# Patient Record
Sex: MALE | Race: WHITE | NOT HISPANIC OR LATINO
[De-identification: names, ages, dates, MRNs, and addresses within clinical notes are randomized per-mention and may not be internally consistent; named-entity substitution may affect disease eponyms.]

---

## 2013-12-19 RX ORDER — LISINOPRIL 2.5 MG/1
10.5 TABLET ORAL
Qty: 0 | Refills: 0 | DISCHARGE
Start: 2013-12-19

## 2017-01-04 ENCOUNTER — RESULT REVIEW (OUTPATIENT)
Age: 62
End: 2017-01-04

## 2017-01-04 ENCOUNTER — OUTPATIENT (OUTPATIENT)
Dept: OUTPATIENT SERVICES | Facility: HOSPITAL | Age: 62
LOS: 1 days | Discharge: HOME | End: 2017-01-04

## 2017-01-04 DIAGNOSIS — D72.819 DECREASED WHITE BLOOD CELL COUNT, UNSPECIFIED: ICD-10-CM

## 2017-01-04 DIAGNOSIS — Z98.89 OTHER SPECIFIED POSTPROCEDURAL STATES: Chronic | ICD-10-CM

## 2017-01-05 ENCOUNTER — APPOINTMENT (OUTPATIENT)
Dept: HEMATOLOGY ONCOLOGY | Facility: CLINIC | Age: 62
End: 2017-01-05

## 2017-01-05 VITALS
OXYGEN SATURATION: 97 % | WEIGHT: 151.9 LBS | SYSTOLIC BLOOD PRESSURE: 120 MMHG | RESPIRATION RATE: 16 BRPM | HEART RATE: 67 BPM | BODY MASS INDEX: 24.71 KG/M2 | DIASTOLIC BLOOD PRESSURE: 77 MMHG | HEIGHT: 65.94 IN | TEMPERATURE: 98 F

## 2017-01-05 LAB
HCT VFR BLD CALC: 44.7 % — SIGNIFICANT CHANGE UP (ref 39–50)
HGB BLD-MCNC: 15.2 G/DL — SIGNIFICANT CHANGE UP (ref 13–17)
MCHC RBC-ENTMCNC: 30.7 PG — SIGNIFICANT CHANGE UP (ref 27–34)
MCHC RBC-ENTMCNC: 34.1 GM/DL — SIGNIFICANT CHANGE UP (ref 32–36)
MCV RBC AUTO: 90.2 FL — SIGNIFICANT CHANGE UP (ref 80–100)
PLATELET # BLD AUTO: 115 K/UL — LOW (ref 150–400)
RBC # BLD: 4.95 M/UL — SIGNIFICANT CHANGE UP (ref 4.2–5.8)
RBC # FLD: 12.4 % — SIGNIFICANT CHANGE UP (ref 10.3–14.5)
RETICS #: 63.9 K/UL — SIGNIFICANT CHANGE UP (ref 25–125)
RETICS/RBC NFR: 1.2 %/6 HR — SIGNIFICANT CHANGE UP (ref 0.5–2.5)
WBC # BLD: 6.5 K/UL — SIGNIFICANT CHANGE UP (ref 3.8–10.5)
WBC # FLD AUTO: 6.5 K/UL — SIGNIFICANT CHANGE UP (ref 3.8–10.5)

## 2017-01-06 DIAGNOSIS — D69.6 THROMBOCYTOPENIA, UNSPECIFIED: ICD-10-CM

## 2017-01-06 LAB
BASOPHILS # BLD AUTO: 0 K/UL — SIGNIFICANT CHANGE UP (ref 0–0.2)
BASOPHILS NFR BLD AUTO: 0.6 % — SIGNIFICANT CHANGE UP (ref 0–2)
EOSINOPHIL # BLD AUTO: 0.1 K/UL — SIGNIFICANT CHANGE UP (ref 0–0.5)
EOSINOPHIL NFR BLD AUTO: 1.6 % — SIGNIFICANT CHANGE UP (ref 0–6)
HAPTOGLOB SERPL-MCNC: 72 MG/DL — SIGNIFICANT CHANGE UP (ref 34–200)
LYMPHOCYTES # BLD AUTO: 1.2 K/UL — SIGNIFICANT CHANGE UP (ref 1–3.3)
LYMPHOCYTES # BLD AUTO: 18.3 % — SIGNIFICANT CHANGE UP (ref 13–44)
MONOCYTES # BLD AUTO: 0.6 K/UL — SIGNIFICANT CHANGE UP (ref 0–0.9)
MONOCYTES NFR BLD AUTO: 8.8 % — SIGNIFICANT CHANGE UP (ref 2–14)
NEUTROPHILS # BLD AUTO: 4.6 K/UL — SIGNIFICANT CHANGE UP (ref 1.8–7.4)
NEUTROPHILS NFR BLD AUTO: 70.6 % — SIGNIFICANT CHANGE UP (ref 43–77)

## 2017-03-29 ENCOUNTER — APPOINTMENT (OUTPATIENT)
Dept: ELECTROPHYSIOLOGY | Facility: CLINIC | Age: 62
End: 2017-03-29

## 2017-03-29 ENCOUNTER — APPOINTMENT (OUTPATIENT)
Dept: CARDIOLOGY | Facility: HOSPITAL | Age: 62
End: 2017-03-29

## 2017-03-29 ENCOUNTER — NON-APPOINTMENT (OUTPATIENT)
Age: 62
End: 2017-03-29

## 2017-03-29 ENCOUNTER — OUTPATIENT (OUTPATIENT)
Dept: OUTPATIENT SERVICES | Facility: HOSPITAL | Age: 62
LOS: 1 days | End: 2017-03-29
Payer: MEDICAID

## 2017-03-29 VITALS
OXYGEN SATURATION: 97 % | HEIGHT: 65 IN | BODY MASS INDEX: 25.33 KG/M2 | SYSTOLIC BLOOD PRESSURE: 129 MMHG | HEART RATE: 74 BPM | DIASTOLIC BLOOD PRESSURE: 83 MMHG | WEIGHT: 152 LBS

## 2017-03-29 DIAGNOSIS — I25.10 ATHEROSCLEROTIC HEART DISEASE OF NATIVE CORONARY ARTERY WITHOUT ANGINA PECTORIS: ICD-10-CM

## 2017-03-29 DIAGNOSIS — Z98.89 OTHER SPECIFIED POSTPROCEDURAL STATES: Chronic | ICD-10-CM

## 2017-03-29 PROCEDURE — G0463: CPT

## 2017-03-30 DIAGNOSIS — I25.5 ISCHEMIC CARDIOMYOPATHY: ICD-10-CM

## 2017-04-04 ENCOUNTER — OUTPATIENT (OUTPATIENT)
Dept: OUTPATIENT SERVICES | Facility: HOSPITAL | Age: 62
LOS: 1 days | Discharge: HOME | End: 2017-04-04

## 2017-04-04 DIAGNOSIS — Z98.89 OTHER SPECIFIED POSTPROCEDURAL STATES: Chronic | ICD-10-CM

## 2017-04-04 DIAGNOSIS — D72.819 DECREASED WHITE BLOOD CELL COUNT, UNSPECIFIED: ICD-10-CM

## 2017-04-05 ENCOUNTER — RESULT REVIEW (OUTPATIENT)
Age: 62
End: 2017-04-05

## 2017-04-06 ENCOUNTER — APPOINTMENT (OUTPATIENT)
Dept: HEMATOLOGY ONCOLOGY | Facility: CLINIC | Age: 62
End: 2017-04-06

## 2017-04-06 VITALS
SYSTOLIC BLOOD PRESSURE: 128 MMHG | OXYGEN SATURATION: 98 % | HEART RATE: 77 BPM | WEIGHT: 151.02 LBS | RESPIRATION RATE: 16 BRPM | BODY MASS INDEX: 25.13 KG/M2 | DIASTOLIC BLOOD PRESSURE: 73 MMHG | TEMPERATURE: 98.4 F

## 2017-04-06 LAB
ALBUMIN SERPL ELPH-MCNC: 4.1 G/DL — SIGNIFICANT CHANGE UP (ref 3.3–5)
ALP SERPL-CCNC: 65 U/L — SIGNIFICANT CHANGE UP (ref 40–120)
ALT FLD-CCNC: 29 U/L — SIGNIFICANT CHANGE UP (ref 10–45)
ANION GAP SERPL CALC-SCNC: 13 MMOL/L — SIGNIFICANT CHANGE UP (ref 5–17)
AST SERPL-CCNC: 17 U/L — SIGNIFICANT CHANGE UP (ref 10–40)
BILIRUB SERPL-MCNC: 0.9 MG/DL — SIGNIFICANT CHANGE UP (ref 0.2–1.2)
BUN SERPL-MCNC: 25 MG/DL — HIGH (ref 7–23)
CALCIUM SERPL-MCNC: 8.8 MG/DL — SIGNIFICANT CHANGE UP (ref 8.4–10.5)
CHLORIDE SERPL-SCNC: 105 MMOL/L — SIGNIFICANT CHANGE UP (ref 96–108)
CO2 SERPL-SCNC: 23 MMOL/L — SIGNIFICANT CHANGE UP (ref 22–31)
CREAT SERPL-MCNC: 0.98 MG/DL — SIGNIFICANT CHANGE UP (ref 0.5–1.3)
GLUCOSE SERPL-MCNC: 87 MG/DL — SIGNIFICANT CHANGE UP (ref 70–99)
HCT VFR BLD CALC: 45.7 % — SIGNIFICANT CHANGE UP (ref 39–50)
HGB BLD-MCNC: 15.5 G/DL — SIGNIFICANT CHANGE UP (ref 13–17)
MCHC RBC-ENTMCNC: 30.7 PG — SIGNIFICANT CHANGE UP (ref 27–34)
MCHC RBC-ENTMCNC: 34 G/DL — SIGNIFICANT CHANGE UP (ref 32–36)
MCV RBC AUTO: 90.2 FL — SIGNIFICANT CHANGE UP (ref 80–100)
PLATELET # BLD AUTO: 115 K/UL — LOW (ref 150–400)
POTASSIUM SERPL-MCNC: 4.1 MMOL/L — SIGNIFICANT CHANGE UP (ref 3.5–5.3)
POTASSIUM SERPL-SCNC: 4.1 MMOL/L — SIGNIFICANT CHANGE UP (ref 3.5–5.3)
PROT SERPL-MCNC: 6.6 G/DL — SIGNIFICANT CHANGE UP (ref 6–8.3)
RBC # BLD: 5.06 M/UL — SIGNIFICANT CHANGE UP (ref 4.2–5.8)
RBC # FLD: 12.5 % — SIGNIFICANT CHANGE UP (ref 10.3–14.5)
SODIUM SERPL-SCNC: 141 MMOL/L — SIGNIFICANT CHANGE UP (ref 135–145)
WBC # BLD: 5.9 K/UL — SIGNIFICANT CHANGE UP (ref 3.8–10.5)
WBC # FLD AUTO: 5.9 K/UL — SIGNIFICANT CHANGE UP (ref 3.8–10.5)

## 2017-04-07 DIAGNOSIS — D69.6 THROMBOCYTOPENIA, UNSPECIFIED: ICD-10-CM

## 2017-04-07 LAB
BASOPHILS # BLD AUTO: 0 K/UL — SIGNIFICANT CHANGE UP (ref 0–0.2)
BASOPHILS NFR BLD AUTO: 0.6 % — SIGNIFICANT CHANGE UP (ref 0–2)
EOSINOPHIL # BLD AUTO: 0 K/UL — SIGNIFICANT CHANGE UP (ref 0–0.5)
EOSINOPHIL NFR BLD AUTO: 0.8 % — SIGNIFICANT CHANGE UP (ref 0–6)
LYMPHOCYTES # BLD AUTO: 1 K/UL — SIGNIFICANT CHANGE UP (ref 1–3.3)
LYMPHOCYTES # BLD AUTO: 17.7 % — SIGNIFICANT CHANGE UP (ref 13–44)
MONOCYTES # BLD AUTO: 0.5 K/UL — SIGNIFICANT CHANGE UP (ref 0–0.9)
MONOCYTES NFR BLD AUTO: 9 % — SIGNIFICANT CHANGE UP (ref 2–14)
NEUTROPHILS # BLD AUTO: 4.2 K/UL — SIGNIFICANT CHANGE UP (ref 1.8–7.4)
NEUTROPHILS NFR BLD AUTO: 71.9 % — SIGNIFICANT CHANGE UP (ref 43–77)
RETICS #: 58.1 K/UL — SIGNIFICANT CHANGE UP (ref 25–125)
RETICS/RBC NFR: 1.1 % — SIGNIFICANT CHANGE UP (ref 0.5–2.5)

## 2017-07-26 ENCOUNTER — APPOINTMENT (OUTPATIENT)
Dept: ELECTROPHYSIOLOGY | Facility: CLINIC | Age: 62
End: 2017-07-26

## 2017-07-26 ENCOUNTER — OUTPATIENT (OUTPATIENT)
Dept: OUTPATIENT SERVICES | Facility: HOSPITAL | Age: 62
LOS: 1 days | End: 2017-07-26
Payer: MEDICAID

## 2017-07-26 ENCOUNTER — APPOINTMENT (OUTPATIENT)
Dept: CARDIOLOGY | Facility: HOSPITAL | Age: 62
End: 2017-07-26

## 2017-07-26 ENCOUNTER — NON-APPOINTMENT (OUTPATIENT)
Age: 62
End: 2017-07-26

## 2017-07-26 VITALS — OXYGEN SATURATION: 99 % | HEART RATE: 78 BPM | DIASTOLIC BLOOD PRESSURE: 77 MMHG | SYSTOLIC BLOOD PRESSURE: 127 MMHG

## 2017-07-26 DIAGNOSIS — I25.10 ATHEROSCLEROTIC HEART DISEASE OF NATIVE CORONARY ARTERY WITHOUT ANGINA PECTORIS: ICD-10-CM

## 2017-07-26 DIAGNOSIS — Z98.89 OTHER SPECIFIED POSTPROCEDURAL STATES: Chronic | ICD-10-CM

## 2017-07-26 PROCEDURE — G0463: CPT

## 2017-07-28 DIAGNOSIS — I50.22 CHRONIC SYSTOLIC (CONGESTIVE) HEART FAILURE: ICD-10-CM

## 2017-09-06 ENCOUNTER — APPOINTMENT (OUTPATIENT)
Dept: CARDIOLOGY | Facility: HOSPITAL | Age: 62
End: 2017-09-06

## 2017-09-06 ENCOUNTER — APPOINTMENT (OUTPATIENT)
Dept: CV DIAGNOSITCS | Facility: HOSPITAL | Age: 62
End: 2017-09-06

## 2017-09-06 ENCOUNTER — OUTPATIENT (OUTPATIENT)
Dept: OUTPATIENT SERVICES | Facility: HOSPITAL | Age: 62
LOS: 1 days | End: 2017-09-06
Payer: MEDICAID

## 2017-09-06 DIAGNOSIS — I50.22 CHRONIC SYSTOLIC (CONGESTIVE) HEART FAILURE: ICD-10-CM

## 2017-09-06 DIAGNOSIS — Z98.89 OTHER SPECIFIED POSTPROCEDURAL STATES: Chronic | ICD-10-CM

## 2017-09-06 PROCEDURE — 93306 TTE W/DOPPLER COMPLETE: CPT | Mod: 26

## 2017-09-06 PROCEDURE — C8929: CPT

## 2017-09-25 ENCOUNTER — MEDICATION RENEWAL (OUTPATIENT)
Age: 62
End: 2017-09-25

## 2017-09-27 ENCOUNTER — EMERGENCY (EMERGENCY)
Facility: HOSPITAL | Age: 62
LOS: 1 days | Discharge: HOME | End: 2017-09-27
Attending: EMERGENCY MEDICINE | Admitting: EMERGENCY MEDICINE
Payer: MEDICAID

## 2017-09-27 VITALS
WEIGHT: 141.98 LBS | HEIGHT: 65 IN | SYSTOLIC BLOOD PRESSURE: 150 MMHG | TEMPERATURE: 98 F | DIASTOLIC BLOOD PRESSURE: 81 MMHG | OXYGEN SATURATION: 96 % | RESPIRATION RATE: 16 BRPM | HEART RATE: 77 BPM

## 2017-09-27 VITALS
RESPIRATION RATE: 16 BRPM | HEART RATE: 74 BPM | TEMPERATURE: 98 F | DIASTOLIC BLOOD PRESSURE: 76 MMHG | OXYGEN SATURATION: 97 % | SYSTOLIC BLOOD PRESSURE: 138 MMHG

## 2017-09-27 DIAGNOSIS — Z79.02 LONG TERM (CURRENT) USE OF ANTITHROMBOTICS/ANTIPLATELETS: ICD-10-CM

## 2017-09-27 DIAGNOSIS — R07.0 PAIN IN THROAT: ICD-10-CM

## 2017-09-27 DIAGNOSIS — Z87.891 PERSONAL HISTORY OF NICOTINE DEPENDENCE: ICD-10-CM

## 2017-09-27 DIAGNOSIS — Z95.5 PRESENCE OF CORONARY ANGIOPLASTY IMPLANT AND GRAFT: ICD-10-CM

## 2017-09-27 DIAGNOSIS — I10 ESSENTIAL (PRIMARY) HYPERTENSION: ICD-10-CM

## 2017-09-27 DIAGNOSIS — Z79.82 LONG TERM (CURRENT) USE OF ASPIRIN: ICD-10-CM

## 2017-09-27 DIAGNOSIS — J02.9 ACUTE PHARYNGITIS, UNSPECIFIED: ICD-10-CM

## 2017-09-27 DIAGNOSIS — I25.2 OLD MYOCARDIAL INFARCTION: ICD-10-CM

## 2017-09-27 DIAGNOSIS — Z98.89 OTHER SPECIFIED POSTPROCEDURAL STATES: Chronic | ICD-10-CM

## 2017-09-27 LAB — S PYO AG SPEC QL IA: NEGATIVE — SIGNIFICANT CHANGE UP

## 2017-09-27 PROCEDURE — 99282 EMERGENCY DEPT VISIT SF MDM: CPT

## 2017-09-27 PROCEDURE — 99283 EMERGENCY DEPT VISIT LOW MDM: CPT

## 2017-09-27 PROCEDURE — 87081 CULTURE SCREEN ONLY: CPT

## 2017-09-27 PROCEDURE — 87880 STREP A ASSAY W/OPTIC: CPT

## 2017-09-27 NOTE — ED PROVIDER NOTE - MEDICAL DECISION MAKING DETAILS
60y/o Male Former smoker h/o MI s/p stent, HTN with sore throat x 3days, Strep throat was performed and was negative, symptoms likely 2/2 URI. PT was advised to rest and drink a lot of fluids and f/u with his PMD within this week. Return back to ED if with new or worsening symptom

## 2017-09-27 NOTE — ED PROVIDER NOTE - OBJECTIVE STATEMENT
62y/o Male Former smoker h/o MI s/p stent, HTN with sore throat x 3days. Pt reports mild cough with yellow phlegm, took lozenges with some improved symptoms. Denies fever/chills/N/V/D/chest pain/ SOB/ recent sick contact.

## 2017-09-27 NOTE — ED PROVIDER NOTE - ATTENDING CONTRIBUTION TO CARE
62 yo male c/o URI symptoms x 3 days, no fever/chills/body aches.  No night sweats/weight loss.  No chest pain, no shortness of breath. Has been using [place] throat drops with relief. +erythematous pharynx, no vesicles, no exudates.  Rapid strep negative,  cultures sent.  Will f/u with PMD Dr. Kellie Bear.

## 2017-09-27 NOTE — ED ADULT NURSE NOTE - OBJECTIVE STATEMENT
Pt is C/O throat pain x 3 days with non-productive cough. Denies fever/chills. Reports Hx tonsillectomy as a child

## 2017-09-28 ENCOUNTER — EMERGENCY (EMERGENCY)
Facility: HOSPITAL | Age: 62
LOS: 1 days | Discharge: HOME | End: 2017-09-28
Attending: EMERGENCY MEDICINE | Admitting: EMERGENCY MEDICINE
Payer: MEDICAID

## 2017-09-28 VITALS
HEIGHT: 65 IN | SYSTOLIC BLOOD PRESSURE: 119 MMHG | OXYGEN SATURATION: 100 % | RESPIRATION RATE: 14 BRPM | WEIGHT: 141.98 LBS | DIASTOLIC BLOOD PRESSURE: 56 MMHG | HEART RATE: 88 BPM | TEMPERATURE: 97 F

## 2017-09-28 DIAGNOSIS — Z98.89 OTHER SPECIFIED POSTPROCEDURAL STATES: Chronic | ICD-10-CM

## 2017-09-28 DIAGNOSIS — R07.0 PAIN IN THROAT: ICD-10-CM

## 2017-09-28 DIAGNOSIS — I25.2 OLD MYOCARDIAL INFARCTION: ICD-10-CM

## 2017-09-28 DIAGNOSIS — Z79.02 LONG TERM (CURRENT) USE OF ANTITHROMBOTICS/ANTIPLATELETS: ICD-10-CM

## 2017-09-28 DIAGNOSIS — Z95.5 PRESENCE OF CORONARY ANGIOPLASTY IMPLANT AND GRAFT: ICD-10-CM

## 2017-09-28 DIAGNOSIS — Z79.82 LONG TERM (CURRENT) USE OF ASPIRIN: ICD-10-CM

## 2017-09-28 DIAGNOSIS — J02.8 ACUTE PHARYNGITIS DUE TO OTHER SPECIFIED ORGANISMS: ICD-10-CM

## 2017-09-28 PROCEDURE — 99283 EMERGENCY DEPT VISIT LOW MDM: CPT

## 2017-09-28 PROCEDURE — 99282 EMERGENCY DEPT VISIT SF MDM: CPT

## 2017-09-28 NOTE — ED PROVIDER NOTE - OBJECTIVE STATEMENT
pt seen here for sore throat yesterday, had negative strep test done.  pt returns asking for abx and treatment for pain.  pt has not been taking any pain medication.

## 2017-09-29 LAB
CULTURE RESULTS: SIGNIFICANT CHANGE UP
SPECIMEN SOURCE: SIGNIFICANT CHANGE UP

## 2017-10-19 ENCOUNTER — MEDICATION RENEWAL (OUTPATIENT)
Age: 62
End: 2017-10-19

## 2017-12-03 ENCOUNTER — EMERGENCY (EMERGENCY)
Facility: HOSPITAL | Age: 62
LOS: 1 days | End: 2017-12-03
Attending: EMERGENCY MEDICINE
Payer: MEDICAID

## 2017-12-03 VITALS
OXYGEN SATURATION: 98 % | HEART RATE: 85 BPM | DIASTOLIC BLOOD PRESSURE: 75 MMHG | SYSTOLIC BLOOD PRESSURE: 127 MMHG | TEMPERATURE: 98 F | RESPIRATION RATE: 15 BRPM

## 2017-12-03 VITALS
OXYGEN SATURATION: 98 % | RESPIRATION RATE: 14 BRPM | HEART RATE: 80 BPM | SYSTOLIC BLOOD PRESSURE: 120 MMHG | TEMPERATURE: 99 F | WEIGHT: 145.95 LBS | DIASTOLIC BLOOD PRESSURE: 80 MMHG

## 2017-12-03 DIAGNOSIS — Z98.89 OTHER SPECIFIED POSTPROCEDURAL STATES: Chronic | ICD-10-CM

## 2017-12-03 LAB
APPEARANCE UR: CLEAR — SIGNIFICANT CHANGE UP
BILIRUB UR-MCNC: NEGATIVE — SIGNIFICANT CHANGE UP
COLOR SPEC: YELLOW — SIGNIFICANT CHANGE UP
DIFF PNL FLD: ABNORMAL
GLUCOSE UR QL: NEGATIVE — SIGNIFICANT CHANGE UP
KETONES UR-MCNC: NEGATIVE — SIGNIFICANT CHANGE UP
LEUKOCYTE ESTERASE UR-ACNC: NEGATIVE — SIGNIFICANT CHANGE UP
NITRITE UR-MCNC: NEGATIVE — SIGNIFICANT CHANGE UP
PH UR: 5 — SIGNIFICANT CHANGE UP (ref 5–8)
PROT UR-MCNC: NEGATIVE — SIGNIFICANT CHANGE UP
SP GR SPEC: 1.02 — SIGNIFICANT CHANGE UP (ref 1.01–1.02)
UROBILINOGEN FLD QL: NEGATIVE — SIGNIFICANT CHANGE UP

## 2017-12-03 PROCEDURE — 99284 EMERGENCY DEPT VISIT MOD MDM: CPT | Mod: 25

## 2017-12-03 PROCEDURE — 99284 EMERGENCY DEPT VISIT MOD MDM: CPT

## 2017-12-03 PROCEDURE — 73502 X-RAY EXAM HIP UNI 2-3 VIEWS: CPT

## 2017-12-03 PROCEDURE — 76870 US EXAM SCROTUM: CPT

## 2017-12-03 PROCEDURE — 73502 X-RAY EXAM HIP UNI 2-3 VIEWS: CPT | Mod: 26,LT

## 2017-12-03 PROCEDURE — 76870 US EXAM SCROTUM: CPT | Mod: 26

## 2017-12-03 PROCEDURE — 81001 URINALYSIS AUTO W/SCOPE: CPT

## 2017-12-03 PROCEDURE — 87086 URINE CULTURE/COLONY COUNT: CPT

## 2017-12-03 PROCEDURE — 72100 X-RAY EXAM L-S SPINE 2/3 VWS: CPT

## 2017-12-03 PROCEDURE — 72100 X-RAY EXAM L-S SPINE 2/3 VWS: CPT | Mod: 26

## 2017-12-03 RX ORDER — NITROFURANTOIN MACROCRYSTAL 50 MG
1 CAPSULE ORAL
Qty: 20 | Refills: 0
Start: 2017-12-03 | End: 2017-12-13

## 2017-12-03 NOTE — ED PROVIDER NOTE - PLAN OF CARE
Return to the ED for any new or worsening symptoms  Take your medication as prescribed  Naproxen per label instructions as needed for pain   Scrotal support as instructed   Macrobid 1 tab 2 times a day   Follow up with urology within the week for a recheck   Advance activity as tolerated

## 2017-12-03 NOTE — ED PROVIDER NOTE - OBJECTIVE STATEMENT
Pt is a 63 yo male who presents to the ED with a cc of left groin pain.  PMHx of HTN, HLD, reflux, CAD s/p STEMI in 2013 with BMS to LAD.  ECHO from 2017: severe segmental LV dysfunction.  The mid to apical inferoseptal and anteroseptal walls are akinetic, thinned, mildly aneurysmal.  The apical inferior, anterior and lateral walls are hypokinetic.  Grade I diastolic dysfunction.  EF 25-30%.  Pt s/p PPM/defib.  Reports that for the last week he has been experiencing left testicular pain only when bending forward.  Denies pain at rest.  Denies pain during ambulation.  Denies fever, chills, N/V/D/C, CP, SOB, abd pain.  Pt denies dysuria, frequency, urgency.  He has had no discharge from his penis.  Denies similar pain.  He cannot recall any injury, has not lifted anything heavy recently.

## 2017-12-03 NOTE — ED PROVIDER NOTE - INGUINAL REGION
no hernia noted, swelling to right chronic non tender chronic per pt. mildly enlarged left inguinal lymph node no testicular tenderness on exam, no discharge from penile head/no tenderness

## 2017-12-03 NOTE — ED ADULT NURSE NOTE - OBJECTIVE STATEMENT
Pt presents to the ED with possible hernia located to the Left more medical aspect of pelvis, reports first noticing it 1 week ago, denies any pain currently, denies any issues with bowel movements and voids freely, denies fevers, n/v/d, breathing even and unlabored, bowel sounds present, abd soft non tender, labs drawn and sent, advised patient on plan of care, verbalized understanding, awaiting dispo

## 2017-12-03 NOTE — ED PROVIDER NOTE - PROGRESS NOTE DETAILS
Results of labs and images reviewed.  Hematuria noted, along with results of x-ray and testicular US.  Will treat for possible hemorrhagic cystitis.  Pt instructed to wear supportive garments and to finish Macrobid course.  Will need to follow up with urology for urine recheck this week to ensure hematuria resolved. All questions answered, copy of results provided stable for discharge home at this time with outpatient follow up

## 2017-12-03 NOTE — ED PROVIDER NOTE - CARE PLAN
Principal Discharge DX:	Hematuria  Instructions for follow-up, activity and diet:	Return to the ED for any new or worsening symptoms  Take your medication as prescribed  Naproxen per label instructions as needed for pain   Scrotal support as instructed   Macrobid 1 tab 2 times a day   Follow up with urology within the week for a recheck   Advance activity as tolerated  Secondary Diagnosis:	Varicocele  Secondary Diagnosis:	Testicular pain, left

## 2017-12-04 LAB
CULTURE RESULTS: SIGNIFICANT CHANGE UP
SPECIMEN SOURCE: SIGNIFICANT CHANGE UP

## 2017-12-28 ENCOUNTER — MEDICATION RENEWAL (OUTPATIENT)
Age: 62
End: 2017-12-28

## 2018-01-03 ENCOUNTER — EMERGENCY (EMERGENCY)
Facility: HOSPITAL | Age: 63
LOS: 1 days | Discharge: HOME | End: 2018-01-03
Attending: EMERGENCY MEDICINE | Admitting: EMERGENCY MEDICINE
Payer: MEDICAID

## 2018-01-03 VITALS
DIASTOLIC BLOOD PRESSURE: 73 MMHG | TEMPERATURE: 99 F | WEIGHT: 145.95 LBS | RESPIRATION RATE: 16 BRPM | HEART RATE: 85 BPM | SYSTOLIC BLOOD PRESSURE: 125 MMHG | OXYGEN SATURATION: 98 % | HEIGHT: 65 IN

## 2018-01-03 DIAGNOSIS — Z98.89 OTHER SPECIFIED POSTPROCEDURAL STATES: Chronic | ICD-10-CM

## 2018-01-03 PROCEDURE — 71046 X-RAY EXAM CHEST 2 VIEWS: CPT | Mod: 26

## 2018-01-03 PROCEDURE — 99283 EMERGENCY DEPT VISIT LOW MDM: CPT | Mod: 25

## 2018-01-03 PROCEDURE — 99283 EMERGENCY DEPT VISIT LOW MDM: CPT

## 2018-01-03 PROCEDURE — 71046 X-RAY EXAM CHEST 2 VIEWS: CPT

## 2018-01-03 RX ORDER — ACETAMINOPHEN 500 MG
650 TABLET ORAL ONCE
Qty: 0 | Refills: 0 | Status: COMPLETED | OUTPATIENT
Start: 2018-01-03 | End: 2018-01-03

## 2018-01-03 RX ADMIN — Medication 200 MILLIGRAM(S): at 10:12

## 2018-01-03 RX ADMIN — Medication 650 MILLIGRAM(S): at 10:12

## 2018-01-03 NOTE — ED ADULT NURSE NOTE - OBJECTIVE STATEMENT
Pt received in bed alert and oriented and resting in bed. Pt has c/o congestion and cough x 3 days as per Md's orders meds given and tolerated well. Pt stable and d/c home with RX

## 2018-01-03 NOTE — ED ADULT NURSE NOTE - CHPI ED SYMPTOMS NEG
no fever/no chest pain/no headache/no hemoptysis/no diaphoresis/no chills/no wheezing/no edema/no shortness of breath/no body aches

## 2018-01-03 NOTE — ED PROVIDER NOTE - OBJECTIVE STATEMENT
pt with sporadic episodes of coughing over the past 2 days, states that his apartment is too cold and has to sleep with his jacket on.  denies fevers or chills, h/o MI 4 years ago w 1 stent.  c/o sharp stabbing chest pain only when he coughs. no SOB, no sputum production.

## 2018-01-10 ENCOUNTER — RX RENEWAL (OUTPATIENT)
Age: 63
End: 2018-01-10

## 2018-01-29 ENCOUNTER — MEDICATION RENEWAL (OUTPATIENT)
Age: 63
End: 2018-01-29

## 2018-01-31 ENCOUNTER — APPOINTMENT (OUTPATIENT)
Dept: CARDIOLOGY | Facility: HOSPITAL | Age: 63
End: 2018-01-31

## 2018-02-21 ENCOUNTER — APPOINTMENT (OUTPATIENT)
Dept: CARDIOLOGY | Facility: HOSPITAL | Age: 63
End: 2018-02-21

## 2018-02-21 ENCOUNTER — APPOINTMENT (OUTPATIENT)
Dept: ELECTROPHYSIOLOGY | Facility: CLINIC | Age: 63
End: 2018-02-21
Payer: MEDICAID

## 2018-02-21 ENCOUNTER — OUTPATIENT (OUTPATIENT)
Dept: OUTPATIENT SERVICES | Facility: HOSPITAL | Age: 63
LOS: 1 days | End: 2018-02-21
Payer: MEDICAID

## 2018-02-21 VITALS
SYSTOLIC BLOOD PRESSURE: 128 MMHG | HEART RATE: 85 BPM | DIASTOLIC BLOOD PRESSURE: 73 MMHG | WEIGHT: 142 LBS | OXYGEN SATURATION: 99 % | BODY MASS INDEX: 23.66 KG/M2 | HEIGHT: 65 IN

## 2018-02-21 DIAGNOSIS — Z98.89 OTHER SPECIFIED POSTPROCEDURAL STATES: Chronic | ICD-10-CM

## 2018-02-21 DIAGNOSIS — I25.10 ATHEROSCLEROTIC HEART DISEASE OF NATIVE CORONARY ARTERY WITHOUT ANGINA PECTORIS: ICD-10-CM

## 2018-02-21 PROCEDURE — 93282 PRGRMG EVAL IMPLANTABLE DFB: CPT

## 2018-02-21 PROCEDURE — G0463: CPT

## 2018-02-22 DIAGNOSIS — I50.20 UNSPECIFIED SYSTOLIC (CONGESTIVE) HEART FAILURE: ICD-10-CM

## 2018-02-28 ENCOUNTER — MEDICATION RENEWAL (OUTPATIENT)
Age: 63
End: 2018-02-28

## 2018-03-12 ENCOUNTER — MEDICATION RENEWAL (OUTPATIENT)
Age: 63
End: 2018-03-12

## 2018-03-20 ENCOUNTER — MEDICATION RENEWAL (OUTPATIENT)
Age: 63
End: 2018-03-20

## 2018-03-21 ENCOUNTER — APPOINTMENT (OUTPATIENT)
Dept: CARDIOLOGY | Facility: HOSPITAL | Age: 63
End: 2018-03-21

## 2018-03-29 ENCOUNTER — MEDICATION RENEWAL (OUTPATIENT)
Age: 63
End: 2018-03-29

## 2018-04-04 ENCOUNTER — CLINICAL ADVICE (OUTPATIENT)
Age: 63
End: 2018-04-04

## 2018-04-04 ENCOUNTER — APPOINTMENT (OUTPATIENT)
Dept: CARDIOLOGY | Facility: HOSPITAL | Age: 63
End: 2018-04-04

## 2018-04-23 ENCOUNTER — RX RENEWAL (OUTPATIENT)
Age: 63
End: 2018-04-23

## 2018-04-26 ENCOUNTER — RX RENEWAL (OUTPATIENT)
Age: 63
End: 2018-04-26

## 2018-05-02 ENCOUNTER — OUTPATIENT (OUTPATIENT)
Dept: OUTPATIENT SERVICES | Facility: HOSPITAL | Age: 63
LOS: 1 days | End: 2018-05-02
Payer: MEDICAID

## 2018-05-02 ENCOUNTER — APPOINTMENT (OUTPATIENT)
Dept: CARDIOLOGY | Facility: HOSPITAL | Age: 63
End: 2018-05-02

## 2018-05-02 VITALS
DIASTOLIC BLOOD PRESSURE: 71 MMHG | OXYGEN SATURATION: 98 % | SYSTOLIC BLOOD PRESSURE: 119 MMHG | WEIGHT: 142 LBS | HEART RATE: 73 BPM | HEIGHT: 65 IN | BODY MASS INDEX: 23.66 KG/M2

## 2018-05-02 DIAGNOSIS — I25.10 ATHEROSCLEROTIC HEART DISEASE OF NATIVE CORONARY ARTERY WITHOUT ANGINA PECTORIS: ICD-10-CM

## 2018-05-02 DIAGNOSIS — Z98.89 OTHER SPECIFIED POSTPROCEDURAL STATES: Chronic | ICD-10-CM

## 2018-05-02 PROCEDURE — G0463: CPT

## 2018-05-04 DIAGNOSIS — I50.20 UNSPECIFIED SYSTOLIC (CONGESTIVE) HEART FAILURE: ICD-10-CM

## 2018-05-15 ENCOUNTER — MEDICATION RENEWAL (OUTPATIENT)
Age: 63
End: 2018-05-15

## 2018-05-16 ENCOUNTER — APPOINTMENT (OUTPATIENT)
Dept: CARDIOLOGY | Facility: HOSPITAL | Age: 63
End: 2018-05-16

## 2018-05-16 ENCOUNTER — OUTPATIENT (OUTPATIENT)
Dept: OUTPATIENT SERVICES | Facility: HOSPITAL | Age: 63
LOS: 1 days | End: 2018-05-16
Payer: MEDICAID

## 2018-05-16 VITALS
SYSTOLIC BLOOD PRESSURE: 116 MMHG | DIASTOLIC BLOOD PRESSURE: 72 MMHG | OXYGEN SATURATION: 98 % | HEIGHT: 65 IN | HEART RATE: 72 BPM | WEIGHT: 142 LBS | BODY MASS INDEX: 23.66 KG/M2

## 2018-05-16 DIAGNOSIS — I25.10 ATHEROSCLEROTIC HEART DISEASE OF NATIVE CORONARY ARTERY WITHOUT ANGINA PECTORIS: ICD-10-CM

## 2018-05-16 DIAGNOSIS — Z98.89 OTHER SPECIFIED POSTPROCEDURAL STATES: Chronic | ICD-10-CM

## 2018-05-16 PROCEDURE — G0463: CPT

## 2018-05-17 DIAGNOSIS — I50.20 UNSPECIFIED SYSTOLIC (CONGESTIVE) HEART FAILURE: ICD-10-CM

## 2018-08-22 ENCOUNTER — OUTPATIENT (OUTPATIENT)
Dept: OUTPATIENT SERVICES | Facility: HOSPITAL | Age: 63
LOS: 1 days | End: 2018-08-22
Payer: MEDICAID

## 2018-08-22 ENCOUNTER — APPOINTMENT (OUTPATIENT)
Dept: CARDIOLOGY | Facility: HOSPITAL | Age: 63
End: 2018-08-22

## 2018-08-22 ENCOUNTER — APPOINTMENT (OUTPATIENT)
Dept: ELECTROPHYSIOLOGY | Facility: CLINIC | Age: 63
End: 2018-08-22
Payer: MEDICAID

## 2018-08-22 VITALS — SYSTOLIC BLOOD PRESSURE: 113 MMHG | DIASTOLIC BLOOD PRESSURE: 67 MMHG | OXYGEN SATURATION: 97 % | HEART RATE: 64 BPM

## 2018-08-22 DIAGNOSIS — Z98.89 OTHER SPECIFIED POSTPROCEDURAL STATES: Chronic | ICD-10-CM

## 2018-08-22 DIAGNOSIS — I25.10 ATHEROSCLEROTIC HEART DISEASE OF NATIVE CORONARY ARTERY WITHOUT ANGINA PECTORIS: ICD-10-CM

## 2018-08-22 PROCEDURE — G0463: CPT

## 2018-08-22 PROCEDURE — 93282 PRGRMG EVAL IMPLANTABLE DFB: CPT

## 2018-08-23 DIAGNOSIS — I50.20 UNSPECIFIED SYSTOLIC (CONGESTIVE) HEART FAILURE: ICD-10-CM

## 2018-10-17 ENCOUNTER — RX RENEWAL (OUTPATIENT)
Age: 63
End: 2018-10-17

## 2018-12-05 ENCOUNTER — APPOINTMENT (OUTPATIENT)
Dept: CARDIOLOGY | Facility: HOSPITAL | Age: 63
End: 2018-12-05

## 2018-12-05 ENCOUNTER — APPOINTMENT (OUTPATIENT)
Dept: ELECTROPHYSIOLOGY | Facility: CLINIC | Age: 63
End: 2018-12-05
Payer: MEDICAID

## 2018-12-05 ENCOUNTER — OUTPATIENT (OUTPATIENT)
Dept: OUTPATIENT SERVICES | Facility: HOSPITAL | Age: 63
LOS: 1 days | End: 2018-12-05
Payer: MEDICAID

## 2018-12-05 VITALS
WEIGHT: 135 LBS | HEART RATE: 70 BPM | DIASTOLIC BLOOD PRESSURE: 72 MMHG | HEIGHT: 65 IN | OXYGEN SATURATION: 98 % | SYSTOLIC BLOOD PRESSURE: 137 MMHG | BODY MASS INDEX: 22.49 KG/M2

## 2018-12-05 VITALS — DIASTOLIC BLOOD PRESSURE: 78 MMHG | SYSTOLIC BLOOD PRESSURE: 127 MMHG | HEART RATE: 70 BPM

## 2018-12-05 DIAGNOSIS — I25.10 ATHEROSCLEROTIC HEART DISEASE OF NATIVE CORONARY ARTERY WITHOUT ANGINA PECTORIS: ICD-10-CM

## 2018-12-05 DIAGNOSIS — Z98.89 OTHER SPECIFIED POSTPROCEDURAL STATES: Chronic | ICD-10-CM

## 2018-12-05 PROCEDURE — 93282 PRGRMG EVAL IMPLANTABLE DFB: CPT

## 2018-12-05 PROCEDURE — G0463: CPT

## 2018-12-06 DIAGNOSIS — I50.20 UNSPECIFIED SYSTOLIC (CONGESTIVE) HEART FAILURE: ICD-10-CM

## 2019-01-11 NOTE — HISTORY OF PRESENT ILLNESS
[FreeTextEntry1] : Felix is a 62 y/o man with a history of STEMI 12/2013 s/p BMS to proximal LAD, and ICM HFrEF 30-35% s/p ICD for primary prevention is here for follow up. Mr. Farr had an echo performed in 9/2017 which continued to show severe segmental LV dysfunction as well as an aneurysmal apex. 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 He continues to tolerate the increased dose of Toprol and lisinopril without issue, but remains nervous and hesitant about increasing his medications. Recently he lost his job working at a [employer] and is currently hopeful of getting a similar job.

## 2019-01-11 NOTE — REVIEW OF SYSTEMS
[Negative] : Neurological [Shortness Of Breath] : no shortness of breath [Dyspnea on exertion] : not dyspnea during exertion [Chest  Pressure] : no chest pressure [Chest Pain] : no chest pain [Lower Ext Edema] : no extremity edema [Palpitations] : no palpitations [Cough] : no cough [Nausea] : no nausea [Vomiting] : no vomiting [Easy Bleeding] : no tendency for easy bleeding [Easy Bruising] : no tendency for easy bruising

## 2019-01-11 NOTE — DISCUSSION/SUMMARY
[FreeTextEntry1] : Mr. Farr is a 61 yo man with a history of STEMI 2013 s/p BMS to LAD, decreased EF 30-35% s/p ICD 3/2014 for primary prevention.\par \par 1. CAD/STEMI \par - Cont with Aspirin and Plavix, he's interested on remaining on Plavix even this far out from his MI, will c/w DAPT; no issues w/ bleeding at current\par - Taking ACEi at night time and tolerating well, will increase to 10mg and c/w Toprol XL 50mg qDay, continue to up titrate and reassure him given his reluctance stemming from prior symptomatic episodes\par \par 2. ICM HFrEF 25-30% by TTE s/p single lead Medtronic Evera ICD placement for primary prevention\par - Euvolemic on exam, no need for diuretics at current\par - ACEi and BB as above, prior notes state that he had been unable to tolerate higher dosages as developed dyspnea with higher lisinopril dose and fatigue with higher Toprol dose; continue to reassure and slowly up titrate medications\par  \par 3. HLD\par - From outside labs (18) TC: 89, HDL: 45, LDL (calculated): 29, T\par \par

## 2019-01-11 NOTE — PHYSICAL EXAM
[General Appearance - Well Developed] : well developed [Normal Appearance] : normal appearance [General Appearance - In No Acute Distress] : no acute distress [No Oral Pallor] : no oral pallor [No Oral Cyanosis] : no oral cyanosis [Exaggerated Use Of Accessory Muscles For Inspiration] : no accessory muscle use [Auscultation Breath Sounds / Voice Sounds] : lungs were clear to auscultation bilaterally [Heart Rate And Rhythm] : heart rate and rhythm were normal [Heart Sounds] : normal S1 and S2 [Murmurs] : no murmurs present [Arterial Pulses Normal] : the arterial pulses were normal [Edema] : no peripheral edema present [Abdomen Soft] : soft [Abdomen Tenderness] : non-tender [Abnormal Walk] : normal gait [Nail Clubbing] : no clubbing of the fingernails [Cyanosis, Localized] : no localized cyanosis [Skin Color & Pigmentation] : normal skin color and pigmentation [] : no rash [Oriented To Time, Place, And Person] : oriented to person, place, and time [Affect] : the affect was normal [FreeTextEntry1] : JVP <  8cm

## 2019-02-13 ENCOUNTER — NON-APPOINTMENT (OUTPATIENT)
Age: 64
End: 2019-02-13

## 2019-02-13 ENCOUNTER — OUTPATIENT (OUTPATIENT)
Dept: OUTPATIENT SERVICES | Facility: HOSPITAL | Age: 64
LOS: 1 days | End: 2019-02-13
Payer: MEDICAID

## 2019-02-13 ENCOUNTER — APPOINTMENT (OUTPATIENT)
Dept: CARDIOLOGY | Facility: HOSPITAL | Age: 64
End: 2019-02-13

## 2019-02-13 VITALS
SYSTOLIC BLOOD PRESSURE: 132 MMHG | OXYGEN SATURATION: 98 % | HEART RATE: 66 BPM | DIASTOLIC BLOOD PRESSURE: 80 MMHG | WEIGHT: 153 LBS | HEIGHT: 65 IN | BODY MASS INDEX: 25.49 KG/M2

## 2019-02-13 DIAGNOSIS — H93.11 TINNITUS, RIGHT EAR: ICD-10-CM

## 2019-02-13 DIAGNOSIS — I25.10 ATHEROSCLEROTIC HEART DISEASE OF NATIVE CORONARY ARTERY WITHOUT ANGINA PECTORIS: ICD-10-CM

## 2019-02-13 DIAGNOSIS — Z98.89 OTHER SPECIFIED POSTPROCEDURAL STATES: Chronic | ICD-10-CM

## 2019-02-13 PROCEDURE — G0463: CPT

## 2019-02-13 NOTE — REASON FOR VISIT
[Follow-Up - Clinic] : a clinic follow-up of [Coronary Artery Disease] : coronary artery disease [Heart Failure] : congestive heart failure [Prior Myocardial Infarction] : a prior myocardial infarction

## 2019-02-14 NOTE — DISCUSSION/SUMMARY
[FreeTextEntry1] : Mr. Farr is a 63 yo man with a history of STEMI 2013 s/p BMS to LAD, decreased EF 30-35% s/p ICD 3/2014 for primary prevention.\par \par 1. CAD/STEMI \par - Cont with Aspirin and Plavix, he's interested on remaining on Plavix even this far out from his MI, will c/w DAPT; no issues w/ bleeding at current\par - Taking ACEi at night time and tolerating well, c/w Lisinopril 10mg and increase to Toprol XL 75mg qDay (prescribed 50mg and 25mg tablets)\par \par 2. ICM HFrEF 25-30% by TTE s/p single lead Medtronic Evera ICD placement for primary prevention\par - Euvolemic on exam, no need for diuretics at current\par - ACEi and BB as above, prior notes state that he had been unable to tolerate higher dosages as developed dyspnea with higher lisinopril dose and fatigue with higher Toprol dose; continue to reassure and slowly up titrate medications\par - Discussed keeping the walkie-talkie not too close to his ICD site and to continue regular check ups\par  \par 3. HLD\par - From outside labs (18) TC: 89, HDL: 45, LDL (calculated): 29, T\par \par

## 2019-02-14 NOTE — PHYSICAL EXAM
[General Appearance - Well Developed] : well developed [Normal Appearance] : normal appearance [General Appearance - In No Acute Distress] : no acute distress [No Oral Pallor] : no oral pallor [No Oral Cyanosis] : no oral cyanosis [] : no respiratory distress [Exaggerated Use Of Accessory Muscles For Inspiration] : no accessory muscle use [Auscultation Breath Sounds / Voice Sounds] : lungs were clear to auscultation bilaterally [Heart Rate And Rhythm] : heart rate and rhythm were normal [Heart Sounds] : normal S1 and S2 [Murmurs] : no murmurs present [Arterial Pulses Normal] : the arterial pulses were normal [Edema] : no peripheral edema present [Abdomen Soft] : soft [Abdomen Tenderness] : non-tender [Abnormal Walk] : normal gait [Nail Clubbing] : no clubbing of the fingernails [Cyanosis, Localized] : no localized cyanosis [Skin Color & Pigmentation] : normal skin color and pigmentation [Oriented To Time, Place, And Person] : oriented to person, place, and time [Affect] : the affect was normal [FreeTextEntry1] : Mild erythematous rash on central chest

## 2019-02-14 NOTE — HISTORY OF PRESENT ILLNESS
[FreeTextEntry1] : Felix is a 64 y/o man with a history of STEMI 12/2013 s/p BMS to proximal LAD, and ICM HFrEF 30-35% s/p ICD for primary prevention is here for follow up. Mr. Farr had an echo performed in 9/2017 which continued to show severe segmental LV dysfunction as well as an aneurysmal apex. \par \par He continues to do well. He continues to tolerate the increased dose of Toprol and lisinopril without issue, but remains nervous and hesitant about increasing his medications. 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 Reports using a walkie-talkie at work and is concerned that it could interfere w/ his ICD. No recent shocks or palpitations.

## 2019-02-20 DIAGNOSIS — I50.20 UNSPECIFIED SYSTOLIC (CONGESTIVE) HEART FAILURE: ICD-10-CM

## 2019-04-03 ENCOUNTER — APPOINTMENT (OUTPATIENT)
Dept: ELECTROPHYSIOLOGY | Facility: CLINIC | Age: 64
End: 2019-04-03

## 2019-04-05 ENCOUNTER — MEDICATION RENEWAL (OUTPATIENT)
Age: 64
End: 2019-04-05

## 2019-04-11 ENCOUNTER — MEDICATION RENEWAL (OUTPATIENT)
Age: 64
End: 2019-04-11

## 2019-04-15 NOTE — ED ADULT NURSE NOTE - OBJECTIVE STATEMENT
[FreeTextEntry1] : \par \par Plan.\par 1.  Lab tests\par 2.  Ophthalmology consult\par 3.  Reduce prednisone 7.5 mg daily\par 4.  Consider increasing CellCept\par 5.  PNVX-13 valent given\par 6.  Return 4-6 weeks pt states he was seen here yesterday for sore throat which continues today. woke up at 1 am with bad pains in throat , did not take anything, did the salt water gargle.

## 2019-05-01 ENCOUNTER — OUTPATIENT (OUTPATIENT)
Dept: OUTPATIENT SERVICES | Facility: HOSPITAL | Age: 64
LOS: 1 days | End: 2019-05-01
Payer: MEDICAID

## 2019-05-01 DIAGNOSIS — Z98.89 OTHER SPECIFIED POSTPROCEDURAL STATES: Chronic | ICD-10-CM

## 2019-05-01 PROCEDURE — G9001: CPT

## 2019-05-15 ENCOUNTER — OUTPATIENT (OUTPATIENT)
Dept: OUTPATIENT SERVICES | Facility: HOSPITAL | Age: 64
LOS: 1 days | End: 2019-05-15
Payer: MEDICAID

## 2019-05-15 ENCOUNTER — APPOINTMENT (OUTPATIENT)
Dept: ELECTROPHYSIOLOGY | Facility: CLINIC | Age: 64
End: 2019-05-15
Payer: MEDICAID

## 2019-05-15 ENCOUNTER — APPOINTMENT (OUTPATIENT)
Dept: CARDIOLOGY | Facility: HOSPITAL | Age: 64
End: 2019-05-15

## 2019-05-15 VITALS
SYSTOLIC BLOOD PRESSURE: 154 MMHG | OXYGEN SATURATION: 99 % | WEIGHT: 153 LBS | BODY MASS INDEX: 25.49 KG/M2 | HEART RATE: 82 BPM | HEIGHT: 65 IN | DIASTOLIC BLOOD PRESSURE: 87 MMHG

## 2019-05-15 DIAGNOSIS — Z98.89 OTHER SPECIFIED POSTPROCEDURAL STATES: Chronic | ICD-10-CM

## 2019-05-15 DIAGNOSIS — I25.10 ATHEROSCLEROTIC HEART DISEASE OF NATIVE CORONARY ARTERY WITHOUT ANGINA PECTORIS: ICD-10-CM

## 2019-05-15 PROCEDURE — 93282 PRGRMG EVAL IMPLANTABLE DFB: CPT

## 2019-05-15 PROCEDURE — 93005 ELECTROCARDIOGRAM TRACING: CPT

## 2019-05-15 PROCEDURE — G0463: CPT

## 2019-05-15 NOTE — DISCUSSION/SUMMARY
[FreeTextEntry1] : Mr. Farr is a 62 yo man with a history of STEMI 05/2013 s/p BMS to LAD, decreased EF 30-35% s/p ICD 3/2014 for primary prevention. Had previously not tolerated increasing GDMT due to lightheadedness and fatigue, but now tolerating increasing doses well.\par \par 1. CAD/STEMI \par - Cont with Aspirin indefinitely, he's interested on remaining on Plavix even this far out from his MI, will c/w DAPT; no issues w/ bleeding at current\par - Has to go for colonoscopy, will hold Plavix prior to procedure and c/w ASA and discuss if he needs to resume Plavix given that he's this far out from his MI\par - Taking ACEi at night time and tolerating well, c/w Lisinopril 10mg and still taking Toprol 50 mg due to his concern over possible side effects, increase to Toprol XL 75mg qDay (prescribed 50mg and 25mg tablets)\par \par 2. ICM HFrEF 25-30% LVIDd 5.3cm (TTE 9/17) s/p single lead Medtronic Evera ICD placement for primary prevention\par - Euvolemic on exam, no need for diuretics at current\par - ACEi and BB as above, prior notes state that he had been unable to tolerate higher dosages as developed dyspnea with higher lisinopril dose and fatigue with higher Toprol dose; continue to reassure and slowly up titrate medications\par - Previously he had been worried about the walkie-talkie that he uses at work interfering with his ICD, discussed keeping the walkie-talkie not too close to his ICD site and to continue regular check ups\par  \par 3. HLD\par - Most recent LDL of 39 (outside labs 4/19) at goal\par \par 4. Pre-operative risk stratification\par - Optimized at current, euvolemic on exam, hold Plavix as above, c/w ASA. EKG with evidence of old anteroseptal and inferior MI and unchanged from multiple priors. No further cardiovascular evaluation indicated at current, may proceed to EGD/colonoscopy.\par \par

## 2019-05-15 NOTE — REASON FOR VISIT
[Coronary Artery Disease] : coronary artery disease [Heart Failure] : congestive heart failure [Follow-Up - Clinic] : a clinic follow-up of [Prior Myocardial Infarction] : a prior myocardial infarction

## 2019-05-15 NOTE — PHYSICAL EXAM
[Normal Appearance] : normal appearance [General Appearance - Well Developed] : well developed [No Oral Pallor] : no oral pallor [General Appearance - In No Acute Distress] : no acute distress [No Oral Cyanosis] : no oral cyanosis [] : no respiratory distress [Exaggerated Use Of Accessory Muscles For Inspiration] : no accessory muscle use [Auscultation Breath Sounds / Voice Sounds] : lungs were clear to auscultation bilaterally [Heart Rate And Rhythm] : heart rate and rhythm were normal [Heart Sounds] : normal S1 and S2 [Edema] : no peripheral edema present [Arterial Pulses Normal] : the arterial pulses were normal [Murmurs] : no murmurs present [Abdomen Tenderness] : non-tender [Abdomen Soft] : soft [Nail Clubbing] : no clubbing of the fingernails [Cyanosis, Localized] : no localized cyanosis [Abnormal Walk] : normal gait [Skin Color & Pigmentation] : normal skin color and pigmentation [Affect] : the affect was normal [Oriented To Time, Place, And Person] : oriented to person, place, and time [FreeTextEntry1] : Mild erythematous rash on central chest

## 2019-05-15 NOTE — HISTORY OF PRESENT ILLNESS
[FreeTextEntry1] : Felix is a 62 y/o man with a history of STEMI 12/2013 s/p BMS to proximal LAD, and ICM HFrEF 30-35% s/p ICD for primary prevention is here for follow up. Mr. Farr had an echo performed in 9/2017 which continued to show severe segmental LV dysfunction as well as an aneurysmal apex. \par \par He continues to do well, but remains nervous about increasing his medications. 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 No CP, SOLORZANO, LE edema, PND/orthopnea. No recent shocks or palpitations. Reports that he needs a colonoscopy performed to better evaluate hemorrhoids.

## 2019-05-15 NOTE — REVIEW OF SYSTEMS
[Negative] : Neurological [Shortness Of Breath] : no shortness of breath [Dyspnea on exertion] : not dyspnea during exertion [Lower Ext Edema] : no extremity edema [Chest  Pressure] : no chest pressure [Chest Pain] : no chest pain [Palpitations] : no palpitations [Cough] : no cough [Nausea] : no nausea [Vomiting] : no vomiting [Easy Bleeding] : no tendency for easy bleeding [Easy Bruising] : no tendency for easy bruising

## 2019-05-16 DIAGNOSIS — Z71.89 OTHER SPECIFIED COUNSELING: ICD-10-CM

## 2019-05-16 DIAGNOSIS — I50.20 UNSPECIFIED SYSTOLIC (CONGESTIVE) HEART FAILURE: ICD-10-CM

## 2019-05-16 DIAGNOSIS — E78.5 HYPERLIPIDEMIA, UNSPECIFIED: ICD-10-CM

## 2019-09-13 ENCOUNTER — MEDICATION RENEWAL (OUTPATIENT)
Age: 64
End: 2019-09-13

## 2019-10-09 ENCOUNTER — APPOINTMENT (OUTPATIENT)
Dept: CARDIOLOGY | Facility: HOSPITAL | Age: 64
End: 2019-10-09

## 2019-10-09 ENCOUNTER — OUTPATIENT (OUTPATIENT)
Dept: OUTPATIENT SERVICES | Facility: HOSPITAL | Age: 64
LOS: 1 days | End: 2019-10-09
Payer: MEDICAID

## 2019-10-09 ENCOUNTER — APPOINTMENT (OUTPATIENT)
Dept: ELECTROPHYSIOLOGY | Facility: CLINIC | Age: 64
End: 2019-10-09
Payer: MEDICAID

## 2019-10-09 VITALS
DIASTOLIC BLOOD PRESSURE: 84 MMHG | HEART RATE: 67 BPM | WEIGHT: 157 LBS | HEIGHT: 65 IN | BODY MASS INDEX: 26.16 KG/M2 | OXYGEN SATURATION: 98 % | SYSTOLIC BLOOD PRESSURE: 133 MMHG

## 2019-10-09 DIAGNOSIS — Z98.89 OTHER SPECIFIED POSTPROCEDURAL STATES: Chronic | ICD-10-CM

## 2019-10-09 DIAGNOSIS — I25.10 ATHEROSCLEROTIC HEART DISEASE OF NATIVE CORONARY ARTERY WITHOUT ANGINA PECTORIS: ICD-10-CM

## 2019-10-09 PROCEDURE — 93005 ELECTROCARDIOGRAM TRACING: CPT

## 2019-10-09 PROCEDURE — 93282 PRGRMG EVAL IMPLANTABLE DFB: CPT

## 2019-10-09 PROCEDURE — G0463: CPT

## 2019-10-09 NOTE — PHYSICAL EXAM
[General Appearance - Well Developed] : well developed [Normal Appearance] : normal appearance [Well Groomed] : well groomed [General Appearance - Well Nourished] : well nourished [No Deformities] : no deformities [General Appearance - In No Acute Distress] : no acute distress [Normal Conjunctiva] : the conjunctiva exhibited no abnormalities [Eyelids - No Xanthelasma] : the eyelids demonstrated no xanthelasmas [Normal Oral Mucosa] : normal oral mucosa [No Oral Pallor] : no oral pallor [No Oral Cyanosis] : no oral cyanosis [Respiration, Rhythm And Depth] : normal respiratory rhythm and effort [Exaggerated Use Of Accessory Muscles For Inspiration] : no accessory muscle use [Auscultation Breath Sounds / Voice Sounds] : lungs were clear to auscultation bilaterally [Heart Rate And Rhythm] : heart rate and rhythm were normal [Heart Sounds] : normal S1 and S2 [Murmurs] : no murmurs present [Petechial Hemorrhages (___cm)] : no petechial hemorrhages [Cyanosis, Localized] : no localized cyanosis [Nail Clubbing] : no clubbing of the fingernails [Skin Color & Pigmentation] : normal skin color and pigmentation [] : no rash [No Venous Stasis] : no venous stasis [Skin Lesions] : no skin lesions [No Xanthoma] : no  xanthoma was observed [No Skin Ulcers] : no skin ulcer

## 2019-10-10 NOTE — HISTORY OF PRESENT ILLNESS
[FreeTextEntry1] : 62 y/o M w/ hx of STEMI 12/2013 s/p BMS to proximal LAD, and ICM HFrEF 30-35% s/p ICD for primary prevention is here for follow up. Mr. Farr had an echo performed in 9/2017 which continued to show severe segmental LV dysfunction as well as an aneurysmal apex. \par \par Since visit in 5/2019, pt has been tolerating increased Toprol 75mg from 50mg.  Does not check BP at home, however in office BP elevated 133/85.  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  No changes in weight, denies CP, SOLORZANO, LE edema, PND/orthopnea. HAs had no shocks or palpitations.

## 2019-10-10 NOTE — ASSESSMENT
[FreeTextEntry1] : Mr. Farr is a 63 y/o hx of STEMI 05/2013 s/p BMS to LAD, decreased EF 30-35% s/p ICD 3/2014 for primary prevention here for f/u.\par \par #ICM\par -pLAD PCI in 2013\par -25-30% LVIDd 5.3cm (TTE 9/17) s/p single lead Medtronic Evera ICD \par -short run NSVT, suspected SVT on interrogation, no treated events and asx\par -on ASA/Plavix (pt prefers to continue with Plavix for now)\par -will increase Lisinopril from 10mg to 15mg given elevated BP in office\par -c/w Toprol 75mg\par -repeat BMP in 2 weeks\par -pt advised on s/sx low BP\par -RTC in 3 months\par \par #HLD\par -c/w statin, most recent LDL of 39 (outside labs 4/19) at goal \par \par Discussed w/ Dr. Noriega\par Lisandro Christiansen, PGY4

## 2019-10-11 DIAGNOSIS — I42.9 CARDIOMYOPATHY, UNSPECIFIED: ICD-10-CM

## 2019-11-15 ENCOUNTER — EMERGENCY (EMERGENCY)
Facility: HOSPITAL | Age: 64
LOS: 1 days | Discharge: HOME | End: 2019-11-15
Attending: INTERNAL MEDICINE | Admitting: INTERNAL MEDICINE
Payer: MEDICAID

## 2019-11-15 VITALS
DIASTOLIC BLOOD PRESSURE: 78 MMHG | RESPIRATION RATE: 15 BRPM | OXYGEN SATURATION: 98 % | HEART RATE: 78 BPM | TEMPERATURE: 97 F | SYSTOLIC BLOOD PRESSURE: 124 MMHG

## 2019-11-15 VITALS
WEIGHT: 158.95 LBS | TEMPERATURE: 97 F | HEART RATE: 80 BPM | DIASTOLIC BLOOD PRESSURE: 86 MMHG | RESPIRATION RATE: 14 BRPM | SYSTOLIC BLOOD PRESSURE: 145 MMHG | OXYGEN SATURATION: 100 %

## 2019-11-15 DIAGNOSIS — Z98.89 OTHER SPECIFIED POSTPROCEDURAL STATES: Chronic | ICD-10-CM

## 2019-11-15 LAB — TROPONIN I SERPL-MCNC: <.015 NG/ML — SIGNIFICANT CHANGE UP (ref 0.01–0.04)

## 2019-11-15 PROCEDURE — 99284 EMERGENCY DEPT VISIT MOD MDM: CPT

## 2019-11-15 PROCEDURE — 99283 EMERGENCY DEPT VISIT LOW MDM: CPT

## 2019-11-15 PROCEDURE — 84484 ASSAY OF TROPONIN QUANT: CPT

## 2019-11-15 PROCEDURE — 36415 COLL VENOUS BLD VENIPUNCTURE: CPT

## 2019-11-15 NOTE — ED PROVIDER NOTE - PROGRESS NOTE DETAILS
tried to access old ekg but none available on sunrise, patient agreed to have troponin sent to determine age of ekg findings, troponin was negative plan DC patient and f/u with his cardiologist
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
## 2019-11-15 NOTE — ED PROVIDER NOTE - PATIENT PORTAL LINK FT
You can access the FollowMyHealth Patient Portal offered by [facility] by registering at the following website: [email or web address]. By joining [employer]’s FollowMyHealth portal, you will also be able to view your health information using other applications (apps) compatible with our system.
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
## 2019-11-15 NOTE — ED PROVIDER NOTE - SIGNIFICANT NEGATIVE FINDINGS
no headache, no chest pain, no SOB, no syncope , no n/v/d, no urinary symptoms, no bleeding. no neuro changes.

## 2019-11-15 NOTE — ED ADULT NURSE NOTE - CHPI ED NUR SYMPTOMS NEG
no congestion/no back pain/no diaphoresis/no syncope/no fever/no chest pain/no dizziness/no chills/no nausea/no shortness of breath/no vomiting

## 2019-12-12 ENCOUNTER — EMERGENCY (EMERGENCY)
Facility: HOSPITAL | Age: 64
LOS: 1 days | Discharge: HOME | End: 2019-12-12
Attending: EMERGENCY MEDICINE | Admitting: EMERGENCY MEDICINE
Payer: MEDICAID

## 2019-12-12 VITALS
TEMPERATURE: 97 F | HEART RATE: 84 BPM | WEIGHT: 158.07 LBS | OXYGEN SATURATION: 99 % | SYSTOLIC BLOOD PRESSURE: 130 MMHG | RESPIRATION RATE: 18 BRPM | DIASTOLIC BLOOD PRESSURE: 76 MMHG

## 2019-12-12 DIAGNOSIS — Z98.89 OTHER SPECIFIED POSTPROCEDURAL STATES: Chronic | ICD-10-CM

## 2019-12-12 PROCEDURE — 99283 EMERGENCY DEPT VISIT LOW MDM: CPT | Mod: 25

## 2019-12-12 PROCEDURE — 73130 X-RAY EXAM OF HAND: CPT | Mod: 26,LT

## 2019-12-12 PROCEDURE — 73130 X-RAY EXAM OF HAND: CPT

## 2019-12-12 PROCEDURE — 99283 EMERGENCY DEPT VISIT LOW MDM: CPT

## 2019-12-12 NOTE — ED PROVIDER NOTE - NSFOLLOWUPINSTRUCTIONS_ED_ALL_ED_FT
follow up with dermatologist Dr Hdz tomorrow in  his [place] office at 1015  any concerns, signs of infection return to ED

## 2019-12-12 NOTE — ED PROVIDER NOTE - PROGRESS NOTE DETAILS
spoke with Dr Hdz, Derm  office, case discussed, Dr Hdz in surgery today, can see patient at [place] office tmrw [phone], patient informed of plan of care, advised if any concerns can return to ED.  xray of hand no acute findings.

## 2019-12-12 NOTE — ED PROVIDER NOTE - ATTENDING CONTRIBUTION TO CARE
63 yo M p/w developed L "blister " on hand yesterday. No known trauma. no fever/chills. no discharge. no agg/allev factors. No recent illness. no recent trauma. no neck / back pain. no prox spread. no agg/allev factors. No other inj or co.  exam: L hand with 1x1 cm elevated blister type lesion with no surr erythema - on prox L thumb> FROM with no joint involvement. nl dist str/sens equal bl, 2+ pulses. nl cap refil.. Nl rest of hand. no other acute findings.  XR with no acute  PA will have pt fu with Derm tomorrow

## 2019-12-12 NOTE — ED PROVIDER NOTE - CHPI ED SYMPTOMS NEG
no pain/no red streaks/no chills/no fever/no bleeding at site/no purulent drainage/no drainage/no redness/no bleeding

## 2019-12-12 NOTE — ED PROVIDER NOTE - PATIENT PORTAL LINK FT
You can access the FollowMyHealth Patient Portal offered by [facility] by registering at the following website: [email or web address]. By joining [employer]’s FollowMyHealth portal, you will also be able to view your health information using other applications (apps) compatible with our system.

## 2019-12-12 NOTE — ED PROVIDER NOTE - CLINICAL SUMMARY MEDICAL DECISION MAKING FREE TEXT BOX
closed blood blister, posterior left 5th metacarpal head,   hx of dermatologic procedure 2 weeks ago, otherwise asymptomatic , patient to follow up with his dermatolgist

## 2019-12-12 NOTE — ED ADULT NURSE NOTE - OBJECTIVE STATEMENT
received pt in bed #6a Pt A&O states had something removed from left hand 2 weeks ago & woke up this AM with blood blister Pt seen by Anand Lerner Will monitor

## 2019-12-23 ENCOUNTER — EMERGENCY (EMERGENCY)
Facility: HOSPITAL | Age: 64
LOS: 1 days | Discharge: HOME | End: 2019-12-23
Attending: EMERGENCY MEDICINE | Admitting: EMERGENCY MEDICINE
Payer: MEDICAID

## 2019-12-23 VITALS
RESPIRATION RATE: 18 BRPM | DIASTOLIC BLOOD PRESSURE: 77 MMHG | SYSTOLIC BLOOD PRESSURE: 122 MMHG | OXYGEN SATURATION: 97 % | TEMPERATURE: 98 F | HEART RATE: 82 BPM

## 2019-12-23 VITALS
OXYGEN SATURATION: 99 % | HEART RATE: 76 BPM | DIASTOLIC BLOOD PRESSURE: 88 MMHG | HEIGHT: 65 IN | SYSTOLIC BLOOD PRESSURE: 137 MMHG | TEMPERATURE: 98 F | WEIGHT: 158.07 LBS | RESPIRATION RATE: 16 BRPM

## 2019-12-23 DIAGNOSIS — Z98.89 OTHER SPECIFIED POSTPROCEDURAL STATES: Chronic | ICD-10-CM

## 2019-12-23 PROBLEM — I10 ESSENTIAL (PRIMARY) HYPERTENSION: Chronic | Status: ACTIVE | Noted: 2019-12-12

## 2019-12-23 PROBLEM — E78.5 HYPERLIPIDEMIA, UNSPECIFIED: Chronic | Status: ACTIVE | Noted: 2019-12-12

## 2019-12-23 PROCEDURE — 71046 X-RAY EXAM CHEST 2 VIEWS: CPT | Mod: 26

## 2019-12-23 PROCEDURE — 94640 AIRWAY INHALATION TREATMENT: CPT

## 2019-12-23 PROCEDURE — 99283 EMERGENCY DEPT VISIT LOW MDM: CPT | Mod: 25

## 2019-12-23 PROCEDURE — 71046 X-RAY EXAM CHEST 2 VIEWS: CPT

## 2019-12-23 PROCEDURE — 99283 EMERGENCY DEPT VISIT LOW MDM: CPT

## 2019-12-23 RX ORDER — IPRATROPIUM/ALBUTEROL SULFATE 18-103MCG
3 AEROSOL WITH ADAPTER (GRAM) INHALATION ONCE
Refills: 0 | Status: COMPLETED | OUTPATIENT
Start: 2019-12-23 | End: 2019-12-23

## 2019-12-23 RX ORDER — ALBUTEROL 90 UG/1
2 AEROSOL, METERED ORAL EVERY 6 HOURS
Refills: 0 | Status: DISCONTINUED | OUTPATIENT
Start: 2019-12-23 | End: 2019-12-30

## 2019-12-23 RX ADMIN — ALBUTEROL 2 PUFF(S): 90 AEROSOL, METERED ORAL at 04:40

## 2019-12-23 RX ADMIN — Medication 60 MILLIGRAM(S): at 03:35

## 2019-12-23 RX ADMIN — Medication 3 MILLILITER(S): at 03:35

## 2019-12-23 NOTE — ED ADULT NURSE NOTE - CHPI ED NUR SYMPTOMS NEG
no fever/no pain/no weakness/no decreased eating/drinking/no dizziness/no nausea/no chills/no tingling/no vomiting

## 2019-12-23 NOTE — ED PROVIDER NOTE - PATIENT PORTAL LINK FT
You can access the FollowMyHealth Patient Portal offered by [facility] by registering at the following website: [email or web address]. By joining [employer]’s FollowMyHealth portal, you will also be able to view your health information using other applications (apps) compatible with our system.

## 2019-12-23 NOTE — ED ADULT NURSE NOTE - OBJECTIVE STATEMENT
63 y/o M patient presents to ED from home c/o cough x3 days worsening yesterday. As per patient cough is intermittent and made worse upon exertion. Patient denies sick contacts at home. Patient A&Ox4. lungs CTA. skin warm and intact. abdomen soft. Patient denies HA, dizziness, SOB, abdominal pain, N/V/D. Safety and comfort measures provided and maintained. MD bedside.

## 2019-12-23 NOTE — ED ADULT NURSE NOTE - CADM POA CENTRAL LINE
59yo  female PMHx asthma, COPD, current smoker unspecified amount of PPD), no hospitalizations or intubations presents with SOB, nonpurulent cough for a few days, found to have a LLL infiltrate, admitted and managed for COPD exacerbation 2/2 sepsis in the setting of CAP. No

## 2020-01-29 ENCOUNTER — APPOINTMENT (OUTPATIENT)
Dept: ELECTROPHYSIOLOGY | Facility: CLINIC | Age: 65
End: 2020-01-29
Payer: MEDICAID

## 2020-01-29 ENCOUNTER — APPOINTMENT (OUTPATIENT)
Dept: CARDIOLOGY | Facility: HOSPITAL | Age: 65
End: 2020-01-29

## 2020-01-29 ENCOUNTER — OUTPATIENT (OUTPATIENT)
Dept: OUTPATIENT SERVICES | Facility: HOSPITAL | Age: 65
LOS: 1 days | End: 2020-01-29
Payer: MEDICAID

## 2020-01-29 VITALS
HEART RATE: 63 BPM | OXYGEN SATURATION: 99 % | BODY MASS INDEX: 26.16 KG/M2 | HEIGHT: 65 IN | DIASTOLIC BLOOD PRESSURE: 85 MMHG | WEIGHT: 157 LBS | SYSTOLIC BLOOD PRESSURE: 145 MMHG

## 2020-01-29 DIAGNOSIS — Z98.89 OTHER SPECIFIED POSTPROCEDURAL STATES: Chronic | ICD-10-CM

## 2020-01-29 DIAGNOSIS — I25.10 ATHEROSCLEROTIC HEART DISEASE OF NATIVE CORONARY ARTERY WITHOUT ANGINA PECTORIS: ICD-10-CM

## 2020-01-29 PROCEDURE — 93005 ELECTROCARDIOGRAM TRACING: CPT

## 2020-01-29 PROCEDURE — 93282 PRGRMG EVAL IMPLANTABLE DFB: CPT

## 2020-01-29 PROCEDURE — G0463: CPT

## 2020-01-29 NOTE — ASSESSMENT
[FreeTextEntry1] : Mr. Farr is a 63 y/o hx of STEMI 05/2013 s/p BMS to LAD, decreased EF 30-35% s/p ICD 3/2014 for primary prevention here for f/u.\par \par #ICM\par -pLAD PCI in 2013\par -25-30% LVIDd 5.3cm (TTE 9/17) s/p single lead Medtronic Evera ICD \par -awaiting device report today\par -on ASA/Plavix (pt prefers to continue with Plavix for now)\par -increase Lisinopril 20mg from 15mg (recent BMP wnl after prior increase in ACEi)\par -pt advised on s/sx low BP\par -repeat TTE given BP remains mildly elevated\par -RTC in 3 months\par \par #HLD\par -c/w statin\par \par #HTN\par -Toprol, Lisinopril as above\par -will consider starting spironolactone pending repeat TTE and BP checks\par \par Will discuss w/ attending\par Lisandro Christiansen, PGY4

## 2020-01-29 NOTE — PHYSICAL EXAM
[General Appearance - Well Developed] : well developed [General Appearance - Well Nourished] : well nourished [Normal Appearance] : normal appearance [Well Groomed] : well groomed [General Appearance - In No Acute Distress] : no acute distress [Normal Oral Mucosa] : normal oral mucosa [No Deformities] : no deformities [Normal Jugular Venous A Waves Present] : normal jugular venous A waves present [No Oral Pallor] : no oral pallor [No Oral Cyanosis] : no oral cyanosis [Normal Jugular Venous V Waves Present] : normal jugular venous V waves present [No Jugular Venous Morrissey A Waves] : no jugular venous morrissey A waves [Heart Rate And Rhythm] : heart rate and rhythm were normal [Heart Sounds] : normal S1 and S2 [Murmurs] : no murmurs present [Abdomen Tenderness] : non-tender [Abdomen Soft] : soft [Nail Clubbing] : no clubbing of the fingernails [Abdomen Mass (___ Cm)] : no abdominal mass palpated [Petechial Hemorrhages (___cm)] : no petechial hemorrhages [Cyanosis, Localized] : no localized cyanosis [] : no ischemic changes

## 2020-01-29 NOTE — HISTORY OF PRESENT ILLNESS
[FreeTextEntry1] : 62 y/o M w/ hx of STEMI 12/2013 s/p BMS to proximal LAD, and ICM HFrEF 30-35% s/p ICD for primary prevention is here for follow up. Mr. Farr had an echo performed in 9/2017 which continued to show severe segmental LV dysfunction as well as an aneurysmal apex. \par \par Since visit in in 10/2019, pt has been tolerating increased toprol 75mg and Lisinopril 15mg from 10mg.  PT states he bought a BP cuff from [employer], had checked it a few times however states the cuff would inflate to SBP 200s and cause pain so he stopped checking it.  Pt recently had a PCP visit where he states BP was 122/80.  In November, pt had an ED visit for palpitations after a heated argument at work, had an uneventful ED visit and subsequent discharge with after normal EKG and enzymes.  No changes in weight, denies CP, SOLORZANO, LE edema, PND/orthopnea. HAs had no shocks or palpitations. \par

## 2020-01-31 DIAGNOSIS — I42.9 CARDIOMYOPATHY, UNSPECIFIED: ICD-10-CM

## 2020-01-31 DIAGNOSIS — I10 ESSENTIAL (PRIMARY) HYPERTENSION: ICD-10-CM

## 2020-02-16 ENCOUNTER — EMERGENCY (EMERGENCY)
Facility: HOSPITAL | Age: 65
LOS: 1 days | Discharge: ANOTHER HOSPITAL | End: 2020-02-16
Attending: EMERGENCY MEDICINE | Admitting: EMERGENCY MEDICINE
Payer: MEDICAID

## 2020-02-16 ENCOUNTER — INPATIENT (INPATIENT)
Facility: HOSPITAL | Age: 65
LOS: 7 days | Discharge: HOME | DRG: 245 | End: 2020-02-24
Attending: INTERNAL MEDICINE | Admitting: INTERNAL MEDICINE
Payer: MEDICAID

## 2020-02-16 VITALS
DIASTOLIC BLOOD PRESSURE: 35 MMHG | OXYGEN SATURATION: 100 % | RESPIRATION RATE: 20 BRPM | SYSTOLIC BLOOD PRESSURE: 57 MMHG | HEART RATE: 170 BPM | WEIGHT: 158.07 LBS

## 2020-02-16 VITALS
SYSTOLIC BLOOD PRESSURE: 104 MMHG | WEIGHT: 155.65 LBS | OXYGEN SATURATION: 99 % | RESPIRATION RATE: 16 BRPM | TEMPERATURE: 98 F | HEART RATE: 50 BPM | HEIGHT: 65 IN | DIASTOLIC BLOOD PRESSURE: 63 MMHG

## 2020-02-16 VITALS
DIASTOLIC BLOOD PRESSURE: 64 MMHG | OXYGEN SATURATION: 100 % | RESPIRATION RATE: 18 BRPM | SYSTOLIC BLOOD PRESSURE: 101 MMHG | HEART RATE: 65 BPM

## 2020-02-16 DIAGNOSIS — E78.5 HYPERLIPIDEMIA, UNSPECIFIED: ICD-10-CM

## 2020-02-16 DIAGNOSIS — Z98.89 OTHER SPECIFIED POSTPROCEDURAL STATES: Chronic | ICD-10-CM

## 2020-02-16 DIAGNOSIS — Z87.891 PERSONAL HISTORY OF NICOTINE DEPENDENCE: ICD-10-CM

## 2020-02-16 DIAGNOSIS — Z79.82 LONG TERM (CURRENT) USE OF ASPIRIN: ICD-10-CM

## 2020-02-16 DIAGNOSIS — R11.10 VOMITING, UNSPECIFIED: ICD-10-CM

## 2020-02-16 DIAGNOSIS — Z79.899 OTHER LONG TERM (CURRENT) DRUG THERAPY: ICD-10-CM

## 2020-02-16 DIAGNOSIS — Z95.5 PRESENCE OF CORONARY ANGIOPLASTY IMPLANT AND GRAFT: ICD-10-CM

## 2020-02-16 DIAGNOSIS — I21.3 ST ELEVATION (STEMI) MYOCARDIAL INFARCTION OF UNSPECIFIED SITE: ICD-10-CM

## 2020-02-16 DIAGNOSIS — I10 ESSENTIAL (PRIMARY) HYPERTENSION: ICD-10-CM

## 2020-02-16 LAB
ALBUMIN SERPL ELPH-MCNC: 3.4 G/DL — SIGNIFICANT CHANGE UP (ref 3.3–5)
ALBUMIN SERPL ELPH-MCNC: 3.5 G/DL — SIGNIFICANT CHANGE UP (ref 3.3–5)
ALP SERPL-CCNC: 57 U/L — SIGNIFICANT CHANGE UP (ref 40–120)
ALP SERPL-CCNC: 67 U/L — SIGNIFICANT CHANGE UP (ref 40–120)
ALT FLD-CCNC: 31 U/L — SIGNIFICANT CHANGE UP (ref 10–45)
ALT FLD-CCNC: 40 U/L — SIGNIFICANT CHANGE UP (ref 12–78)
ANION GAP SERPL CALC-SCNC: 16 MMOL/L — SIGNIFICANT CHANGE UP (ref 5–17)
ANION GAP SERPL CALC-SCNC: 9 MMOL/L — SIGNIFICANT CHANGE UP (ref 5–17)
APTT BLD: 123.1 SEC — CRITICAL HIGH (ref 27.5–36.3)
APTT BLD: 24.6 SEC — LOW (ref 28.5–37)
AST SERPL-CCNC: 35 U/L — SIGNIFICANT CHANGE UP (ref 15–37)
AST SERPL-CCNC: 48 U/L — HIGH (ref 10–40)
BASOPHILS # BLD AUTO: 0.04 K/UL — SIGNIFICANT CHANGE UP (ref 0–0.2)
BASOPHILS # BLD AUTO: 0.06 K/UL — SIGNIFICANT CHANGE UP (ref 0–0.2)
BASOPHILS NFR BLD AUTO: 0.3 % — SIGNIFICANT CHANGE UP (ref 0–2)
BASOPHILS NFR BLD AUTO: 0.7 % — SIGNIFICANT CHANGE UP (ref 0–2)
BILIRUB SERPL-MCNC: 0.7 MG/DL — SIGNIFICANT CHANGE UP (ref 0.2–1.2)
BILIRUB SERPL-MCNC: 1.3 MG/DL — HIGH (ref 0.2–1.2)
BLD GP AB SCN SERPL QL: NEGATIVE — SIGNIFICANT CHANGE UP
BUN SERPL-MCNC: 30 MG/DL — HIGH (ref 7–23)
BUN SERPL-MCNC: 33 MG/DL — HIGH (ref 7–23)
CALCIUM SERPL-MCNC: 8.7 MG/DL — SIGNIFICANT CHANGE UP (ref 8.4–10.5)
CALCIUM SERPL-MCNC: 9.1 MG/DL — SIGNIFICANT CHANGE UP (ref 8.5–10.1)
CHLORIDE SERPL-SCNC: 108 MMOL/L — SIGNIFICANT CHANGE UP (ref 96–108)
CHLORIDE SERPL-SCNC: 109 MMOL/L — HIGH (ref 96–108)
CK MB BLD-MCNC: 9.3 % — HIGH (ref 0–3.5)
CK MB CFR SERPL CALC: 36.8 NG/ML — HIGH (ref 0–6.7)
CK MB CFR SERPL CALC: 5.6 NG/ML — HIGH (ref 0–3.6)
CK SERPL-CCNC: 397 U/L — HIGH (ref 30–200)
CO2 SERPL-SCNC: 18 MMOL/L — LOW (ref 22–31)
CO2 SERPL-SCNC: 24 MMOL/L — SIGNIFICANT CHANGE UP (ref 22–31)
CREAT SERPL-MCNC: 1.1 MG/DL — SIGNIFICANT CHANGE UP (ref 0.5–1.3)
CREAT SERPL-MCNC: 1.5 MG/DL — HIGH (ref 0.5–1.3)
EOSINOPHIL # BLD AUTO: 0.02 K/UL — SIGNIFICANT CHANGE UP (ref 0–0.5)
EOSINOPHIL # BLD AUTO: 0.15 K/UL — SIGNIFICANT CHANGE UP (ref 0–0.5)
EOSINOPHIL NFR BLD AUTO: 0.2 % — SIGNIFICANT CHANGE UP (ref 0–6)
EOSINOPHIL NFR BLD AUTO: 1.8 % — SIGNIFICANT CHANGE UP (ref 0–6)
GAS PNL BLDV: SIGNIFICANT CHANGE UP
GLUCOSE SERPL-MCNC: 123 MG/DL — HIGH (ref 70–99)
GLUCOSE SERPL-MCNC: 123 MG/DL — HIGH (ref 70–99)
HCT VFR BLD CALC: 41.2 % — SIGNIFICANT CHANGE UP (ref 39–50)
HCT VFR BLD CALC: 45.7 % — SIGNIFICANT CHANGE UP (ref 39–50)
HGB BLD-MCNC: 14 G/DL — SIGNIFICANT CHANGE UP (ref 13–17)
HGB BLD-MCNC: 15.1 G/DL — SIGNIFICANT CHANGE UP (ref 13–17)
IMM GRANULOCYTES NFR BLD AUTO: 0.2 % — SIGNIFICANT CHANGE UP (ref 0–1.5)
IMM GRANULOCYTES NFR BLD AUTO: 0.5 % — SIGNIFICANT CHANGE UP (ref 0–1.5)
INR BLD: 1.1 RATIO — SIGNIFICANT CHANGE UP (ref 0.88–1.16)
INR BLD: 1.17 RATIO — HIGH (ref 0.88–1.16)
LACTATE SERPL-SCNC: 0.9 MMOL/L — SIGNIFICANT CHANGE UP (ref 0.7–2)
LIDOCAIN IGE QN: 104 U/L — SIGNIFICANT CHANGE UP (ref 73–393)
LYMPHOCYTES # BLD AUTO: 0.7 K/UL — LOW (ref 1–3.3)
LYMPHOCYTES # BLD AUTO: 1.77 K/UL — SIGNIFICANT CHANGE UP (ref 1–3.3)
LYMPHOCYTES # BLD AUTO: 20.8 % — SIGNIFICANT CHANGE UP (ref 13–44)
LYMPHOCYTES # BLD AUTO: 5.4 % — LOW (ref 13–44)
MAGNESIUM SERPL-MCNC: 2 MG/DL — SIGNIFICANT CHANGE UP (ref 1.6–2.6)
MCHC RBC-ENTMCNC: 29.2 PG — SIGNIFICANT CHANGE UP (ref 27–34)
MCHC RBC-ENTMCNC: 29.8 PG — SIGNIFICANT CHANGE UP (ref 27–34)
MCHC RBC-ENTMCNC: 33 GM/DL — SIGNIFICANT CHANGE UP (ref 32–36)
MCHC RBC-ENTMCNC: 34 GM/DL — SIGNIFICANT CHANGE UP (ref 32–36)
MCV RBC AUTO: 87.7 FL — SIGNIFICANT CHANGE UP (ref 80–100)
MCV RBC AUTO: 88.2 FL — SIGNIFICANT CHANGE UP (ref 80–100)
MONOCYTES # BLD AUTO: 0.62 K/UL — SIGNIFICANT CHANGE UP (ref 0–0.9)
MONOCYTES # BLD AUTO: 0.69 K/UL — SIGNIFICANT CHANGE UP (ref 0–0.9)
MONOCYTES NFR BLD AUTO: 5.3 % — SIGNIFICANT CHANGE UP (ref 2–14)
MONOCYTES NFR BLD AUTO: 7.3 % — SIGNIFICANT CHANGE UP (ref 2–14)
NEUTROPHILS # BLD AUTO: 11.48 K/UL — HIGH (ref 1.8–7.4)
NEUTROPHILS # BLD AUTO: 5.88 K/UL — SIGNIFICANT CHANGE UP (ref 1.8–7.4)
NEUTROPHILS NFR BLD AUTO: 69.2 % — SIGNIFICANT CHANGE UP (ref 43–77)
NEUTROPHILS NFR BLD AUTO: 88.3 % — HIGH (ref 43–77)
NRBC # BLD: 0 /100 WBCS — SIGNIFICANT CHANGE UP (ref 0–0)
NRBC # BLD: 0 /100 WBCS — SIGNIFICANT CHANGE UP (ref 0–0)
NT-PROBNP SERPL-SCNC: 1278 PG/ML — HIGH (ref 0–300)
NT-PROBNP SERPL-SCNC: 1769 PG/ML — HIGH (ref 0–125)
PHOSPHATE SERPL-MCNC: 3.4 MG/DL — SIGNIFICANT CHANGE UP (ref 2.5–4.5)
PLATELET # BLD AUTO: 151 K/UL — SIGNIFICANT CHANGE UP (ref 150–400)
PLATELET # BLD AUTO: 168 K/UL — SIGNIFICANT CHANGE UP (ref 150–400)
POTASSIUM SERPL-MCNC: 3.8 MMOL/L — SIGNIFICANT CHANGE UP (ref 3.5–5.3)
POTASSIUM SERPL-MCNC: 3.9 MMOL/L — SIGNIFICANT CHANGE UP (ref 3.5–5.3)
POTASSIUM SERPL-SCNC: 3.8 MMOL/L — SIGNIFICANT CHANGE UP (ref 3.5–5.3)
POTASSIUM SERPL-SCNC: 3.9 MMOL/L — SIGNIFICANT CHANGE UP (ref 3.5–5.3)
PROT SERPL-MCNC: 6 G/DL — SIGNIFICANT CHANGE UP (ref 6–8.3)
PROT SERPL-MCNC: 6.8 G/DL — SIGNIFICANT CHANGE UP (ref 6–8.3)
PROTHROM AB SERPL-ACNC: 12.3 SEC — SIGNIFICANT CHANGE UP (ref 10–12.9)
PROTHROM AB SERPL-ACNC: 13.5 SEC — HIGH (ref 10–12.9)
RBC # BLD: 4.7 M/UL — SIGNIFICANT CHANGE UP (ref 4.2–5.8)
RBC # BLD: 5.18 M/UL — SIGNIFICANT CHANGE UP (ref 4.2–5.8)
RBC # FLD: 13.8 % — SIGNIFICANT CHANGE UP (ref 10.3–14.5)
RBC # FLD: 14 % — SIGNIFICANT CHANGE UP (ref 10.3–14.5)
RH IG SCN BLD-IMP: POSITIVE — SIGNIFICANT CHANGE UP
SODIUM SERPL-SCNC: 142 MMOL/L — SIGNIFICANT CHANGE UP (ref 135–145)
SODIUM SERPL-SCNC: 142 MMOL/L — SIGNIFICANT CHANGE UP (ref 135–145)
TROPONIN I SERPL-MCNC: 0.07 NG/ML — HIGH (ref 0.01–0.04)
TROPONIN T, HIGH SENSITIVITY RESULT: 413 NG/L — HIGH (ref 0–51)
TSH SERPL-MCNC: 5.05 UIU/ML — HIGH (ref 0.36–3.74)
WBC # BLD: 12.99 K/UL — HIGH (ref 3.8–10.5)
WBC # BLD: 8.5 K/UL — SIGNIFICANT CHANGE UP (ref 3.8–10.5)
WBC # FLD AUTO: 12.99 K/UL — HIGH (ref 3.8–10.5)
WBC # FLD AUTO: 8.5 K/UL — SIGNIFICANT CHANGE UP (ref 3.8–10.5)

## 2020-02-16 PROCEDURE — 76937 US GUIDE VASCULAR ACCESS: CPT | Mod: 26

## 2020-02-16 PROCEDURE — 99291 CRITICAL CARE FIRST HOUR: CPT

## 2020-02-16 PROCEDURE — 36620 INSERTION CATHETER ARTERY: CPT

## 2020-02-16 PROCEDURE — 36415 COLL VENOUS BLD VENIPUNCTURE: CPT

## 2020-02-16 PROCEDURE — 36556 INSERT NON-TUNNEL CV CATH: CPT

## 2020-02-16 PROCEDURE — 83690 ASSAY OF LIPASE: CPT

## 2020-02-16 PROCEDURE — 85027 COMPLETE CBC AUTOMATED: CPT

## 2020-02-16 PROCEDURE — 71045 X-RAY EXAM CHEST 1 VIEW: CPT

## 2020-02-16 PROCEDURE — 93010 ELECTROCARDIOGRAM REPORT: CPT

## 2020-02-16 PROCEDURE — 71045 X-RAY EXAM CHEST 1 VIEW: CPT | Mod: 26,77

## 2020-02-16 PROCEDURE — 82553 CREATINE MB FRACTION: CPT

## 2020-02-16 PROCEDURE — 96361 HYDRATE IV INFUSION ADD-ON: CPT

## 2020-02-16 PROCEDURE — 93005 ELECTROCARDIOGRAM TRACING: CPT

## 2020-02-16 PROCEDURE — 85730 THROMBOPLASTIN TIME PARTIAL: CPT

## 2020-02-16 PROCEDURE — 82962 GLUCOSE BLOOD TEST: CPT

## 2020-02-16 PROCEDURE — 85610 PROTHROMBIN TIME: CPT

## 2020-02-16 PROCEDURE — 96375 TX/PRO/DX INJ NEW DRUG ADDON: CPT

## 2020-02-16 PROCEDURE — 99291 CRITICAL CARE FIRST HOUR: CPT | Mod: 25

## 2020-02-16 PROCEDURE — 84484 ASSAY OF TROPONIN QUANT: CPT

## 2020-02-16 PROCEDURE — 80053 COMPREHEN METABOLIC PANEL: CPT

## 2020-02-16 PROCEDURE — 96365 THER/PROPH/DIAG IV INF INIT: CPT

## 2020-02-16 PROCEDURE — 83880 ASSAY OF NATRIURETIC PEPTIDE: CPT

## 2020-02-16 PROCEDURE — 71045 X-RAY EXAM CHEST 1 VIEW: CPT | Mod: 26

## 2020-02-16 PROCEDURE — 84443 ASSAY THYROID STIM HORMONE: CPT

## 2020-02-16 RX ORDER — ASPIRIN/CALCIUM CARB/MAGNESIUM 324 MG
324 TABLET ORAL DAILY
Refills: 0 | Status: DISCONTINUED | OUTPATIENT
Start: 2020-02-16 | End: 2020-02-22

## 2020-02-16 RX ORDER — SODIUM CHLORIDE 9 MG/ML
1000 INJECTION INTRAMUSCULAR; INTRAVENOUS; SUBCUTANEOUS ONCE
Refills: 0 | Status: COMPLETED | OUTPATIENT
Start: 2020-02-16 | End: 2020-02-16

## 2020-02-16 RX ORDER — HEPARIN SODIUM 5000 [USP'U]/ML
4000 INJECTION INTRAVENOUS; SUBCUTANEOUS ONCE
Refills: 0 | Status: COMPLETED | OUTPATIENT
Start: 2020-02-16 | End: 2020-02-16

## 2020-02-16 RX ORDER — ONDANSETRON 8 MG/1
4 TABLET, FILM COATED ORAL ONCE
Refills: 0 | Status: COMPLETED | OUTPATIENT
Start: 2020-02-16 | End: 2020-02-16

## 2020-02-16 RX ORDER — NOREPINEPHRINE BITARTRATE/D5W 8 MG/250ML
0.06 PLASTIC BAG, INJECTION (ML) INTRAVENOUS
Qty: 8 | Refills: 0 | Status: DISCONTINUED | OUTPATIENT
Start: 2020-02-16 | End: 2020-02-17

## 2020-02-16 RX ORDER — FAMOTIDINE 10 MG/ML
20 INJECTION INTRAVENOUS ONCE
Refills: 0 | Status: COMPLETED | OUTPATIENT
Start: 2020-02-16 | End: 2020-02-16

## 2020-02-16 RX ORDER — HEPARIN SODIUM 5000 [USP'U]/ML
4000 INJECTION INTRAVENOUS; SUBCUTANEOUS EVERY 6 HOURS
Refills: 0 | Status: DISCONTINUED | OUTPATIENT
Start: 2020-02-16 | End: 2020-02-16

## 2020-02-16 RX ORDER — POTASSIUM CHLORIDE 20 MEQ
20 PACKET (EA) ORAL ONCE
Refills: 0 | Status: COMPLETED | OUTPATIENT
Start: 2020-02-16 | End: 2020-02-16

## 2020-02-16 RX ORDER — AMIODARONE HYDROCHLORIDE 400 MG/1
0.5 TABLET ORAL
Qty: 900 | Refills: 0 | Status: DISCONTINUED | OUTPATIENT
Start: 2020-02-17 | End: 2020-02-22

## 2020-02-16 RX ORDER — HEPARIN SODIUM 5000 [USP'U]/ML
700 INJECTION INTRAVENOUS; SUBCUTANEOUS
Qty: 25000 | Refills: 0 | Status: DISCONTINUED | OUTPATIENT
Start: 2020-02-16 | End: 2020-02-19

## 2020-02-16 RX ORDER — CLOPIDOGREL BISULFATE 75 MG/1
300 TABLET, FILM COATED ORAL ONCE
Refills: 0 | Status: COMPLETED | OUTPATIENT
Start: 2020-02-16 | End: 2020-02-16

## 2020-02-16 RX ORDER — HEPARIN SODIUM 5000 [USP'U]/ML
1000 INJECTION INTRAVENOUS; SUBCUTANEOUS
Qty: 25000 | Refills: 0 | Status: DISCONTINUED | OUTPATIENT
Start: 2020-02-16 | End: 2020-02-16

## 2020-02-16 RX ORDER — ACETAMINOPHEN 500 MG
1000 TABLET ORAL ONCE
Refills: 0 | Status: COMPLETED | OUTPATIENT
Start: 2020-02-16 | End: 2020-02-16

## 2020-02-16 RX ORDER — AMIODARONE HYDROCHLORIDE 400 MG/1
150 TABLET ORAL ONCE
Refills: 0 | Status: DISCONTINUED | OUTPATIENT
Start: 2020-02-16 | End: 2020-02-16

## 2020-02-16 RX ORDER — AMIODARONE HYDROCHLORIDE 400 MG/1
1 TABLET ORAL
Qty: 900 | Refills: 0 | Status: DISCONTINUED | OUTPATIENT
Start: 2020-02-16 | End: 2020-02-17

## 2020-02-16 RX ORDER — HEPARIN SODIUM 5000 [USP'U]/ML
4000 INJECTION INTRAVENOUS; SUBCUTANEOUS EVERY 6 HOURS
Refills: 0 | Status: DISCONTINUED | OUTPATIENT
Start: 2020-02-16 | End: 2020-02-19

## 2020-02-16 RX ORDER — AMIODARONE HYDROCHLORIDE 400 MG/1
150 TABLET ORAL ONCE
Refills: 0 | Status: COMPLETED | OUTPATIENT
Start: 2020-02-16 | End: 2020-02-16

## 2020-02-16 RX ORDER — ASPIRIN/CALCIUM CARB/MAGNESIUM 324 MG
81 TABLET ORAL DAILY
Refills: 0 | Status: DISCONTINUED | OUTPATIENT
Start: 2020-02-16 | End: 2020-02-24

## 2020-02-16 RX ORDER — HEPARIN SODIUM 5000 [USP'U]/ML
INJECTION INTRAVENOUS; SUBCUTANEOUS
Qty: 25000 | Refills: 0 | Status: DISCONTINUED | OUTPATIENT
Start: 2020-02-16 | End: 2020-02-22

## 2020-02-16 RX ORDER — ATORVASTATIN CALCIUM 80 MG/1
80 TABLET, FILM COATED ORAL AT BEDTIME
Refills: 0 | Status: DISCONTINUED | OUTPATIENT
Start: 2020-02-16 | End: 2020-02-24

## 2020-02-16 RX ORDER — SODIUM CHLORIDE 9 MG/ML
1000 INJECTION INTRAMUSCULAR; INTRAVENOUS; SUBCUTANEOUS
Refills: 0 | Status: DISCONTINUED | OUTPATIENT
Start: 2020-02-16 | End: 2020-02-19

## 2020-02-16 RX ORDER — CLOPIDOGREL BISULFATE 75 MG/1
75 TABLET, FILM COATED ORAL DAILY
Refills: 0 | Status: DISCONTINUED | OUTPATIENT
Start: 2020-02-17 | End: 2020-02-19

## 2020-02-16 RX ORDER — AMIODARONE HYDROCHLORIDE 400 MG/1
1 TABLET ORAL
Qty: 900 | Refills: 0 | Status: DISCONTINUED | OUTPATIENT
Start: 2020-02-16 | End: 2020-02-22

## 2020-02-16 RX ORDER — HEPARIN SODIUM 5000 [USP'U]/ML
4000 INJECTION INTRAVENOUS; SUBCUTANEOUS EVERY 6 HOURS
Refills: 0 | Status: DISCONTINUED | OUTPATIENT
Start: 2020-02-16 | End: 2020-02-22

## 2020-02-16 RX ORDER — CHLORHEXIDINE GLUCONATE 213 G/1000ML
1 SOLUTION TOPICAL
Refills: 0 | Status: DISCONTINUED | OUTPATIENT
Start: 2020-02-16 | End: 2020-02-24

## 2020-02-16 RX ORDER — NOREPINEPHRINE BITARTRATE/D5W 8 MG/250ML
0.05 PLASTIC BAG, INJECTION (ML) INTRAVENOUS
Qty: 8 | Refills: 0 | Status: DISCONTINUED | OUTPATIENT
Start: 2020-02-16 | End: 2020-02-22

## 2020-02-16 RX ORDER — SODIUM CHLORIDE 9 MG/ML
1000 INJECTION INTRAMUSCULAR; INTRAVENOUS; SUBCUTANEOUS ONCE
Refills: 0 | Status: DISCONTINUED | OUTPATIENT
Start: 2020-02-16 | End: 2020-02-22

## 2020-02-16 RX ADMIN — CLOPIDOGREL BISULFATE 300 MILLIGRAM(S): 75 TABLET, FILM COATED ORAL at 18:18

## 2020-02-16 RX ADMIN — Medication 400 MILLIGRAM(S): at 21:24

## 2020-02-16 RX ADMIN — SODIUM CHLORIDE 1000 MILLILITER(S): 9 INJECTION INTRAMUSCULAR; INTRAVENOUS; SUBCUTANEOUS at 17:58

## 2020-02-16 RX ADMIN — HEPARIN SODIUM 4000 UNIT(S): 5000 INJECTION INTRAVENOUS; SUBCUTANEOUS at 18:30

## 2020-02-16 RX ADMIN — AMIODARONE HYDROCHLORIDE 618 MILLIGRAM(S): 400 TABLET ORAL at 18:14

## 2020-02-16 RX ADMIN — AMIODARONE HYDROCHLORIDE 33.33 MG/MIN: 400 TABLET ORAL at 22:47

## 2020-02-16 RX ADMIN — Medication 7.94 MICROGRAM(S)/KG/MIN: at 21:24

## 2020-02-16 RX ADMIN — Medication 324 MILLIGRAM(S): at 18:17

## 2020-02-16 RX ADMIN — AMIODARONE HYDROCHLORIDE 150 MILLIGRAM(S): 400 TABLET ORAL at 19:12

## 2020-02-16 RX ADMIN — Medication 1000 MILLIGRAM(S): at 21:39

## 2020-02-16 RX ADMIN — Medication 20 MILLIEQUIVALENT(S): at 22:47

## 2020-02-16 RX ADMIN — HEPARIN SODIUM 700 UNIT(S)/HR: 5000 INJECTION INTRAVENOUS; SUBCUTANEOUS at 23:02

## 2020-02-16 RX ADMIN — Medication 6.72 MICROGRAM(S)/KG/MIN: at 18:11

## 2020-02-16 RX ADMIN — SODIUM CHLORIDE 1000 MILLILITER(S): 9 INJECTION INTRAMUSCULAR; INTRAVENOUS; SUBCUTANEOUS at 18:24

## 2020-02-16 RX ADMIN — HEPARIN SODIUM 900 UNIT(S)/HR: 5000 INJECTION INTRAVENOUS; SUBCUTANEOUS at 18:32

## 2020-02-16 RX ADMIN — AMIODARONE HYDROCHLORIDE 33.33 MG/MIN: 400 TABLET ORAL at 18:19

## 2020-02-16 RX ADMIN — FAMOTIDINE 20 MILLIGRAM(S): 10 INJECTION INTRAVENOUS at 18:39

## 2020-02-16 RX ADMIN — ONDANSETRON 4 MILLIGRAM(S): 8 TABLET, FILM COATED ORAL at 18:19

## 2020-02-16 RX ADMIN — ATORVASTATIN CALCIUM 80 MILLIGRAM(S): 80 TABLET, FILM COATED ORAL at 22:47

## 2020-02-16 NOTE — CONSULT NOTE ADULT - SUBJECTIVE AND OBJECTIVE BOX
CHIEF COMPLAINT: Patient is a 64y old  Male who presents with a chief complaint of syncope    HPI: 64 M with hx of AWMI s/p BMS to LAD in 2013, severe ICM s/p ICD (medtronic) presents with syncope and VT. States that was working today and had 4-5 minutes of mid sternal chest pain that was nonradiating and nonexertional. self limiting. Then when leaving to go home started to feel unwell. drove himself to ED. Here found to be hypotensive with VT at rate of 189. given amio 150mg x 1 externally defib with 200J x 1 and returned to SR. Now without cp. Denies any   PND, orthopnea, near syncope, syncope,  stroke like symptoms.   Still hypotensive and started on Levo gtt    EKG: intial EKG VT  post dccv ekg sr with AWMI mild ST elevation that is worse from baseline.     REVIEW OF SYSTEMS:   All other review of systems are negative unless indicated above    PAST MEDICAL & SURGICAL HISTORY:  Hyperlipidemia  HTN (hypertension)  Stented coronary artery  MI (myocardial infarction)  History of tonsillectomy      SOCIAL HISTORY:  No tobacco, ethanol, or drug abuse.    FAMILY HISTORY:  No family history of acute MI or sudden cardiac death.    MEDICATIONS  (STANDING):  ondansetron Injectable 4 milliGRAM(s) IV Push once  sodium chloride 0.9% Bolus 1000 milliLiter(s) IV Bolus once    MEDICATIONS  (PRN):      Allergies    No Known Allergies    Intolerances        Home meds:  Home Medications:  aspirin 81 mg oral tablet, chewable: 1 tab(s) orally once a day (23 Dec 2019 03:00)  atorvastatin 80 mg oral tablet: 1 tab(s) orally once a day (at bedtime) (23 Dec 2019 03:00)  clopidogrel 75 mg oral tablet: 1 tab(s) orally once a day (23 Dec 2019 03:00)  famotidine 20 mg oral tablet: 1 tab(s) orally once a day (23 Dec 2019 03:00)  lisinopril 10 mg oral tablet: 10.5 milligram(s) orally once a day (at bedtime) (23 Dec 2019 03:00)  metoprolol tartrate 75 mg oral tablet: 1 tab(s) orally once a day (23 Dec 2019 03:00)        VITAL SIGNS:   Vital Signs Last 24 Hrs  T(C): --  T(F): --  HR: 170 (16 Feb 2020 17:32) (170 - 170)  BP: 57/35 (16 Feb 2020 17:32) (57/35 - 57/35)  BP(mean): --  RR: 20 (16 Feb 2020 17:32) (20 - 20)  SpO2: 100% (16 Feb 2020 17:32) (100% - 100%)    I&O's Summary      On Exam:    Constitutional: diaphoretic  HEENT: Moist Mucous Membranes, Anicteric  Pulmonary: Decreased breath sounds b/l. No rales, crackles or wheeze appreciated. poor exam  Cardiovascular: Regular, S1 and S2, No murmurs, rubs, gallops or clicks  Gastrointestinal: Bowel Sounds present, soft, nontender.   Lymph: No peripheral edema. No lymphadenopathy.  Skin: No visible rashes or ulcers.  Psych:  Mood & affect appropriate    LABS: All Labs Reviewed:                Blood Culture:         RADIOLOGY:

## 2020-02-16 NOTE — ED PROVIDER NOTE - OBJECTIVE STATEMENT
64 male drove to ER c/o near syncope. 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

## 2020-02-16 NOTE — H&P ADULT - NSHPPHYSICALEXAM_GEN_ALL_CORE
GENERAL: No acute distress, well-developed  HEAD:  Atraumatic, Normocephalic  EYES: EOMI, PERRLA, conjunctiva and sclera clear  NECK: Supple, no lymphadenopathy, no JVD  CHEST/LUNG: CTAB; No wheezes, rales, or rhonchi  HEART: Regular rate and rhythm. Normal S1/S2. No murmurs, rubs, or gallops  ABDOMEN: Soft, non-tender, non-distended; normal bowel sounds, no organomegaly  EXTREMITIES:  2+ peripheral pulses b/l, No clubbing, cyanosis, or edema  NEUROLOGY: A&O x 3, no focal deficits  SKIN: No rashes or lesions GENERAL: No acute distress, well-developed  HEAD:  Atraumatic, Normocephalic  EYES: EOMI, PERRLA, conjunctiva and sclera clear  NECK: Supple, no lymphadenopathy, no JVD  CHEST/LUNG: b/l lungs clear to auscultation,  No wheezes, rales, or rhonchi  HEART: Regular rate and rhythm. Normal S1/S2. No murmurs, rubs, or gallops  ABDOMEN: Soft, non-tender, non-distended; normal bowel sounds, no organomegaly  EXTREMITIES:  2+ peripheral pulses b/l, No clubbing, cyanosis, or edema  NEUROLOGY: A&O x 3, no focal deficits  SKIN: No rashes or lesions

## 2020-02-16 NOTE — H&P ADULT - NSHPSOCIALHISTORY_GEN_ALL_CORE
single, lives with an elderly women, in an apt, works as maintenance personnel at [employer], quit [employer] 4 years ago, denies any alcohol and or illicit drug use.

## 2020-02-16 NOTE — H&P ADULT - ASSESSMENT
64 M with hx of AWMI s/p BMS to LAD in 2013, severe ICM s/p ICD (medtronic), transferred from [place] ED, presents with syncope, found to be hypotensive, and VT, s/p DCCV x 1, STEMI, s/p ASA, plavix load, now on levo, amio and hep gtt.     #neuro   - no active issue at this time  - AOx3    # resp  - tolerating RA, SPO2 %    # card  VT  - c/w amio gtt x 18 hours @0.5 mg/min (2/17 1800)   - infrequent PVC's on tele, cont to monitor   - monitor BMP  - replete lytes   - EP consult for AICD interrogation    STEMI  - s/p ASA, Plavix load  - c/w asa 81 mg daily, plavix 75 mg daily  - c/w hep gtt  - trend CE  - TTE in AM   - likely need ischemic workup with hs of BMS and episode of VT     Hypotension  - hold metoprolol and lisinopril home med  - c/w levophed, wean as tolerated    #  - no active issue at this time   - monitor BMP    #GI  GERD  - c/w pepcid

## 2020-02-16 NOTE — PROCEDURE NOTE - NSPROCDETAILS_GEN_ALL_CORE
location identified, draped/prepped, sterile technique used, needle inserted/introduced/Seldinger technique/ultrasound guidance/sutured in place/positive blood return obtained via catheter/all materials/supplies accounted for at end of procedure

## 2020-02-16 NOTE — PROCEDURE NOTE - NSSITEPREP_SKIN_A_CORE
povidone-iodine ( under 2 weeks of age or 1500 grams)/chlorhexidine/alcohol/povidone iodine (if allergic to chlorhexidine)/Adherence to aseptic technique: hand hygiene prior to donning barriers (gown, gloves), don cap and mask, sterile drape over patient

## 2020-02-16 NOTE — CONSULT NOTE ADULT - ASSESSMENT
64 M with hx of AWMI s/p BMS to LAD in 2013, severe ICM s/p ICD (medtronic) presents with syncope and VT.    - P/w most likely VT given clinical scenario  - s/p DCCV externally His VF zone is set at 200bpm on ICD and there is no VT zone. Will need to address this later  - interrogate device  - Cont amio gtt.   - Now on levo gtt to help increase MAP. Cont pressor support to maintain MAP at least >60.      - EKG is c/w with aw injury pattern though this is similar to his old ekg except leticia little worse  - Will likely need ischemic eval given VT and BMS  - Will reload with plavix  - cont asa    - check labs  - check cxr  - transfer emergently to [place] for further evaluation.     Patient at high risk for decompensation given the above comorbidities and active medical issues. Critically ill. I have personally spent >35 minutes  of critical care time in examining patient, reviewing chart, discussing care/management with patient/family, and  ICU team.

## 2020-02-16 NOTE — ED ADULT NURSE NOTE - OBJECTIVE STATEMENT
Pt brought to bed 16a from triage with the c/o dizziness, nausea, vomiting, diaphoretic feeling x 1 hours. Pt was alert and oriented but very diaphoretic and telling me he has not been feeling well for the last 3 days. Upon obtaining IV access and EKG pt became unresponsive while MD was at the bedside. Pt brought to bed 19a. Pt given amiodarone 150mg IVP with no resolution. Pt HR in 170's. Pt was then shock as per Dr. Mcrae and Lucia's orders. Pt was shocked @ 200 Joules and returned in NS. Pt was hypotensive and placed on Levo drip and tolerated well. Pt also was placed on a Amio drip and heparin bolus given and pt placed on Heparin drip and 900 units per hour. Pt now in the process of being transferred to [facility] CCU. Verbal report given to IZABELLA Randle and transfer  Scott. Nursing care ongoing and safety maintained. Pt alert and oriented and able to move all extremity independently.

## 2020-02-16 NOTE — H&P ADULT - NSHPREVIEWOFSYSTEMS_GEN_ALL_CORE
CONSTITUTIONAL: No weakness, fevers or chills  EYES/ENT: No visual changes;  No vertigo or throat pain   NECK: No pain or stiffness  RESPIRATORY: No cough, wheezing, hemoptysis; No shortness of breath  CARDIOVASCULAR: No chest pain or palpitations  GASTROINTESTINAL: No abdominal or epigastric pain. No nausea, vomiting, or hematemesis; No diarrhea or constipation. No melena or hematochezia.  GENITOURINARY: No dysuria, frequency or hematuria  NEUROLOGICAL: No numbness or weakness  SKIN: No itching, rashes CONSTITUTIONAL: No weakness, fevers or chills  EYES/ENT: No visual changes;  No vertigo or throat pain   NECK: No pain or stiffness  RESPIRATORY: No cough, wheezing, hemoptysis; No shortness of breath  CARDIOVASCULAR: c/o chest tightness, discomfort   GASTROINTESTINAL: No abdominal or epigastric pain. No nausea, vomiting, or hematemesis; No diarrhea or constipation. No melena or hematochezia.  GENITOURINARY: No dysuria, frequency or hematuria  NEUROLOGICAL: No numbness or weakness  SKIN: No itching, rashes

## 2020-02-16 NOTE — H&P ADULT - NSICDXPASTMEDICALHX_GEN_ALL_CORE_FT
PAST MEDICAL HISTORY:  HTN (hypertension)     Hyperlipidemia     MI (myocardial infarction)     Stented coronary artery

## 2020-02-16 NOTE — ED ADULT NURSE NOTE - NSIMPLEMENTINTERV_GEN_ALL_ED
Implemented All Fall Risk Interventions:  [place] to call system. Call bell, personal items and telephone within reach. Instruct patient to call for assistance. Room bathroom lighting operational. Non-slip footwear when patient is off stretcher. Physically safe environment: no spills, clutter or unnecessary equipment. Stretcher in lowest position, wheels locked, appropriate side rails in place. Provide visual cue, wrist band, yellow gown, etc. Monitor gait and stability. Monitor for mental status changes and reorient to person, place, and time. Review medications for side effects contributing to fall risk. Reinforce activity limits and safety measures with patient and family.

## 2020-02-16 NOTE — PROCEDURE NOTE - NSSITEPREP_SKIN_A_CORE
povidone iodine (if allergic to chlorhexidine)/Adherence to aseptic technique: hand hygiene prior to donning barriers (gown, gloves), don cap and mask, sterile drape over patient/chlorhexidine/povidone-iodine ( under 2 weeks of age or 1500 grams)

## 2020-02-16 NOTE — H&P ADULT - HISTORY OF PRESENT ILLNESS
64 male drove to ER c/o near syncope. 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 64 male with PMHx of MI (2013) w/ DESx1, presented to [place] ED, with c/o Chest tightness with dizziness that started around 3pm this afternoon while at work,     he stated that drove to ER c/o near syncope. 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 64 male with PMHx of MI (2013) w/ DESx1, AICD, HTN, HLD, presented to [place] ED, with c/o dizziness, felt like blacking out, that started around 3pm this afternoon while at work, felt better after sitting for 1 hour.  He finished his shift at 1600 he felt chest tightness that lasted for 5 mins, he then drove home at around 1700, where he endorsed persistent chest discomfort that prompt him to drive to [place] ED. In ED triage, his chest felt nauseous, dry heaving, diaphoretic 64 male with PMHx of MI (2013) w/ DESx1, AICD, HTN, HLD, presented to [place] ED, with c/o dizziness, felt like blacking out, that started around 3pm this afternoon while at work, felt better after sitting for 1 hour.  He finished his shift at 1600 he felt chest tightness that lasted for 5 mins, he then drove home at around 1700, where he endorsed persistent chest discomfort that prompt him to drive to [place] ED. In ED triage, he has worsening chest tightnest, felt nauseous, dry heaving, diaphoretic, found to be hypotensive SBP 50's-70's was started on levo gtt, with VT 's, He was given amio 150mg x 1, s/p externally defib with 200J x 1 and returned to SR. EKG post DCCV shows SR with ST elevation in leads V1-V4, he was loaded with  mg, plavix 300 mg, heparin gtt and amiodarone gtt was started. He was transferred to [facility] for further management.  In CCU: he was continued on levophed gtt for pressor support, amiodarone gtt 1 mg /min for VT, and hep gtt. Right radial A line and RIJ TLC was inserted, confirmed with CXR for placement. EKG with ST elevations in leads V1-V4, Trop T: 413. NSR 60's- 70's. Denies any chest discomfort, dizziness, n/v. 64 male with PMHx of MI (2013) w/ DESx1, AICD, HTN, HLD, presented to [place] ED, with c/o dizziness, felt like blacking out, that started around 3pm this afternoon while at work, felt better after sitting for 1 hour.  He finished his shift at 1600 he felt chest tightness that lasted for 5 mins, he then drove home at around 1700, where he endorsed persistent chest discomfort that prompt him to drive to [place] ED. In ED triage, he has worsening chest tightnest, felt nauseous, dry heaving, diaphoretic, found to be hypotensive SBP 50's-70's was started on levo gtt, with VT 's, He was given amio 150mg x 1, s/p externally defib with 200J x 1 and returned to SR. EKG post DCCV shows SR with ST elevation in leads V1-V4, he was loaded with  mg, plavix 300 mg, heparin gtt and amiodarone gtt was started. He was transferred to [facility] for further management.  In CCU: he was continued on levophed gtt for pressor support, SBP: 100's MAP: 60's, amiodarone gtt 1 mg /min for VT, and hep gtt. Right radial A line and RIJ TLC was inserted, confirmed with CXR for placement. EKG with ST elevations in leads V1-V4, Trop T: 413. NSR 60's- 70's. Denies any chest discomfort, dizziness, n/v.

## 2020-02-16 NOTE — ED PROVIDER NOTE - PROGRESS NOTE DETAILS
patient diaphortic, unable to register a blood pressure, passed out for 30 seconds and then awoke, ekg shows wide complex rythym at 189, likley v-tach, patient shocked with 200J and repeat ekg was NSR at 62 concerning for STEMI st elevations V2-V4, called cardiology Dr. Mcrae for transfer called transfer center

## 2020-02-17 LAB
ALBUMIN SERPL ELPH-MCNC: 3 G/DL — LOW (ref 3.3–5)
ALP SERPL-CCNC: 51 U/L — SIGNIFICANT CHANGE UP (ref 40–120)
ALT FLD-CCNC: 27 U/L — SIGNIFICANT CHANGE UP (ref 10–45)
ANION GAP SERPL CALC-SCNC: 9 MMOL/L — SIGNIFICANT CHANGE UP (ref 5–17)
APTT BLD: 54.9 SEC — HIGH (ref 27.5–36.3)
APTT BLD: 58.7 SEC — HIGH (ref 27.5–36.3)
AST SERPL-CCNC: 58 U/L — HIGH (ref 10–40)
BILIRUB SERPL-MCNC: 1.3 MG/DL — HIGH (ref 0.2–1.2)
BUN SERPL-MCNC: 28 MG/DL — HIGH (ref 7–23)
CALCIUM SERPL-MCNC: 8.3 MG/DL — LOW (ref 8.4–10.5)
CHLORIDE SERPL-SCNC: 110 MMOL/L — HIGH (ref 96–108)
CHOLEST SERPL-MCNC: 87 MG/DL — SIGNIFICANT CHANGE UP (ref 10–199)
CK MB BLD-MCNC: 10.2 % — HIGH (ref 0–3.5)
CK MB BLD-MCNC: 11.2 % — HIGH (ref 0–3.5)
CK MB CFR SERPL CALC: 34 NG/ML — HIGH (ref 0–6.7)
CK MB CFR SERPL CALC: 45.9 NG/ML — HIGH (ref 0–6.7)
CK SERPL-CCNC: 334 U/L — HIGH (ref 30–200)
CK SERPL-CCNC: 411 U/L — HIGH (ref 30–200)
CO2 SERPL-SCNC: 21 MMOL/L — LOW (ref 22–31)
CREAT SERPL-MCNC: 1.05 MG/DL — SIGNIFICANT CHANGE UP (ref 0.5–1.3)
GLUCOSE SERPL-MCNC: 140 MG/DL — HIGH (ref 70–99)
HBA1C BLD-MCNC: 5.4 % — SIGNIFICANT CHANGE UP (ref 4–5.6)
HCT VFR BLD CALC: 37.8 % — LOW (ref 39–50)
HCV AB S/CO SERPL IA: 0.11 S/CO — SIGNIFICANT CHANGE UP (ref 0–0.99)
HCV AB SERPL-IMP: SIGNIFICANT CHANGE UP
HDLC SERPL-MCNC: 46 MG/DL — SIGNIFICANT CHANGE UP
HGB BLD-MCNC: 13 G/DL — SIGNIFICANT CHANGE UP (ref 13–17)
LACTATE SERPL-SCNC: 1.5 MMOL/L — SIGNIFICANT CHANGE UP (ref 0.7–2)
LIPID PNL WITH DIRECT LDL SERPL: 35 MG/DL — SIGNIFICANT CHANGE UP
MAGNESIUM SERPL-MCNC: 2.1 MG/DL — SIGNIFICANT CHANGE UP (ref 1.6–2.6)
MCHC RBC-ENTMCNC: 30.2 PG — SIGNIFICANT CHANGE UP (ref 27–34)
MCHC RBC-ENTMCNC: 34.4 GM/DL — SIGNIFICANT CHANGE UP (ref 32–36)
MCV RBC AUTO: 87.7 FL — SIGNIFICANT CHANGE UP (ref 80–100)
NRBC # BLD: 0 /100 WBCS — SIGNIFICANT CHANGE UP (ref 0–0)
PHOSPHATE SERPL-MCNC: 2.8 MG/DL — SIGNIFICANT CHANGE UP (ref 2.5–4.5)
PLATELET # BLD AUTO: 121 K/UL — LOW (ref 150–400)
POTASSIUM SERPL-MCNC: 3.9 MMOL/L — SIGNIFICANT CHANGE UP (ref 3.5–5.3)
POTASSIUM SERPL-SCNC: 3.9 MMOL/L — SIGNIFICANT CHANGE UP (ref 3.5–5.3)
PROT SERPL-MCNC: 5.4 G/DL — LOW (ref 6–8.3)
RBC # BLD: 4.31 M/UL — SIGNIFICANT CHANGE UP (ref 4.2–5.8)
RBC # FLD: 13.9 % — SIGNIFICANT CHANGE UP (ref 10.3–14.5)
SODIUM SERPL-SCNC: 140 MMOL/L — SIGNIFICANT CHANGE UP (ref 135–145)
TOTAL CHOLESTEROL/HDL RATIO MEASUREMENT: 1.9 RATIO — LOW (ref 3.4–9.6)
TRIGL SERPL-MCNC: 33 MG/DL — SIGNIFICANT CHANGE UP (ref 10–149)
TROPONIN T, HIGH SENSITIVITY RESULT: 814 NG/L — HIGH (ref 0–51)
TROPONIN T, HIGH SENSITIVITY RESULT: 831 NG/L — HIGH (ref 0–51)
TSH SERPL-MCNC: 1.82 UIU/ML — SIGNIFICANT CHANGE UP (ref 0.27–4.2)
WBC # BLD: 7.27 K/UL — SIGNIFICANT CHANGE UP (ref 3.8–10.5)
WBC # FLD AUTO: 7.27 K/UL — SIGNIFICANT CHANGE UP (ref 3.8–10.5)

## 2020-02-17 PROCEDURE — 99291 CRITICAL CARE FIRST HOUR: CPT

## 2020-02-17 PROCEDURE — 93010 ELECTROCARDIOGRAM REPORT: CPT

## 2020-02-17 PROCEDURE — 93306 TTE W/DOPPLER COMPLETE: CPT | Mod: 26

## 2020-02-17 PROCEDURE — 99222 1ST HOSP IP/OBS MODERATE 55: CPT

## 2020-02-17 RX ORDER — ASPIRIN/CALCIUM CARB/MAGNESIUM 324 MG
81 TABLET ORAL DAILY
Refills: 0 | Status: DISCONTINUED | OUTPATIENT
Start: 2020-02-17 | End: 2020-02-17

## 2020-02-17 RX ORDER — FAMOTIDINE 10 MG/ML
20 INJECTION INTRAVENOUS DAILY
Refills: 0 | Status: DISCONTINUED | OUTPATIENT
Start: 2020-02-17 | End: 2020-02-24

## 2020-02-17 RX ORDER — POTASSIUM CHLORIDE 20 MEQ
20 PACKET (EA) ORAL ONCE
Refills: 0 | Status: COMPLETED | OUTPATIENT
Start: 2020-02-17 | End: 2020-02-17

## 2020-02-17 RX ORDER — ACETAMINOPHEN 500 MG
650 TABLET ORAL ONCE
Refills: 0 | Status: COMPLETED | OUTPATIENT
Start: 2020-02-17 | End: 2020-02-17

## 2020-02-17 RX ORDER — AMIODARONE HYDROCHLORIDE 400 MG/1
0.5 TABLET ORAL
Qty: 900 | Refills: 0 | Status: DISCONTINUED | OUTPATIENT
Start: 2020-02-17 | End: 2020-02-17

## 2020-02-17 RX ORDER — SODIUM CHLORIDE 9 MG/ML
250 INJECTION INTRAMUSCULAR; INTRAVENOUS; SUBCUTANEOUS ONCE
Refills: 0 | Status: COMPLETED | OUTPATIENT
Start: 2020-02-17 | End: 2020-02-17

## 2020-02-17 RX ADMIN — Medication 81 MILLIGRAM(S): at 11:29

## 2020-02-17 RX ADMIN — SODIUM CHLORIDE 250 MILLILITER(S): 9 INJECTION INTRAMUSCULAR; INTRAVENOUS; SUBCUTANEOUS at 02:15

## 2020-02-17 RX ADMIN — Medication 20 MILLIEQUIVALENT(S): at 06:42

## 2020-02-17 RX ADMIN — ATORVASTATIN CALCIUM 80 MILLIGRAM(S): 80 TABLET, FILM COATED ORAL at 21:09

## 2020-02-17 RX ADMIN — AMIODARONE HYDROCHLORIDE 16.67 MG/MIN: 400 TABLET ORAL at 00:15

## 2020-02-17 RX ADMIN — HEPARIN SODIUM 700 UNIT(S)/HR: 5000 INJECTION INTRAVENOUS; SUBCUTANEOUS at 14:10

## 2020-02-17 RX ADMIN — Medication 650 MILLIGRAM(S): at 06:40

## 2020-02-17 RX ADMIN — FAMOTIDINE 20 MILLIGRAM(S): 10 INJECTION INTRAVENOUS at 11:29

## 2020-02-17 RX ADMIN — CLOPIDOGREL BISULFATE 75 MILLIGRAM(S): 75 TABLET, FILM COATED ORAL at 11:29

## 2020-02-17 RX ADMIN — HEPARIN SODIUM 700 UNIT(S)/HR: 5000 INJECTION INTRAVENOUS; SUBCUTANEOUS at 06:39

## 2020-02-17 RX ADMIN — Medication 650 MILLIGRAM(S): at 07:10

## 2020-02-17 RX ADMIN — CHLORHEXIDINE GLUCONATE 1 APPLICATION(S): 213 SOLUTION TOPICAL at 05:16

## 2020-02-17 NOTE — PROGRESS NOTE ADULT - SUBJECTIVE AND OBJECTIVE BOX
Omar Mcuqeen, PGY-1  Internal Medicine   [id number]    PATIENT:  TIFFANIE MARKS  [id number]    CHIEF COMPLAINT:  Patient is a 64y old  Male who presents with a chief complaint of chest pain (2020 21:16)      INTERVAL HISTORY/OVERNIGHT EVENTS:      REVIEW OF SYSTEMS:    Constitutional:     [ ] negative [ ] fevers [ ] chills [ ] weight loss [ ] weight gain  HEENT:                  [ ] negative [ ] dry eyes [ ] eye irritation [ ] postnasal drip [ ] nasal congestion  CV:                         [ ] negative  [ ] chest pain [ ] orthopnea [ ] palpitations [ ] murmur  Resp:                     [ ] negative [ ] cough [ ] shortness of breath [ ] dyspnea [ ] wheezing [ ] sputum [ ] hemoptysis  GI:                          [ ] negative [ ] nausea [ ] vomiting [ ] diarrhea [ ] constipation [ ] abd pain [ ] dysphagia   :                        [ ] negative [ ] dysuria [ ] nocturia [ ] hematuria [ ] increased urinary frequency  Musculoskeletal: [ ] negative [ ] back pain [ ] myalgias [ ] arthralgias [ ] fracture  Skin:                       [ ] negative [ ] rash [ ] itch  Neurological:        [ ] negative [ ] headache [ ] dizziness [ ] syncope [ ] weakness [ ] numbness  Psychiatric:           [ ] negative [ ] anxiety [ ] depression  Endocrine:            [ ] negative [ ] diabetes [ ] thyroid problem  Heme/Lymph:      [ ] negative [ ] anemia [ ] bleeding problem  Allergic/Immune: [ ] negative [ ] itchy eyes [ ] nasal discharge [ ] hives [ ] angioedema    [ ] All other systems negative  [ ] Unable to assess ROS because ________.    MEDICATIONS:  MEDICATIONS  (STANDING):  aMIOdarone Infusion 0.5 mG/Min (16.667 mL/Hr) IV Continuous <Continuous>  aspirin enteric coated 81 milliGRAM(s) Oral daily  atorvastatin 80 milliGRAM(s) Oral at bedtime  chlorhexidine 2% Cloths 1 Application(s) Topical <User Schedule>  clopidogrel Tablet 75 milliGRAM(s) Oral daily  famotidine    Tablet 20 milliGRAM(s) Oral daily  heparin  Infusion. 700 Unit(s)/Hr (7 mL/Hr) IV Continuous <Continuous>  norepinephrine Infusion 0.06 MICROgram(s)/kG/Min (7.942 mL/Hr) IV Continuous <Continuous>  sodium chloride 0.9%. 1000 milliLiter(s) (10 mL/Hr) IV Continuous <Continuous>    MEDICATIONS  (PRN):  heparin  Injectable 4000 Unit(s) IV Push every 6 hours PRN For aPTT less than 40      ALLERGIES:  Allergies    No Known Allergies    Intolerances        OBJECTIVE:  ICU Vital Signs Last 24 Hrs  T(C): 36.3 (2020 05:00), Max: 36.4 (2020 19:45)  T(F): 97.4 (2020 05:00), Max: 97.6 (2020 19:45)  HR: 52 (2020 06:30) (50 - 170)  BP: 97/61 (2020 20:30) (57/35 - 114/65)  BP(mean): 73 (2020 20:30) (73 - 79)  ABP: 116/52 (2020 06:30) (82/64 - 120/68)  ABP(mean): 72 (2020 06:30) (42 - 86)  RR: 15 (2020 06:30) (11 - 23)  SpO2: 99% (2020 06:30) (70% - 100%)      Adult Advanced Hemodynamics Last 24 Hrs  CVP(mm Hg): 1 (2020 06:30) (1 - 7)  CVP(cm H2O): --  CO: --  CI: --  PA: --  PA(mean): --  PCWP: --  SVR: --  SVRI: --  PVR: --  PVRI: --  CAPILLARY BLOOD GLUCOSE      POCT Blood Glucose.: 85 mg/dL (2020 17:41)    CAPILLARY BLOOD GLUCOSE      POCT Blood Glucose.: 85 mg/dL (2020 17:41)    I&O's Summary    2020 07:01  -  2020 07:00  --------------------------------------------------------  IN: 1242 mL / OUT: 200 mL / NET: 1042 mL      Daily Height in cm: 165.1 (2020 19:45)    Daily Weight in k.1 (2020 05:00)    PHYSICAL EXAMINATION:  General: WN/WD NAD  HEENT: PERRLA, EOMI, moist mucous membranes  Neurology: A&Ox3, nonfocal, MARTIN x 4  Respiratory: CTA B/L, normal respiratory effort, no wheezes, crackles, rales  CV: RRR, S1S2, no murmurs, rubs or gallops  Abdominal: Soft, NT, ND +BS, Last BM  Extremities: No edema, + peripheral pulses  Incisions:   Tubes:    LABS:                          13.0   7.27  )-----------( 121      ( 2020 05:02 )             37.8         140  |  110<H>  |  28<H>  ----------------------------<  140<H>  3.9   |  21<L>  |  1.05    Ca    8.3<L>      2020 05:02  Phos  2.8       Mg     2.1         TPro  5.4<L>  /  Alb  3.0<L>  /  TBili  1.3<H>  /  DBili  x   /  AST  58<H>  /  ALT  27  /  AlkPhos  51      LIVER FUNCTIONS - ( 2020 05:02 )  Alb: 3.0 g/dL / Pro: 5.4 g/dL / ALK PHOS: 51 U/L / ALT: 27 U/L / AST: 58 U/L / GGT: x           PT/INR - ( 2020 21:23 )   PT: 13.5 sec;   INR: 1.17 ratio         PTT - ( 2020 05:02 )  PTT:58.7 sec  CKMB Units: 45.9 ng/mL ( @ 05:02)  Creatine Kinase, Serum: 411 U/L ( @ 05:02)  CKMB Units: 36.8 ng/mL ( @ 21:23)  Creatine Kinase, Serum: 397 U/L ( @ 21:23)  CKMB Units: 5.6 ng/mL ( @ 18:09)    CARDIAC MARKERS ( 2020 05:02 )  x     / x     / 411 U/L / x     / 45.9 ng/mL  CARDIAC MARKERS ( 2020 21:23 )  x     / x     / 397 U/L / x     / 36.8 ng/mL  CARDIAC MARKERS ( 2020 18:09 )  .070 ng/mL / x     / x     / x     / 5.6 ng/mL          TELEMETRY:     EKG:     IMAGING: Omar Mcqueen, PGY-1  Internal Medicine   [phone]    PATIENT:  TIFFANIE MARKS  [id number]    CHIEF COMPLAINT:  Patient is a 64y old  Male who presents with a chief complaint of chest pain (2020 21:16)      INTERVAL HISTORY/OVERNIGHT EVENTS:  Currently feeling better. No palpitations. Denies having any chest pain, shortness of breath, abdominal pain, nausea, vomiting or diarrhea.       REVIEW OF SYSTEMS:    Constitutional:     [X ] negative [ ] fevers [ ] chills [ ] weight loss [ ] weight gain  HEENT:                  [X ] negative [ ] dry eyes [ ] eye irritation [ ] postnasal drip [ ] nasal congestion  CV:                         [X ] negative  [ ] chest pain [ ] orthopnea [ ] palpitations [ ] murmur  Resp:                     [X ] negative [ ] cough [ ] shortness of breath [ ] dyspnea [ ] wheezing [ ] sputum [ ] hemoptysis  GI:                          [X negative [ ] nausea [ ] vomiting [ ] diarrhea [ ] constipation [ ] abd pain [ ] dysphagia   :                        [X ] negative [ ] dysuria [ ] nocturia [ ] hematuria [ ] increased urinary frequency  Musculoskeletal: [X ] negative [ ] back pain [ ] myalgias [ ] arthralgias [ ] fracture  Skin:                       [X ] negative [ ] rash [ ] itch  Neurological:        [X ] negative [ ] headache [ ] dizziness [ ] syncope [ ] weakness [ ] numbness  Psychiatric:           [ X] negative [ ] anxiety [ ] depression  Endocrine:            [ ] negative [ ] diabetes [ ] thyroid problem  Heme/Lymph:      [ ] negative [ ] anemia [ ] bleeding problem  Allergic/Immune: [ ] negative [ ] itchy eyes [ ] nasal discharge [ ] hives [ ] angioedema    [ ] All other systems negative  [ ] Unable to assess ROS because ________.    MEDICATIONS:  MEDICATIONS  (STANDING):  aMIOdarone Infusion 0.5 mG/Min (16.667 mL/Hr) IV Continuous <Continuous>  aspirin enteric coated 81 milliGRAM(s) Oral daily  atorvastatin 80 milliGRAM(s) Oral at bedtime  chlorhexidine 2% Cloths 1 Application(s) Topical <User Schedule>  clopidogrel Tablet 75 milliGRAM(s) Oral daily  famotidine    Tablet 20 milliGRAM(s) Oral daily  heparin  Infusion. 700 Unit(s)/Hr (7 mL/Hr) IV Continuous <Continuous>  norepinephrine Infusion 0.06 MICROgram(s)/kG/Min (7.942 mL/Hr) IV Continuous <Continuous>  sodium chloride 0.9%. 1000 milliLiter(s) (10 mL/Hr) IV Continuous <Continuous>    MEDICATIONS  (PRN):  heparin  Injectable 4000 Unit(s) IV Push every 6 hours PRN For aPTT less than 40      ALLERGIES:  Allergies    No Known Allergies    Intolerances        OBJECTIVE:  ICU Vital Signs Last 24 Hrs  T(C): 36.3 (2020 05:00), Max: 36.4 (2020 19:45)  T(F): 97.4 (2020 05:00), Max: 97.6 (2020 19:45)  HR: 52 (2020 06:30) (50 - 170)  BP: 97/61 (2020 20:30) (57/35 - 114/65)  BP(mean): 73 (2020 20:30) (73 - 79)  ABP: 116/52 (2020 06:30) (82/64 - 120/68)  ABP(mean): 72 (2020 06:30) (42 - 86)  RR: 15 (2020 06:30) (11 - 23)  SpO2: 99% (2020 06:30) (70% - 100%)      Adult Advanced Hemodynamics Last 24 Hrs  CVP(mm Hg): 1 (2020 06:30) (1 - 7)  CVP(cm H2O): --  CO: --  CI: --  PA: --  PA(mean): --  PCWP: --  SVR: --  SVRI: --  PVR: --  PVRI: --  CAPILLARY BLOOD GLUCOSE      POCT Blood Glucose.: 85 mg/dL (2020 17:41)    CAPILLARY BLOOD GLUCOSE      POCT Blood Glucose.: 85 mg/dL (2020 17:41)    I&O's Summary    2020 07:01  -  2020 07:00  --------------------------------------------------------  IN: 1242 mL / OUT: 200 mL / NET: 1042 mL      Daily Height in cm: 165.1 (2020 19:45)    Daily Weight in k.1 (2020 05:00)    PHYSICAL EXAMINATION:  General: WN/WD NAD  HEENT: PERRLA, EOMI, moist mucous membranes, has a left IJ   Neurology: A&Ox3, nonfocal, MARTIN x 4  Respiratory: CTA B/L, normal respiratory effort, no wheezes, crackles, rales  CV: RRR, S1S2, no murmurs, rubs or gallops  Abdominal: Soft, NT, ND +BS, Last BM  Extremities: No edema, + peripheral pulses  Incisions:   Tubes:    LABS:                          13.0   7.27  )-----------( 121      ( 2020 05:02 )             37.8         140  |  110<H>  |  28<H>  ----------------------------<  140<H>  3.9   |  21<L>  |  1.05    Ca    8.3<L>      2020 05:02  Phos  2.8       Mg     2.1         TPro  5.4<L>  /  Alb  3.0<L>  /  TBili  1.3<H>  /  DBili  x   /  AST  58<H>  /  ALT  27  /  AlkPhos  51      LIVER FUNCTIONS - ( 2020 05:02 )  Alb: 3.0 g/dL / Pro: 5.4 g/dL / ALK PHOS: 51 U/L / ALT: 27 U/L / AST: 58 U/L / GGT: x           PT/INR - ( 2020 21:23 )   PT: 13.5 sec;   INR: 1.17 ratio         PTT - ( 2020 05:02 )  PTT:58.7 sec  CKMB Units: 45.9 ng/mL ( @ 05:02)  Creatine Kinase, Serum: 411 U/L ( @ 05:02)  CKMB Units: 36.8 ng/mL ( @ 21:23)  Creatine Kinase, Serum: 397 U/L ( @ 21:23)  CKMB Units: 5.6 ng/mL ( @ 18:09)    CARDIAC MARKERS ( 2020 05:02 )  x     / x     / 411 U/L / x     / 45.9 ng/mL  CARDIAC MARKERS ( 2020 21:23 )  x     / x     / 397 U/L / x     / 36.8 ng/mL  CARDIAC MARKERS ( 2020 18:09 )  .070 ng/mL / x     / x     / x     / 5.6 ng/mL          TELEMETRY:     EKG:     IMAGING: Omar Mcqueen, PGY-1  Internal Medicine   [phone]    PATIENT:  TIFFANIE MARKS  [id number]    CHIEF COMPLAINT:  Patient is a 64y old  Male who presents with a chief complaint of chest pain (2020 21:16)      INTERVAL HISTORY/OVERNIGHT EVENTS:  Currently feeling better. CVP 1-2 given 250 cc of 1 hour. No palpitations. Denies having any chest pain, shortness of breath, abdominal pain, nausea, vomiting or diarrhea.       REVIEW OF SYSTEMS:    Constitutional:     [X ] negative [ ] fevers [ ] chills [ ] weight loss [ ] weight gain  HEENT:                  [X ] negative [ ] dry eyes [ ] eye irritation [ ] postnasal drip [ ] nasal congestion  CV:                         [X ] negative  [ ] chest pain [ ] orthopnea [ ] palpitations [ ] murmur  Resp:                     [X ] negative [ ] cough [ ] shortness of breath [ ] dyspnea [ ] wheezing [ ] sputum [ ] hemoptysis  GI:                          [X negative [ ] nausea [ ] vomiting [ ] diarrhea [ ] constipation [ ] abd pain [ ] dysphagia   :                        [X ] negative [ ] dysuria [ ] nocturia [ ] hematuria [ ] increased urinary frequency  Musculoskeletal: [X ] negative [ ] back pain [ ] myalgias [ ] arthralgias [ ] fracture  Skin:                       [X ] negative [ ] rash [ ] itch  Neurological:        [X ] negative [ ] headache [ ] dizziness [ ] syncope [ ] weakness [ ] numbness  Psychiatric:           [ X] negative [ ] anxiety [ ] depression  Endocrine:            [ ] negative [ ] diabetes [ ] thyroid problem  Heme/Lymph:      [ ] negative [ ] anemia [ ] bleeding problem  Allergic/Immune: [ ] negative [ ] itchy eyes [ ] nasal discharge [ ] hives [ ] angioedema    [ ] All other systems negative  [ ] Unable to assess ROS because ________.    MEDICATIONS:  MEDICATIONS  (STANDING):  aMIOdarone Infusion 0.5 mG/Min (16.667 mL/Hr) IV Continuous <Continuous>  aspirin enteric coated 81 milliGRAM(s) Oral daily  atorvastatin 80 milliGRAM(s) Oral at bedtime  chlorhexidine 2% Cloths 1 Application(s) Topical <User Schedule>  clopidogrel Tablet 75 milliGRAM(s) Oral daily  famotidine    Tablet 20 milliGRAM(s) Oral daily  heparin  Infusion. 700 Unit(s)/Hr (7 mL/Hr) IV Continuous <Continuous>  norepinephrine Infusion 0.06 MICROgram(s)/kG/Min (7.942 mL/Hr) IV Continuous <Continuous>  sodium chloride 0.9%. 1000 milliLiter(s) (10 mL/Hr) IV Continuous <Continuous>    MEDICATIONS  (PRN):  heparin  Injectable 4000 Unit(s) IV Push every 6 hours PRN For aPTT less than 40      ALLERGIES:  Allergies    No Known Allergies    Intolerances        OBJECTIVE:  ICU Vital Signs Last 24 Hrs  T(C): 36.3 (2020 05:00), Max: 36.4 (2020 19:45)  T(F): 97.4 (2020 05:00), Max: 97.6 (2020 19:45)  HR: 52 (2020 06:30) (50 - 170)  BP: 97/61 (2020 20:30) (57/35 - 114/65)  BP(mean): 73 (2020 20:30) (73 - 79)  ABP: 116/52 (2020 06:30) (82/64 - 120/68)  ABP(mean): 72 (2020 06:30) (42 - 86)  RR: 15 (2020 06:30) (11 - 23)  SpO2: 99% (2020 06:30) (70% - 100%)      Adult Advanced Hemodynamics Last 24 Hrs  CVP(mm Hg): 1 (2020 06:30) (1 - 7)  CVP(cm H2O): --  CO: --  CI: --  PA: --  PA(mean): --  PCWP: --  SVR: --  SVRI: --  PVR: --  PVRI: --  CAPILLARY BLOOD GLUCOSE      POCT Blood Glucose.: 85 mg/dL (2020 17:41)    CAPILLARY BLOOD GLUCOSE      POCT Blood Glucose.: 85 mg/dL (2020 17:41)    I&O's Summary    2020 07:01  -  2020 07:00  --------------------------------------------------------  IN: 1242 mL / OUT: 200 mL / NET: 1042 mL      Daily Height in cm: 165.1 (2020 19:45)    Daily Weight in k.1 (2020 05:00)    PHYSICAL EXAMINATION:  General: WN/WD NAD  HEENT: PERRLA, EOMI, moist mucous membranes, has a right IJ   Neurology: A&Ox3, nonfocal, MARTIN x 4  Respiratory: CTA B/L, normal respiratory effort, no wheezes, crackles, rales  CV: RRR, S1S2, no murmurs, rubs or gallops  Abdominal: Soft, NT, ND +BS, Last BM  Extremities: No edema, + peripheral pulses    LABS:                          13.0   7.27  )-----------( 121      ( 2020 05:02 )             37.8         140  |  110<H>  |  28<H>  ----------------------------<  140<H>  3.9   |  21<L>  |  1.05    Ca    8.3<L>      2020 05:02  Phos  2.8       Mg     2.1         TPro  5.4<L>  /  Alb  3.0<L>  /  TBili  1.3<H>  /  DBili  x   /  AST  58<H>  /  ALT  27  /  AlkPhos  51  -17    LIVER FUNCTIONS - ( 2020 05:02 )  Alb: 3.0 g/dL / Pro: 5.4 g/dL / ALK PHOS: 51 U/L / ALT: 27 U/L / AST: 58 U/L / GGT: x           PT/INR - ( 2020 21:23 )   PT: 13.5 sec;   INR: 1.17 ratio         PTT - ( 2020 05:02 )  PTT:58.7 sec  CKMB Units: 45.9 ng/mL ( @ 05:02)  Creatine Kinase, Serum: 411 U/L ( @ 05:02)  CKMB Units: 36.8 ng/mL ( @ 21:23)  Creatine Kinase, Serum: 397 U/L ( @ 21:23)  CKMB Units: 5.6 ng/mL ( @ 18:09)    CARDIAC MARKERS ( 2020 05:02 )  x     / x     / 411 U/L / x     / 45.9 ng/mL  CARDIAC MARKERS ( 2020 21:23 )  x     / x     / 397 U/L / x     / 36.8 ng/mL  CARDIAC MARKERS ( 2020 18:09 )  .070 ng/mL / x     / x     / x     / 5.6 ng/mL          TELEMETRY: Had few PVCs and episode of 2 beats of VT     EKG: Sinus bradycardia with incomplete RBBB Omar Mcqueen, PGY-2  Internal Medicine   [phone]    PATIENT:  TIFFANIE MARKS  [id number]    CHIEF COMPLAINT:  Patient is a 64y old  Male who presents with a chief complaint of chest pain (2020 21:16)      INTERVAL HISTORY/OVERNIGHT EVENTS:  Currently feeling better. CVP 1-2 given 250 cc of 1 hour. No palpitations. Denies having any chest pain, shortness of breath, abdominal pain, nausea, vomiting or diarrhea.       REVIEW OF SYSTEMS:    Constitutional:     [X ] negative [ ] fevers [ ] chills [ ] weight loss [ ] weight gain  HEENT:                  [X ] negative [ ] dry eyes [ ] eye irritation [ ] postnasal drip [ ] nasal congestion  CV:                         [X ] negative  [ ] chest pain [ ] orthopnea [ ] palpitations [ ] murmur  Resp:                     [X ] negative [ ] cough [ ] shortness of breath [ ] dyspnea [ ] wheezing [ ] sputum [ ] hemoptysis  GI:                          [X negative [ ] nausea [ ] vomiting [ ] diarrhea [ ] constipation [ ] abd pain [ ] dysphagia   :                        [X ] negative [ ] dysuria [ ] nocturia [ ] hematuria [ ] increased urinary frequency  Musculoskeletal: [X ] negative [ ] back pain [ ] myalgias [ ] arthralgias [ ] fracture  Skin:                       [X ] negative [ ] rash [ ] itch  Neurological:        [X ] negative [ ] headache [ ] dizziness [ ] syncope [ ] weakness [ ] numbness  Psychiatric:           [ X] negative [ ] anxiety [ ] depression  Endocrine:            [ ] negative [ ] diabetes [ ] thyroid problem  Heme/Lymph:      [ ] negative [ ] anemia [ ] bleeding problem  Allergic/Immune: [ ] negative [ ] itchy eyes [ ] nasal discharge [ ] hives [ ] angioedema    [ ] All other systems negative  [ ] Unable to assess ROS because ________.    MEDICATIONS:  MEDICATIONS  (STANDING):  aMIOdarone Infusion 0.5 mG/Min (16.667 mL/Hr) IV Continuous <Continuous>  aspirin enteric coated 81 milliGRAM(s) Oral daily  atorvastatin 80 milliGRAM(s) Oral at bedtime  chlorhexidine 2% Cloths 1 Application(s) Topical <User Schedule>  clopidogrel Tablet 75 milliGRAM(s) Oral daily  famotidine    Tablet 20 milliGRAM(s) Oral daily  heparin  Infusion. 700 Unit(s)/Hr (7 mL/Hr) IV Continuous <Continuous>  norepinephrine Infusion 0.06 MICROgram(s)/kG/Min (7.942 mL/Hr) IV Continuous <Continuous>  sodium chloride 0.9%. 1000 milliLiter(s) (10 mL/Hr) IV Continuous <Continuous>    MEDICATIONS  (PRN):  heparin  Injectable 4000 Unit(s) IV Push every 6 hours PRN For aPTT less than 40      ALLERGIES:  Allergies    No Known Allergies    Intolerances        OBJECTIVE:  ICU Vital Signs Last 24 Hrs  T(C): 36.3 (2020 05:00), Max: 36.4 (2020 19:45)  T(F): 97.4 (2020 05:00), Max: 97.6 (2020 19:45)  HR: 52 (2020 06:30) (50 - 170)  BP: 97/61 (2020 20:30) (57/35 - 114/65)  BP(mean): 73 (2020 20:30) (73 - 79)  ABP: 116/52 (2020 06:30) (82/64 - 120/68)  ABP(mean): 72 (2020 06:30) (42 - 86)  RR: 15 (2020 06:30) (11 - 23)  SpO2: 99% (2020 06:30) (70% - 100%)      Adult Advanced Hemodynamics Last 24 Hrs  CVP(mm Hg): 1 (2020 06:30) (1 - 7)  CVP(cm H2O): --  CO: --  CI: --  PA: --  PA(mean): --  PCWP: --  SVR: --  SVRI: --  PVR: --  PVRI: --  CAPILLARY BLOOD GLUCOSE      POCT Blood Glucose.: 85 mg/dL (2020 17:41)    CAPILLARY BLOOD GLUCOSE      POCT Blood Glucose.: 85 mg/dL (2020 17:41)    I&O's Summary    2020 07:01  -  2020 07:00  --------------------------------------------------------  IN: 1242 mL / OUT: 200 mL / NET: 1042 mL      Daily Height in cm: 165.1 (2020 19:45)    Daily Weight in k.1 (2020 05:00)    PHYSICAL EXAMINATION:  General: WN/WD NAD  HEENT: PERRLA, EOMI, moist mucous membranes, has a right IJ   Neurology: A&Ox3, nonfocal, MARTIN x 4  Respiratory: CTA B/L, normal respiratory effort, no wheezes, crackles, rales  CV: RRR, S1S2, no murmurs, rubs or gallops  Abdominal: Soft, NT, ND +BS, Last BM  Extremities: No edema, + peripheral pulses    LABS:                          13.0   7.27  )-----------( 121      ( 2020 05:02 )             37.8         140  |  110<H>  |  28<H>  ----------------------------<  140<H>  3.9   |  21<L>  |  1.05    Ca    8.3<L>      2020 05:02  Phos  2.8       Mg     2.1         TPro  5.4<L>  /  Alb  3.0<L>  /  TBili  1.3<H>  /  DBili  x   /  AST  58<H>  /  ALT  27  /  AlkPhos  51  -17    LIVER FUNCTIONS - ( 2020 05:02 )  Alb: 3.0 g/dL / Pro: 5.4 g/dL / ALK PHOS: 51 U/L / ALT: 27 U/L / AST: 58 U/L / GGT: x           PT/INR - ( 2020 21:23 )   PT: 13.5 sec;   INR: 1.17 ratio         PTT - ( 2020 05:02 )  PTT:58.7 sec  CKMB Units: 45.9 ng/mL ( @ 05:02)  Creatine Kinase, Serum: 411 U/L ( @ 05:02)  CKMB Units: 36.8 ng/mL ( @ 21:23)  Creatine Kinase, Serum: 397 U/L ( @ 21:23)  CKMB Units: 5.6 ng/mL ( @ 18:09)    CARDIAC MARKERS ( 2020 05:02 )  x     / x     / 411 U/L / x     / 45.9 ng/mL  CARDIAC MARKERS ( 2020 21:23 )  x     / x     / 397 U/L / x     / 36.8 ng/mL  CARDIAC MARKERS ( 2020 18:09 )  .070 ng/mL / x     / x     / x     / 5.6 ng/mL          TELEMETRY: Had few PVCs and episode of 2 beats of VT     EKG: Sinus bradycardia with incomplete RBBB

## 2020-02-17 NOTE — CONSULT NOTE ADULT - ATTENDING COMMENTS
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

## 2020-02-17 NOTE — PROGRESS NOTE ADULT - SUBJECTIVE AND OBJECTIVE BOX
====================  CCU MIDNIGHT ROUNDS  ====================    TIFFANIE MARKS  [id number]  Patient is a 64y old  Male who presents with a chief complaint of chest pain (17 Feb 2020 08:45)      ====================  SUMMARY:  64 male with PMHx of MI (2013) w/ DESx1, AICD, HTN, HLD, presented to [place] ED, with c/o dizziness, felt like blacking out, that started around 3pm this afternoon while at work, felt better after sitting for 1 hour.  He finished his shift at 1600 he felt chest tightness that lasted for 5 mins, he then drove home at around 1700, where he endorsed persistent chest discomfort that prompt him to drive to [place] ED. In ED triage, he has worsening chest tightnest, felt nauseous, dry heaving, diaphoretic, found to be hypotensive SBP 50's-70's was started on levo gtt, with VT 's, He was given amio 150mg x 1, s/p externally defib with 200J x 1 and returned to SR. EKG post DCCV shows SR with ST elevation in leads V1-V4, he was loaded with  mg, plavix 300 mg, heparin gtt and amiodarone gtt was started. He was transferred to [facility] for further management.  In CCU: he was continued on levophed gtt for pressor support, SBP: 100's MAP: 60's, amiodarone gtt 1 mg /min for VT, and hep gtt. Right radial A line and RIJ TLC was inserted, confirmed with CXR for placement. EKG with ST elevations in leads V1-V4, Trop T: 413. NSR 60's- 70's. Denies any chest discomfort, dizziness, n/v.    ====================        ====================  NEW EVENTS: No acute events  ====================        ====================  VITALS (Last 12 hrs):  ====================    T(C): 36.4 (02-17-20 @ 16:00), Max: 36.4 (02-17-20 @ 16:00)  T(F): 97.6 (02-17-20 @ 16:00), Max: 97.6 (02-17-20 @ 16:00)  HR: 72 (02-17-20 @ 19:30) (54 - 76)  BP: --  BP(mean): --  ABP: 118/52 (02-17-20 @ 19:30) (98/50 - 122/58)  ABP(mean): 74 (02-17-20 @ 19:30) (64 - 82)  RR: 15 (02-17-20 @ 19:30) (13 - 22)  SpO2: 96% (02-17-20 @ 19:30) (95% - 99%)  Wt(kg): --  CVP(mm Hg): 0 (02-17-20 @ 19:30) (-9 - 7)  CVP(cm H2O): --  CO: --  CI: --  PA: --  PA(mean): --  PCWP: --  SVR: --  PVR: --    I&O's Summary    16 Feb 2020 07:01  -  17 Feb 2020 07:00  --------------------------------------------------------  IN: 1242 mL / OUT: 200 mL / NET: 1042 mL    17 Feb 2020 07:01  -  17 Feb 2020 20:05  --------------------------------------------------------  IN: 480.7 mL / OUT: 750 mL / NET: -269.3 mL            ====================  NEW LABS:  ====================                          13.0   7.27  )-----------( 121      ( 17 Feb 2020 05:02 )             37.8     02-17    140  |  110<H>  |  28<H>  ----------------------------<  140<H>  3.9   |  21<L>  |  1.05    Ca    8.3<L>      17 Feb 2020 05:02  Phos  2.8     02-17  Mg     2.1     02-17    TPro  5.4<L>  /  Alb  3.0<L>  /  TBili  1.3<H>  /  DBili  x   /  AST  58<H>  /  ALT  27  /  AlkPhos  51  02-17    PT/INR - ( 16 Feb 2020 21:23 )   PT: 13.5 sec;   INR: 1.17 ratio         PTT - ( 17 Feb 2020 12:26 )  PTT:54.9 sec  Creatine Kinase, Serum: 334 U/L <H> (02-17-20 @ 12:26)  Creatine Kinase, Serum: 411 U/L <H> (02-17-20 @ 05:02)  Creatine Kinase, Serum: 397 U/L <H> (02-16-20 @ 21:23)    CKMB Units: 34.0 ng/mL (02-17 @ 12:26)  CKMB Units: 45.9 ng/mL (02-17 @ 05:02)  CKMB Units: 36.8 ng/mL (02-16 @ 21:23)          ====================  PLAN:  ====================  # resp  -98% on room air  - no active issues, denies SOB/dyspnea    # card  1) VT  - D/C amio gtt today as per EP- monomorphic VT likely 2/2 scar tissue  - infrequent PVC's on tele, cont to monitor for ectopy.   - AICD Device re-programmed by EP today  - monitor BMP & replete lytes as needed  2) STEMI  - s/p ASA, Plavix load. continue asa 81 mg daily, plavix 75 mg daily, and Lipitor   - c/w hep gtt  - CE peaked  - pending TTE results  - [facility] pending, 2/18.     #GI  - c/w pepcid   - continue diet as tolerated        Emerita Mayorga PA-C  CCU/Cardiology ====================  CCU MIDNIGHT ROUNDS  ====================    TIFFANIE MARKS  [id number]  Patient is a 64y old  Male who presents with a chief complaint of chest pain (17 Feb 2020 08:45)      ====================  SUMMARY:  64 male with PMHx of MI (2013) w/ DESx1, AICD, HTN, HLD, presented to [place] ED, with c/o dizziness, felt like blacking out, that started around 3pm this afternoon while at work, felt better after sitting for 1 hour.  He finished his shift at 1600 he felt chest tightness that lasted for 5 mins, he then drove home at around 1700, where he endorsed persistent chest discomfort that prompt him to drive to [place] ED. In ED triage, he has worsening chest tightnest, felt nauseous, dry heaving, diaphoretic, found to be hypotensive SBP 50's-70's was started on levo gtt, with VT 's, He was given amio 150mg x 1, s/p externally defib with 200J x 1 and returned to SR. EKG post DCCV shows SR with ST elevation in leads V1-V4, he was loaded with  mg, plavix 300 mg, heparin gtt and amiodarone gtt was started. He was transferred to [facility] for further management.  In CCU: he was continued on levophed gtt for pressor support, SBP: 100's MAP: 60's, amiodarone gtt 1 mg /min for VT, and hep gtt. Right radial A line and RIJ TLC was inserted, confirmed with CXR for placement. EKG with ST elevations in leads V1-V4, Trop T: 413. NSR 60's- 70's. Denies any chest discomfort, dizziness, n/v.    ====================        ====================  NEW EVENTS: No acute events  ====================        ====================  VITALS (Last 12 hrs):  ====================    T(C): 36.4 (02-17-20 @ 16:00), Max: 36.4 (02-17-20 @ 16:00)  T(F): 97.6 (02-17-20 @ 16:00), Max: 97.6 (02-17-20 @ 16:00)  HR: 72 (02-17-20 @ 19:30) (54 - 76)  BP: --  BP(mean): --  ABP: 118/52 (02-17-20 @ 19:30) (98/50 - 122/58)  ABP(mean): 74 (02-17-20 @ 19:30) (64 - 82)  RR: 15 (02-17-20 @ 19:30) (13 - 22)  SpO2: 96% (02-17-20 @ 19:30) (95% - 99%)  Wt(kg): --  CVP(mm Hg): 0 (02-17-20 @ 19:30) (-9 - 7)  CVP(cm H2O): --  CO: --  CI: --  PA: --  PA(mean): --  PCWP: --  SVR: --  PVR: --    I&O's Summary    16 Feb 2020 07:01  -  17 Feb 2020 07:00  --------------------------------------------------------  IN: 1242 mL / OUT: 200 mL / NET: 1042 mL    17 Feb 2020 07:01  -  17 Feb 2020 20:05  --------------------------------------------------------  IN: 480.7 mL / OUT: 750 mL / NET: -269.3 mL            ====================  NEW LABS:  ====================                          13.0   7.27  )-----------( 121      ( 17 Feb 2020 05:02 )             37.8     02-17    140  |  110<H>  |  28<H>  ----------------------------<  140<H>  3.9   |  21<L>  |  1.05    Ca    8.3<L>      17 Feb 2020 05:02  Phos  2.8     02-17  Mg     2.1     02-17    TPro  5.4<L>  /  Alb  3.0<L>  /  TBili  1.3<H>  /  DBili  x   /  AST  58<H>  /  ALT  27  /  AlkPhos  51  02-17    PT/INR - ( 16 Feb 2020 21:23 )   PT: 13.5 sec;   INR: 1.17 ratio         PTT - ( 17 Feb 2020 12:26 )  PTT:54.9 sec  Creatine Kinase, Serum: 334 U/L <H> (02-17-20 @ 12:26)  Creatine Kinase, Serum: 411 U/L <H> (02-17-20 @ 05:02)  Creatine Kinase, Serum: 397 U/L <H> (02-16-20 @ 21:23)    CKMB Units: 34.0 ng/mL (02-17 @ 12:26)  CKMB Units: 45.9 ng/mL (02-17 @ 05:02)  CKMB Units: 36.8 ng/mL (02-16 @ 21:23)          ====================  PLAN:  ====================  # resp  -98% on room air  - no active issues, denies SOB/dyspnea    # card  1) VT  - D/C amio gtt today as per EP- monomorphic VT likely 2/2 scar tissue  - infrequent PVC's on tele, cont to monitor for ectopy.   - AICD Device re-programmed by EP today  - monitor BMP & replete lytes as needed  2) STEMI  - s/p ASA, Plavix load. continue asa 81 mg daily, plavix 75 mg daily, and Lipitor   - c/w hep gtt  - CE peaked  - pending TTE results  - [facility] pending, 2/18.     #GI  - c/w pepcid   - continue diet as tolerated    #  - creatinine downtrending, continue to monitor and trend.   - monitor I&Os  - replete lytes as needed        Emerita Mayorga PA-C  CCU/Cardiology

## 2020-02-17 NOTE — CONSULT NOTE ADULT - SUBJECTIVE AND OBJECTIVE BOX
In-House Cardiology Consult Note  ---------------------------------------------    HPI:  64 male with PMHx of MI () w/ DESx1, AICD, HTN, HLD, presented to [place] ED, with c/o dizziness, felt like blacking out, that started around 3pm this afternoon while at work, felt better after sitting for 1 hour.  He finished his shift at 1600 he felt chest tightness that lasted for 5 mins, he then drove home at around 1700, where he endorsed persistent chest discomfort that prompt him to drive to [place] ED. In ED triage, he has worsening chest tightnest, felt nauseous, dry heaving, diaphoretic, found to be hypotensive SBP 50's-70's was started on levo gtt, with VT 's, He was given amio 150mg x 1, s/p externally defib with 200J x 1 and returned to SR. EKG post DCCV shows SR with ST elevation in leads V1-V4, he was loaded with  mg, plavix 300 mg, heparin gtt and amiodarone gtt was started. He was transferred to [facility] for further management.  In CCU: he was continued on levophed gtt for pressor support, SBP: 100's MAP: 60's, amiodarone gtt 1 mg /min for VT, and hep gtt. Right radial A line and RIJ TLC was inserted, confirmed with CXR for placement. EKG with ST elevations in leads V1-V4, Trop T: 413. NSR 60's- 70's. Denies any chest discomfort, dizziness, n/v.     PMHx:   Hyperlipidemia  HTN (hypertension)  Stented coronary artery  MI (myocardial infarction)  NO PERTINENT PAST MEDICAL HISTORY    PSHx:   History of tonsillectomy    Allergies:  No Known Allergies    Current Medications:   aMIOdarone Infusion 0.5 mG/Min IV Continuous <Continuous>  aspirin enteric coated 81 milliGRAM(s) Oral daily  atorvastatin 80 milliGRAM(s) Oral at bedtime  chlorhexidine 2% Cloths 1 Application(s) Topical <User Schedule>  clopidogrel Tablet 75 milliGRAM(s) Oral daily  famotidine    Tablet 20 milliGRAM(s) Oral daily  heparin  Infusion. 700 Unit(s)/Hr IV Continuous <Continuous>  heparin  Injectable 4000 Unit(s) IV Push every 6 hours PRN  norepinephrine Infusion 0.06 MICROgram(s)/kG/Min IV Continuous <Continuous>  sodium chloride 0.9%. 1000 milliLiter(s) IV Continuous <Continuous>    FAMILY HISTORY:  No pertinent family history in first degree relatives    REVIEW OF SYSTEMS:  CONSTITUTIONAL: No weakness, fevers or chills  EYES/ENT: No visual changes;  No dysphagia  NECK: No pain or stiffness  RESPIRATORY: No cough, wheezing, hemoptysis; No shortness of breath  CARDIOVASCULAR: No chest pain or palpitations; No lower extremity edema  GASTROINTESTINAL: No abdominal or epigastric pain. No nausea, vomiting, or hematemesis; No diarrhea or constipation. No melena or hematochezia.  BACK: No back pain  GENITOURINARY: No dysuria, frequency or hematuria  NEUROLOGICAL: No numbness or weakness  SKIN: No itching, burning, rashes, or lesions   All other review of systems is negative unless indicated above.    Physical Exam:  T(F): 97.5 (), Max: 97.6 ()  HR: 56 () (50 - 170)  BP: 97/61 () (57/35 - 114/65)  RR: 14 (-)  SpO2: 99% ()  GENERAL: No acute distress, well-developed  HEAD:  Atraumatic, Normocephalic  ENT: EOMI, PERRLA, conjunctiva and sclera clear, Neck supple, No JVD, moist mucosa  CHEST/LUNG: Clear to auscultation bilaterally; No wheeze, equal breath sounds bilaterally   HEART: Regular rate and rhythm; No murmurs, rubs, or gallops  ABDOMEN: Soft, Nontender, Nondistended; Bowel sounds present  EXTREMITIES:  No clubbing, cyanosis, or edema  PSYCH: Nl behavior, nl affect  NEUROLOGY: AAOx3, non-focal, cranial nerves intact  SKIN: Normal color, No rashes or lesions    CXR: Personally reviewed    Labs: Personally reviewed                        13.0   7.27  )-----------( 121      ( 2020 05:02 )             37.8         140  |  110<H>  |  28<H>  ----------------------------<  140<H>  3.9   |  21<L>  |  1.05    Ca    8.3<L>      2020 05:02  Phos  2.8       Mg     2.1         TPro  5.4<L>  /  Alb  3.0<L>  /  TBili  1.3<H>  /  DBili  x   /  AST  58<H>  /  ALT  27  /  AlkPhos  51      PT/INR - ( 2020 21:23 )   PT: 13.5 sec;   INR: 1.17 ratio         PTT - ( 2020 05:02 )  PTT:58.7 sec    CARDIAC MARKERS ( 2020 05:02 )  831 ng/L / x     / x     / 411 U/L / x     / 45.9 ng/mL  CARDIAC MARKERS ( 2020 21:23 )  413 ng/L / x     / x     / 397 U/L / x     / 36.8 ng/mL  CARDIAC MARKERS ( 2020 18:09 )  x     / .070 ng/mL / x     / x     / x     / 5.6 ng/mL        Serum Pro-Brain Natriuretic Peptide: 1278 pg/mL ( @ 21:23)  Serum Pro-Brain Natriuretic Peptide: 1769 pg/mL ( @ 18:09)    Total Cholesterol: 87  LDL: 35  HDL: 46  T    Hemoglobin A1C, Whole Blood: 5.4 % ( @ 00:39)    Thyroid Stimulating Hormone, Serum: 1.82 uIU/mL ( @ 00:39)  Thyroid Stimulating Hormone, Serum: 5.05 uIU/mL ( @ 18:09)

## 2020-02-17 NOTE — PROGRESS NOTE ADULT - ASSESSMENT
64 M with hx of AWMI s/p BMS to LAD in 2013, severe ICM s/p ICD (medtronic), transferred from [place] ED, presents with syncope, found to be hypotensive, and VT, s/p DCCV x 1, STEMI, s/p ASA, plavix load, now on levo, amio and hep gtt.     #neuro   - no active issue at this time  - AOx3    # resp  - tolerating RA, SPO2 %    # card  VT  - c/w amio gtt x 18 hours @0.5 mg/min (2/17 1800)   - infrequent PVC's on tele, cont to monitor   - monitor BMP  - replete lytes   - EP consult for AICD interrogation    STEMI  - s/p ASA, Plavix load  - c/w asa 81 mg daily, plavix 75 mg daily  - c/w hep gtt  - trend CE  - TTE in AM   - likely need ischemic workup with hs of BMS and episode of VT     Hypotension  - hold metoprolol and lisinopril home med  - c/w levophed, wean as tolerated    #  - no active issue at this time   - monitor BMP    #GI  GERD  - c/w pepcid 64 M with hx of AWMI s/p BMS to LAD in 2013, severe ICM s/p ICD (medtronic), transferred from [place] ED, presents with syncope, found to be hypotensive, and VT, s/p DCCV x 1, STEMI, s/p ASA, plavix load, now on levo, amio and hep gtt.     #neuro   - no active issue at this time  - AOx3    # resp  - tolerating RA, SPO2 %    # card  VT  - c/w amio gtt x 18 hours @0.5 mg/min (2/17 1800)   - infrequent PVC's on tele, cont to monitor   - monitor BMP  - replete lytes   - EP consult for AICD interrogation (Medtronic) for known EF of 25%    STEMI  - s/p ASA, Plavix load  - c/w asa 81 mg daily, plavix 75 mg daily  - c/w hep gtt  - CE uptrending 413 -> 831  - TTE pending  - likely need ischemic workup with hs of BMS and episode of VT     Hypotension  - hold metoprolol and lisinopril home med  - Received 250cc over 1  hour yesterday low CVP there may be rule for gentle IVF   - c/w levophed, wean as tolerated    #  - no active issue at this time   - monitor BMP    #GI  GERD  - c/w pepcid     #Renal - Had a SCr of 1.5 unclear baseline could be hemodynamically mediated in the setting of VT  - Currently SCr is dowtrending  - Will monitor electrolytes    #ID  - Had at one point elevated WBC but now resolved, afebrile  - Otherwise no active issues    #Heme  - Monitor H/H while on Heparin gtt    #Endo  - TSH normal  - A1C 5.4    #DVT PPx  - Heparin gtt 64 M with hx of AWMI s/p BMS to LAD in 2013, severe ICM s/p ICD (medtronic), transferred from [place] ED, presents with syncope, found to be hypotensive, and VT, s/p DCCV x 1, STEMI, s/p ASA, plavix load, now on levo, amio and hep gtt.     #neuro   - no active issue at this time  - AOx3    # resp  - tolerating RA, SPO2 %    # card  VT - Monomorphic appears to be from RV inferior/posterior; has Medtronic AICD   - as per EP d/c amio gtt due to age if have recurrent VT can consider lidocaine gtt  - Will need ischemic w/u possible LHC tomorrow if CAD stable as per EP will consider VT ablation  - Device interrogated and VT zone adjusted to 182BPM with ATP  - infrequent PVC's on tele, cont to monitor   - monitor BMP  - replete lytes     STEMI  - s/p ASA, Plavix load  - c/w asa 81 mg daily, plavix 75 mg daily  - c/w hep gtt  - CE uptrending 413 -> 831 will check Q8H  - TTE pending  - likely need ischemic workup with hx of BMS and episode of VT     Hypotension  - Off levophed gtt this AM MAP > 65  - Holding Lisinopril and Metoprolol    #  - no active issue at this time   - monitor BMP    #GI  GERD  - c/w pepcid     #Renal - Had a SCr of 1.5 unclear baseline could be hemodynamically mediated in the setting of VT  - Currently SCr is dowtrending  - Will monitor electrolytes    #ID  - Had at one point elevated WBC but now resolved, afebrile  - Otherwise no active issues    #Heme  - Monitor H/H while on Heparin gtt    #Endo  - TSH normal  - A1C 5.4    #DVT PPx  - Heparin gtt

## 2020-02-17 NOTE — CONSULT NOTE ADULT - ASSESSMENT
64 M with hx of AWMI s/p BMS to LAD in 2013, severe ICM s/p ICD (medtronic), transferred from [place] ED, presented with syncope, found to be hypotensive, and VT, s/p DCCV x 1, STEMI (V1-V4), s/p ASA, plavix load, now on levo, amio and hep gtt.     -Has Hx ICM with EF 25% as of 2017; patient is euvolemic with normal renal function and negative lactate.  -Continue amiodarone gtt for VT arrest; infrequent episodes of PVCs noted on telemetry.  -ST segments elevated on prior EKG from 2/2019 in V1-V4; current EKG with similar findings, maybe worse elevations noted.  -ACS management per CCU; cardiac enzymes trend q8H and f/u echo in the AM.  -Troponins are trending up. Will likely require LHC.    EPS will continue to follow.    Case discussed with Dr. Nair.    Kay Martínez MD PGY-4  Cardiology Fellow  All Cardiology service information can be found 24/7 on [email or web address], password: [email or web address]  Note is preliminary until signed by the attending. 64 M with hx of AWMI s/p BMS to LAD in 2013, severe ICM s/p ICD (medtronic), transferred from [place] ED, presented with syncope, found to be hypotensive, and VT, s/p DCCV x 1, STEMI (V1-V4), s/p ASA, plavix load, now on levo, amio and hep gtt.     -Has Hx ICM with EF 25% as of 2017; patient is euvolemic with normal renal function and negative lactate.  -DC amiodarone  -ST segments elevated on prior EKG from 2/2019 in V1-V4; current EKG with similar findings, maybe worse elevations noted.  -ACS management per CCU; cardiac enzymes trend q8H and f/u echo in the AM.  -Troponins are trending up. Will likely require LHC.  -Device reprogrammed to have VT zone    EPS will continue to follow.    Case discussed with Dr. Nair.    Kay Martínez MD PGY-4  Cardiology Fellow  All Cardiology service information can be found 24/7 on [email or web address], password: [email or web address]  Note is preliminary until signed by the attending.

## 2020-02-18 LAB
ALBUMIN SERPL ELPH-MCNC: 3 G/DL — LOW (ref 3.3–5)
ALP SERPL-CCNC: 53 U/L — SIGNIFICANT CHANGE UP (ref 40–120)
ALT FLD-CCNC: 24 U/L — SIGNIFICANT CHANGE UP (ref 10–45)
ANION GAP SERPL CALC-SCNC: 10 MMOL/L — SIGNIFICANT CHANGE UP (ref 5–17)
ANION GAP SERPL CALC-SCNC: 11 MMOL/L — SIGNIFICANT CHANGE UP (ref 5–17)
APTT BLD: 66.6 SEC — HIGH (ref 27.5–36.3)
AST SERPL-CCNC: 43 U/L — HIGH (ref 10–40)
BASOPHILS # BLD AUTO: 0.04 K/UL — SIGNIFICANT CHANGE UP (ref 0–0.2)
BASOPHILS NFR BLD AUTO: 0.6 % — SIGNIFICANT CHANGE UP (ref 0–2)
BILIRUB SERPL-MCNC: 0.8 MG/DL — SIGNIFICANT CHANGE UP (ref 0.2–1.2)
BUN SERPL-MCNC: 20 MG/DL — SIGNIFICANT CHANGE UP (ref 7–23)
BUN SERPL-MCNC: 22 MG/DL — SIGNIFICANT CHANGE UP (ref 7–23)
CALCIUM SERPL-MCNC: 8.1 MG/DL — LOW (ref 8.4–10.5)
CALCIUM SERPL-MCNC: 8.3 MG/DL — LOW (ref 8.4–10.5)
CHLORIDE SERPL-SCNC: 108 MMOL/L — SIGNIFICANT CHANGE UP (ref 96–108)
CHLORIDE SERPL-SCNC: 109 MMOL/L — HIGH (ref 96–108)
CO2 SERPL-SCNC: 23 MMOL/L — SIGNIFICANT CHANGE UP (ref 22–31)
CO2 SERPL-SCNC: 23 MMOL/L — SIGNIFICANT CHANGE UP (ref 22–31)
CREAT SERPL-MCNC: 1 MG/DL — SIGNIFICANT CHANGE UP (ref 0.5–1.3)
CREAT SERPL-MCNC: 1.15 MG/DL — SIGNIFICANT CHANGE UP (ref 0.5–1.3)
EOSINOPHIL # BLD AUTO: 0.13 K/UL — SIGNIFICANT CHANGE UP (ref 0–0.5)
EOSINOPHIL NFR BLD AUTO: 2 % — SIGNIFICANT CHANGE UP (ref 0–6)
GLUCOSE SERPL-MCNC: 92 MG/DL — SIGNIFICANT CHANGE UP (ref 70–99)
GLUCOSE SERPL-MCNC: 94 MG/DL — SIGNIFICANT CHANGE UP (ref 70–99)
HCT VFR BLD CALC: 42.1 % — SIGNIFICANT CHANGE UP (ref 39–50)
HGB BLD-MCNC: 13.6 G/DL — SIGNIFICANT CHANGE UP (ref 13–17)
IMM GRANULOCYTES NFR BLD AUTO: 0.3 % — SIGNIFICANT CHANGE UP (ref 0–1.5)
LYMPHOCYTES # BLD AUTO: 1.22 K/UL — SIGNIFICANT CHANGE UP (ref 1–3.3)
LYMPHOCYTES # BLD AUTO: 18.7 % — SIGNIFICANT CHANGE UP (ref 13–44)
MAGNESIUM SERPL-MCNC: 1.9 MG/DL — SIGNIFICANT CHANGE UP (ref 1.6–2.6)
MAGNESIUM SERPL-MCNC: 2.2 MG/DL — SIGNIFICANT CHANGE UP (ref 1.6–2.6)
MCHC RBC-ENTMCNC: 29.2 PG — SIGNIFICANT CHANGE UP (ref 27–34)
MCHC RBC-ENTMCNC: 32.3 GM/DL — SIGNIFICANT CHANGE UP (ref 32–36)
MCV RBC AUTO: 90.3 FL — SIGNIFICANT CHANGE UP (ref 80–100)
MONOCYTES # BLD AUTO: 0.65 K/UL — SIGNIFICANT CHANGE UP (ref 0–0.9)
MONOCYTES NFR BLD AUTO: 10 % — SIGNIFICANT CHANGE UP (ref 2–14)
NEUTROPHILS # BLD AUTO: 4.45 K/UL — SIGNIFICANT CHANGE UP (ref 1.8–7.4)
NEUTROPHILS NFR BLD AUTO: 68.4 % — SIGNIFICANT CHANGE UP (ref 43–77)
NRBC # BLD: 0 /100 WBCS — SIGNIFICANT CHANGE UP (ref 0–0)
PHOSPHATE SERPL-MCNC: 2 MG/DL — LOW (ref 2.5–4.5)
PHOSPHATE SERPL-MCNC: 3 MG/DL — SIGNIFICANT CHANGE UP (ref 2.5–4.5)
PLATELET # BLD AUTO: 107 K/UL — LOW (ref 150–400)
POTASSIUM SERPL-MCNC: 3.8 MMOL/L — SIGNIFICANT CHANGE UP (ref 3.5–5.3)
POTASSIUM SERPL-MCNC: 3.9 MMOL/L — SIGNIFICANT CHANGE UP (ref 3.5–5.3)
POTASSIUM SERPL-SCNC: 3.8 MMOL/L — SIGNIFICANT CHANGE UP (ref 3.5–5.3)
POTASSIUM SERPL-SCNC: 3.9 MMOL/L — SIGNIFICANT CHANGE UP (ref 3.5–5.3)
PROT SERPL-MCNC: 5.4 G/DL — LOW (ref 6–8.3)
RBC # BLD: 4.66 M/UL — SIGNIFICANT CHANGE UP (ref 4.2–5.8)
RBC # FLD: 14.4 % — SIGNIFICANT CHANGE UP (ref 10.3–14.5)
SODIUM SERPL-SCNC: 141 MMOL/L — SIGNIFICANT CHANGE UP (ref 135–145)
SODIUM SERPL-SCNC: 143 MMOL/L — SIGNIFICANT CHANGE UP (ref 135–145)
WBC # BLD: 6.51 K/UL — SIGNIFICANT CHANGE UP (ref 3.8–10.5)
WBC # FLD AUTO: 6.51 K/UL — SIGNIFICANT CHANGE UP (ref 3.8–10.5)

## 2020-02-18 PROCEDURE — 99152 MOD SED SAME PHYS/QHP 5/>YRS: CPT

## 2020-02-18 PROCEDURE — 99291 CRITICAL CARE FIRST HOUR: CPT

## 2020-02-18 PROCEDURE — 93010 ELECTROCARDIOGRAM REPORT: CPT

## 2020-02-18 PROCEDURE — 93454 CORONARY ARTERY ANGIO S&I: CPT | Mod: 26

## 2020-02-18 PROCEDURE — 99232 SBSQ HOSP IP/OBS MODERATE 35: CPT

## 2020-02-18 RX ORDER — POTASSIUM CHLORIDE 20 MEQ
20 PACKET (EA) ORAL ONCE
Refills: 0 | Status: COMPLETED | OUTPATIENT
Start: 2020-02-18 | End: 2020-02-18

## 2020-02-18 RX ORDER — POTASSIUM PHOSPHATE, MONOBASIC POTASSIUM PHOSPHATE, DIBASIC 236; 224 MG/ML; MG/ML
30 INJECTION, SOLUTION INTRAVENOUS ONCE
Refills: 0 | Status: COMPLETED | OUTPATIENT
Start: 2020-02-18 | End: 2020-02-18

## 2020-02-18 RX ORDER — ATROPINE SULFATE 0.1 MG/ML
1 SYRINGE (ML) INJECTION ONCE
Refills: 0 | Status: COMPLETED | OUTPATIENT
Start: 2020-02-18 | End: 2020-02-18

## 2020-02-18 RX ORDER — MAGNESIUM SULFATE 500 MG/ML
2 VIAL (ML) INJECTION ONCE
Refills: 0 | Status: COMPLETED | OUTPATIENT
Start: 2020-02-18 | End: 2020-02-18

## 2020-02-18 RX ORDER — METOPROLOL TARTRATE 50 MG
12.5 TABLET ORAL
Refills: 0 | Status: DISCONTINUED | OUTPATIENT
Start: 2020-02-18 | End: 2020-02-20

## 2020-02-18 RX ORDER — POTASSIUM CHLORIDE 20 MEQ
20 PACKET (EA) ORAL ONCE
Refills: 0 | Status: DISCONTINUED | OUTPATIENT
Start: 2020-02-18 | End: 2020-02-18

## 2020-02-18 RX ORDER — POTASSIUM CHLORIDE 20 MEQ
20 PACKET (EA) ORAL DAILY
Refills: 0 | Status: DISCONTINUED | OUTPATIENT
Start: 2020-02-18 | End: 2020-02-18

## 2020-02-18 RX ORDER — SODIUM CHLORIDE 9 MG/ML
250 INJECTION INTRAMUSCULAR; INTRAVENOUS; SUBCUTANEOUS ONCE
Refills: 0 | Status: COMPLETED | OUTPATIENT
Start: 2020-02-18 | End: 2020-02-18

## 2020-02-18 RX ADMIN — Medication 12.5 MILLIGRAM(S): at 05:37

## 2020-02-18 RX ADMIN — CLOPIDOGREL BISULFATE 75 MILLIGRAM(S): 75 TABLET, FILM COATED ORAL at 11:19

## 2020-02-18 RX ADMIN — POTASSIUM PHOSPHATE, MONOBASIC POTASSIUM PHOSPHATE, DIBASIC 83.33 MILLIMOLE(S): 236; 224 INJECTION, SOLUTION INTRAVENOUS at 06:17

## 2020-02-18 RX ADMIN — Medication 20 MILLIEQUIVALENT(S): at 17:54

## 2020-02-18 RX ADMIN — Medication 50 GRAM(S): at 05:37

## 2020-02-18 RX ADMIN — HEPARIN SODIUM 700 UNIT(S)/HR: 5000 INJECTION INTRAVENOUS; SUBCUTANEOUS at 05:38

## 2020-02-18 RX ADMIN — FAMOTIDINE 20 MILLIGRAM(S): 10 INJECTION INTRAVENOUS at 11:19

## 2020-02-18 RX ADMIN — ATORVASTATIN CALCIUM 80 MILLIGRAM(S): 80 TABLET, FILM COATED ORAL at 21:55

## 2020-02-18 RX ADMIN — Medication 12.5 MILLIGRAM(S): at 17:33

## 2020-02-18 RX ADMIN — Medication 81 MILLIGRAM(S): at 11:19

## 2020-02-18 RX ADMIN — SODIUM CHLORIDE 10 MILLILITER(S): 9 INJECTION INTRAMUSCULAR; INTRAVENOUS; SUBCUTANEOUS at 19:05

## 2020-02-18 RX ADMIN — CHLORHEXIDINE GLUCONATE 1 APPLICATION(S): 213 SOLUTION TOPICAL at 05:38

## 2020-02-18 RX ADMIN — SODIUM CHLORIDE 10 MILLILITER(S): 9 INJECTION INTRAMUSCULAR; INTRAVENOUS; SUBCUTANEOUS at 20:25

## 2020-02-18 RX ADMIN — SODIUM CHLORIDE 250 MILLILITER(S): 9 INJECTION INTRAMUSCULAR; INTRAVENOUS; SUBCUTANEOUS at 23:18

## 2020-02-18 RX ADMIN — Medication 1 MILLIGRAM(S): at 23:18

## 2020-02-18 RX ADMIN — SODIUM CHLORIDE 10 MILLILITER(S): 9 INJECTION INTRAMUSCULAR; INTRAVENOUS; SUBCUTANEOUS at 14:08

## 2020-02-18 NOTE — PROGRESS NOTE ADULT - SUBJECTIVE AND OBJECTIVE BOX
24H hour events: Patient without complaints. Tele unremarkable overnight with no further VT    MEDICATIONS:  aspirin enteric coated 81 milliGRAM(s) Oral daily  clopidogrel Tablet 75 milliGRAM(s) Oral daily  heparin  Infusion. 700 Unit(s)/Hr IV Continuous <Continuous>  heparin  Injectable 4000 Unit(s) IV Push every 6 hours PRN  metoprolol tartrate 12.5 milliGRAM(s) Oral two times a day  famotidine    Tablet 20 milliGRAM(s) Oral daily  atorvastatin 80 milliGRAM(s) Oral at bedtime  chlorhexidine 2% Cloths 1 Application(s) Topical <User Schedule>  sodium chloride 0.9%. 1000 milliLiter(s) IV Continuous <Continuous>      REVIEW OF SYSTEMS:  See HPI, otherwise ROS negative.    PHYSICAL EXAM:  T(C): 36.8 (02-18-20 @ 07:30), Max: 36.9 (02-18-20 @ 03:00)  HR: 80 (02-18-20 @ 09:00) (54 - 80)  BP: --  RR: 21 (02-18-20 @ 09:00) (13 - 22)  SpO2: 96% (02-18-20 @ 08:00) (95% - 99%)  Wt(kg): --  I&O's Summary    17 Feb 2020 07:01  -  18 Feb 2020 07:00  --------------------------------------------------------  IN: 1261.3 mL / OUT: 1725 mL / NET: -463.7 mL    18 Feb 2020 07:01  -  18 Feb 2020 10:50  --------------------------------------------------------  IN: 420.6 mL / OUT: 0 mL / NET: 420.6 mL        Appearance: Alert. NAD	  Cardiovascular: +S1S2 RRR no m/g/r  Respiratory: CTA B/L	  Psychiatry: A & O x 3, Mood & affect appropriate  Neurologic: Non-focal  Extremities: No edema BLE  Vascular: Peripheral pulses palpable 2+ bilaterally      LABS:	 	    CBC Full  -  ( 18 Feb 2020 04:22 )  WBC Count : 6.51 K/uL  Hemoglobin : 13.6 g/dL  Hematocrit : 42.1 %  Platelet Count - Automated : 107 K/uL  Mean Cell Volume : 90.3 fl  Mean Cell Hemoglobin : 29.2 pg  Mean Cell Hemoglobin Concentration : 32.3 gm/dL  Auto Neutrophil # : 4.45 K/uL  Auto Lymphocyte # : 1.22 K/uL  Auto Monocyte # : 0.65 K/uL  Auto Eosinophil # : 0.13 K/uL  Auto Basophil # : 0.04 K/uL  Auto Neutrophil % : 68.4 %  Auto Lymphocyte % : 18.7 %  Auto Monocyte % : 10.0 %  Auto Eosinophil % : 2.0 %  Auto Basophil % : 0.6 %    02-18    143  |  109<H>  |  20  ----------------------------<  92  3.8   |  23  |  1.00  02-17    140  |  110<H>  |  28<H>  ----------------------------<  140<H>  3.9   |  21<L>  |  1.05    Ca    8.1<L>      18 Feb 2020 04:22  Ca    8.3<L>      17 Feb 2020 05:02  Phos  2.0     02-18  Phos  2.8     02-17  Mg     1.9     02-18  Mg     2.1     02-17    TPro  5.4<L>  /  Alb  3.0<L>  /  TBili  0.8  /  DBili  x   /  AST  43<H>  /  ALT  24  /  AlkPhos  53  02-18  TPro  5.4<L>  /  Alb  3.0<L>  /  TBili  1.3<H>  /  DBili  x   /  AST  58<H>  /  ALT  27  /  AlkPhos  51  02-17      proBNP: Serum Pro-Brain Natriuretic Peptide: 1278 pg/mL (02-16 @ 21:23)  Serum Pro-Brain Natriuretic Peptide: 1769 pg/mL (02-16 @ 18:09)    Creatine Kinase, Serum: 334 U/L (02-17-20 @ 12:26)    TELEMETRY: SR with occasional PVC's    	  ASSESSMENT/PLAN: 	  63y/o male h/o AWMI s/p LAD PCI in 2013, ICM s/p Medtronic ICD p/w syncope & found to be in hemodynamically unstable VT ~190's bpm (below VT detect rate) s/p external DCCV  1. VT  2. Medtronic ICD--reprogrammed VT settings to rate of 182bpm  3. CAD s/p prior PCI    --For LHC today  --For VT ablation tomorrow pending cath results today  --Continue to monitor on tele off amio    Susana Ngo PA-C  [id number]

## 2020-02-18 NOTE — PROGRESS NOTE ADULT - SUBJECTIVE AND OBJECTIVE BOX
Omar Mcqueen, PGY-2  Internal Medicine   [phone]    PATIENT:  TIFFANIE MARKS  [id number]    CHIEF COMPLAINT:  Patient is a 64y old  Male who presents with a chief complaint of chest pain (2020 20:01)      INTERVAL HISTORY/OVERNIGHT EVENTS:      REVIEW OF SYSTEMS:    Constitutional:     [ ] negative [ ] fevers [ ] chills [ ] weight loss [ ] weight gain  HEENT:                  [ ] negative [ ] dry eyes [ ] eye irritation [ ] postnasal drip [ ] nasal congestion  CV:                         [ ] negative  [ ] chest pain [ ] orthopnea [ ] palpitations [ ] murmur  Resp:                     [ ] negative [ ] cough [ ] shortness of breath [ ] dyspnea [ ] wheezing [ ] sputum [ ] hemoptysis  GI:                          [ ] negative [ ] nausea [ ] vomiting [ ] diarrhea [ ] constipation [ ] abd pain [ ] dysphagia   :                        [ ] negative [ ] dysuria [ ] nocturia [ ] hematuria [ ] increased urinary frequency  Musculoskeletal: [ ] negative [ ] back pain [ ] myalgias [ ] arthralgias [ ] fracture  Skin:                       [ ] negative [ ] rash [ ] itch  Neurological:        [ ] negative [ ] headache [ ] dizziness [ ] syncope [ ] weakness [ ] numbness  Psychiatric:           [ ] negative [ ] anxiety [ ] depression  Endocrine:            [ ] negative [ ] diabetes [ ] thyroid problem  Heme/Lymph:      [ ] negative [ ] anemia [ ] bleeding problem  Allergic/Immune: [ ] negative [ ] itchy eyes [ ] nasal discharge [ ] hives [ ] angioedema    [ ] All other systems negative  [ ] Unable to assess ROS because ________.    MEDICATIONS:  MEDICATIONS  (STANDING):  aspirin enteric coated 81 milliGRAM(s) Oral daily  atorvastatin 80 milliGRAM(s) Oral at bedtime  chlorhexidine 2% Cloths 1 Application(s) Topical <User Schedule>  clopidogrel Tablet 75 milliGRAM(s) Oral daily  famotidine    Tablet 20 milliGRAM(s) Oral daily  heparin  Infusion. 700 Unit(s)/Hr (7 mL/Hr) IV Continuous <Continuous>  metoprolol tartrate 12.5 milliGRAM(s) Oral two times a day  sodium chloride 0.9%. 1000 milliLiter(s) (10 mL/Hr) IV Continuous <Continuous>    MEDICATIONS  (PRN):  heparin  Injectable 4000 Unit(s) IV Push every 6 hours PRN For aPTT less than 40      ALLERGIES:  Allergies    No Known Allergies    Intolerances        OBJECTIVE:  ICU Vital Signs Last 24 Hrs  T(C): 36.9 (2020 03:00), Max: 36.9 (2020 03:00)  T(F): 98.4 (2020 03:00), Max: 98.4 (2020 03:00)  HR: 66 (2020 06:00) (50 - 76)  BP: --  BP(mean): --  ABP: 138/62 (2020 06:00) (98/50 - 144/68)  ABP(mean): 90 (2020 06:00) (64 - 96)  RR: 13 (2020 06:00) (12 - 22)  SpO2: 98% (2020 05:00) (95% - 99%)      Adult Advanced Hemodynamics Last 24 Hrs  CVP(mm Hg): 1 (2020 03:00) (-9 - 7)  CVP(cm H2O): --  CO: --  CI: --  PA: --  PA(mean): --  PCWP: --  SVR: --  SVRI: --  PVR: --  PVRI: --  CAPILLARY BLOOD GLUCOSE        CAPILLARY BLOOD GLUCOSE      POCT Blood Glucose.: 85 mg/dL (2020 17:41)    I&O's Summary    2020 07:  -  2020 07:00  --------------------------------------------------------  IN: 1242 mL / OUT: 200 mL / NET: 1042 mL    2020 07:01  -  2020 06:56  --------------------------------------------------------  IN: 1261.3 mL / OUT: 1725 mL / NET: -463.7 mL      Daily     Daily Weight in k.6 (2020 02:00)    PHYSICAL EXAMINATION:  General: WN/WD NAD  HEENT: PERRLA, EOMI, moist mucous membranes  Neurology: A&Ox3, nonfocal, MARTIN x 4  Respiratory: CTA B/L, normal respiratory effort, no wheezes, crackles, rales  CV: RRR, S1S2, no murmurs, rubs or gallops  Abdominal: Soft, NT, ND +BS, Last BM  Extremities: No edema, + peripheral pulses  Incisions:   Tubes:    LABS:                          13.6   6.51  )-----------( 107      ( 2020 04:22 )             42.1     02-18    143  |  109<H>  |  20  ----------------------------<  92  3.8   |  23  |  1.00    Ca    8.1<L>      2020 04:22  Phos  2.0     -18  Mg     1.9     18    TPro  5.4<L>  /  Alb  3.0<L>  /  TBili  0.8  /  DBili  x   /  AST  43<H>  /  ALT  24  /  AlkPhos  53  02-18    LIVER FUNCTIONS - ( 2020 04:22 )  Alb: 3.0 g/dL / Pro: 5.4 g/dL / ALK PHOS: 53 U/L / ALT: 24 U/L / AST: 43 U/L / GGT: x           PT/INR - ( 2020 21:23 )   PT: 13.5 sec;   INR: 1.17 ratio         PTT - ( 2020 04:22 )  PTT:66.6 sec  CKMB Units: 34.0 ng/mL ( @ 12:26)  Creatine Kinase, Serum: 334 U/L ( @ 12:26)    CARDIAC MARKERS ( 2020 12:26 )  x     / x     / 334 U/L / x     / 34.0 ng/mL  CARDIAC MARKERS ( 2020 05:02 )  x     / x     / 411 U/L / x     / 45.9 ng/mL  CARDIAC MARKERS ( 2020 21:23 )  x     / x     / 397 U/L / x     / 36.8 ng/mL  CARDIAC MARKERS ( 2020 18:09 )  .070 ng/mL / x     / x     / x     / 5.6 ng/mL          TELEMETRY:     EKG:     IMAGING: Omar Mcqueen, PGY-2  Internal Medicine   [phone]    PATIENT:  TIFFANIE MARKS  [id number]    CHIEF COMPLAINT:  Patient is a 64y old  Male who presents with a chief complaint of chest pain (2020 20:01)      INTERVAL HISTORY/OVERNIGHT EVENTS:  No acute events overnight. Reports feeling well this AM. Has not had a BM since this past Saturday. Urinating well. Tolerating PO.       REVIEW OF SYSTEMS:    Constitutional:     [ X] negative [ ] fevers [ ] chills [ ] weight loss [ ] weight gain  HEENT:                  [X ] negative [ ] dry eyes [ ] eye irritation [ ] postnasal drip [ ] nasal congestion  CV:                         [X ] negative  [ ] chest pain [ ] orthopnea [ ] palpitations [ ] murmur  Resp:                     [X ] negative [ ] cough [ ] shortness of breath [ ] dyspnea [ ] wheezing [ ] sputum [ ] hemoptysis  GI:                          [X ] negative [ ] nausea [ ] vomiting [ ] diarrhea [ ] constipation [ ] abd pain [ ] dysphagia   :                        [X ] negative [ ] dysuria [ ] nocturia [ ] hematuria [ ] increased urinary frequency  Musculoskeletal: [X ] negative [ ] back pain [ ] myalgias [ ] arthralgias [ ] fracture  Skin:                       [X ] negative [ ] rash [ ] itch  Neurological:        [X ] negative [ ] headache [ ] dizziness [ ] syncope [ ] weakness [ ] numbness  Psychiatric:           [X ] negative [ ] anxiety [ ] depression  Endocrine:            [X ] negative [ ] diabetes [ ] thyroid problem  Heme/Lymph:      [X ] negative [ ] anemia [ ] bleeding problem  Allergic/Immune: [X ] negative [ ] itchy eyes [ ] nasal discharge [ ] hives [ ] angioedema    [ ] All other systems negative  [ ] Unable to assess ROS because ________.    MEDICATIONS:  MEDICATIONS  (STANDING):  aspirin enteric coated 81 milliGRAM(s) Oral daily  atorvastatin 80 milliGRAM(s) Oral at bedtime  chlorhexidine 2% Cloths 1 Application(s) Topical <User Schedule>  clopidogrel Tablet 75 milliGRAM(s) Oral daily  famotidine    Tablet 20 milliGRAM(s) Oral daily  heparin  Infusion. 700 Unit(s)/Hr (7 mL/Hr) IV Continuous <Continuous>  metoprolol tartrate 12.5 milliGRAM(s) Oral two times a day  sodium chloride 0.9%. 1000 milliLiter(s) (10 mL/Hr) IV Continuous <Continuous>    MEDICATIONS  (PRN):  heparin  Injectable 4000 Unit(s) IV Push every 6 hours PRN For aPTT less than 40      ALLERGIES:  Allergies    No Known Allergies    Intolerances        OBJECTIVE:  ICU Vital Signs Last 24 Hrs  T(C): 36.9 (2020 03:00), Max: 36.9 (2020 03:00)  T(F): 98.4 (2020 03:00), Max: 98.4 (2020 03:00)  HR: 66 (2020 06:00) (50 - 76)  BP: --  BP(mean): --  ABP: 138/62 (2020 06:00) (98/50 - 144/68)  ABP(mean): 90 (2020 06:00) (64 - 96)  RR: 13 (2020 06:00) (12 - 22)  SpO2: 98% (2020 05:00) (95% - 99%)      Adult Advanced Hemodynamics Last 24 Hrs  CVP(mm Hg): 1 (2020 03:00) (-9 - 7)  CVP(cm H2O): --  CO: --  CI: --  PA: --  PA(mean): --  PCWP: --  SVR: --  SVRI: --  PVR: --  PVRI: --  CAPILLARY BLOOD GLUCOSE        CAPILLARY BLOOD GLUCOSE      POCT Blood Glucose.: 85 mg/dL (2020 17:41)    I&O's Summary    2020 07:01  -  2020 07:00  --------------------------------------------------------  IN: 1242 mL / OUT: 200 mL / NET: 1042 mL    2020 07:01  -  2020 06:56  --------------------------------------------------------  IN: 1261.3 mL / OUT: 1725 mL / NET: -463.7 mL      Daily     Daily Weight in k.6 (2020 02:00)    PHYSICAL EXAMINATION:  General: WN/WD NAD  HEENT: PERRLA, EOMI, moist mucous membranes, has a right IJ   Neurology: A&Ox3, nonfocal, MARTIN x 4  Respiratory: CTA B/L, normal respiratory effort, no wheezes, crackles, rales has a left sided AICD C/D/I no signs of hematoma   CV: RRR, S1S2, no murmurs, rubs or gallops  Abdominal: Soft, NT, ND +BS, Last BM  Extremities: No edema, + peripheral pulses      LABS:                          13.6   6.51  )-----------( 107      ( 2020 04:22 )             42.1     02-18    143  |  109<H>  |  20  ----------------------------<  92  3.8   |  23  |  1.00    Ca    8.1<L>      2020 04:22  Phos  2.0     02-18  Mg     1.9     18    TPro  5.4<L>  /  Alb  3.0<L>  /  TBili  0.8  /  DBili  x   /  AST  43<H>  /  ALT  24  /  AlkPhos  53  02-18    LIVER FUNCTIONS - ( 2020 04:22 )  Alb: 3.0 g/dL / Pro: 5.4 g/dL / ALK PHOS: 53 U/L / ALT: 24 U/L / AST: 43 U/L / GGT: x           PT/INR - ( 2020 21:23 )   PT: 13.5 sec;   INR: 1.17 ratio         PTT - ( 2020 04:22 )  PTT:66.6 sec  CKMB Units: 34.0 ng/mL ( @ 12:26)  Creatine Kinase, Serum: 334 U/L ( @ 12:26)    CARDIAC MARKERS ( 2020 12:26 )  x     / x     / 334 U/L / x     / 34.0 ng/mL  CARDIAC MARKERS ( 2020 05:02 )  x     / x     / 411 U/L / x     / 45.9 ng/mL  CARDIAC MARKERS ( 2020 21:23 )  x     / x     / 397 U/L / x     / 36.8 ng/mL  CARDIAC MARKERS ( 2020 18:09 )  .070 ng/mL / x     / x     / x     / 5.6 ng/mL          TELEMETRY:     EKG:     IMAGING: Omar Murphy, PGY-2  Internal Medicine   [phone]    PATIENT:  TIFFANIE MARKS  [id number]    CHIEF COMPLAINT:  Patient is a 64y old  Male who presents with a chief complaint of chest pain (2020 20:01)      INTERVAL HISTORY/OVERNIGHT EVENTS:  No acute events overnight. Reports feeling well this AM. Has not had a BM since this past Saturday. Urinating well. Tolerating PO.       REVIEW OF SYSTEMS:    Constitutional:     [ X] negative [ ] fevers [ ] chills [ ] weight loss [ ] weight gain  HEENT:                  [X ] negative [ ] dry eyes [ ] eye irritation [ ] postnasal drip [ ] nasal congestion  CV:                         [X ] negative  [ ] chest pain [ ] orthopnea [ ] palpitations [ ] murmur  Resp:                     [X ] negative [ ] cough [ ] shortness of breath [ ] dyspnea [ ] wheezing [ ] sputum [ ] hemoptysis  GI:                          [X ] negative [ ] nausea [ ] vomiting [ ] diarrhea [ ] constipation [ ] abd pain [ ] dysphagia   :                        [X ] negative [ ] dysuria [ ] nocturia [ ] hematuria [ ] increased urinary frequency  Musculoskeletal: [X ] negative [ ] back pain [ ] myalgias [ ] arthralgias [ ] fracture  Skin:                       [X ] negative [ ] rash [ ] itch  Neurological:        [X ] negative [ ] headache [ ] dizziness [ ] syncope [ ] weakness [ ] numbness  Psychiatric:           [X ] negative [ ] anxiety [ ] depression  Endocrine:            [X ] negative [ ] diabetes [ ] thyroid problem  Heme/Lymph:      [X ] negative [ ] anemia [ ] bleeding problem  Allergic/Immune: [X ] negative [ ] itchy eyes [ ] nasal discharge [ ] hives [ ] angioedema    [ ] All other systems negative  [ ] Unable to assess ROS because ________.    MEDICATIONS:  MEDICATIONS  (STANDING):  aspirin enteric coated 81 milliGRAM(s) Oral daily  atorvastatin 80 milliGRAM(s) Oral at bedtime  chlorhexidine 2% Cloths 1 Application(s) Topical <User Schedule>  clopidogrel Tablet 75 milliGRAM(s) Oral daily  famotidine    Tablet 20 milliGRAM(s) Oral daily  heparin  Infusion. 700 Unit(s)/Hr (7 mL/Hr) IV Continuous <Continuous>  metoprolol tartrate 12.5 milliGRAM(s) Oral two times a day  sodium chloride 0.9%. 1000 milliLiter(s) (10 mL/Hr) IV Continuous <Continuous>    MEDICATIONS  (PRN):  heparin  Injectable 4000 Unit(s) IV Push every 6 hours PRN For aPTT less than 40      ALLERGIES:  Allergies    No Known Allergies    Intolerances        OBJECTIVE:  ICU Vital Signs Last 24 Hrs  T(C): 36.9 (2020 03:00), Max: 36.9 (2020 03:00)  T(F): 98.4 (2020 03:00), Max: 98.4 (2020 03:00)  HR: 66 (2020 06:00) (50 - 76)  BP: --  BP(mean): --  ABP: 138/62 (2020 06:00) (98/50 - 144/68)  ABP(mean): 90 (2020 06:00) (64 - 96)  RR: 13 (2020 06:00) (12 - 22)  SpO2: 98% (2020 05:00) (95% - 99%)      Adult Advanced Hemodynamics Last 24 Hrs  CVP(mm Hg): 1 (2020 03:00) (-9 - 7)  CVP(cm H2O): --  CO: --  CI: --  PA: --  PA(mean): --  PCWP: --  SVR: --  SVRI: --  PVR: --  PVRI: --  CAPILLARY BLOOD GLUCOSE        CAPILLARY BLOOD GLUCOSE      POCT Blood Glucose.: 85 mg/dL (2020 17:41)    I&O's Summary    2020 07:01  -  2020 07:00  --------------------------------------------------------  IN: 1242 mL / OUT: 200 mL / NET: 1042 mL    2020 07:01  -  2020 06:56  --------------------------------------------------------  IN: 1261.3 mL / OUT: 1725 mL / NET: -463.7 mL      Daily     Daily Weight in k.6 (2020 02:00)    PHYSICAL EXAMINATION:  General: WN/WD NAD  HEENT: PERRLA, EOMI, moist mucous membranes, has a right IJ   Neurology: A&Ox3, nonfocal, MARTIN x 4  Respiratory: CTA B/L, normal respiratory effort, no wheezes, crackles, rales has a right sided AICD C/D/I no signs of hematoma   CV: RRR, S1S2, no murmurs, rubs or gallops  Abdominal: Soft, NT, ND +BS, Last BM  Extremities: No edema, + peripheral pulses      LABS:                          13.6   6.51  )-----------( 107      ( 2020 04:22 )             42.1     02-18    143  |  109<H>  |  20  ----------------------------<  92  3.8   |  23  |  1.00    Ca    8.1<L>      2020 04:22  Phos  2.0     -18  Mg     1.9     18    TPro  5.4<L>  /  Alb  3.0<L>  /  TBili  0.8  /  DBili  x   /  AST  43<H>  /  ALT  24  /  AlkPhos  53  02-18    LIVER FUNCTIONS - ( 2020 04:22 )  Alb: 3.0 g/dL / Pro: 5.4 g/dL / ALK PHOS: 53 U/L / ALT: 24 U/L / AST: 43 U/L / GGT: x           PT/INR - ( 2020 21:23 )   PT: 13.5 sec;   INR: 1.17 ratio         PTT - ( 2020 04:22 )  PTT:66.6 sec  CKMB Units: 34.0 ng/mL ( @ 12:26)  Creatine Kinase, Serum: 334 U/L ( @ 12:26)    CARDIAC MARKERS ( 2020 12:26 )  x     / x     / 334 U/L / x     / 34.0 ng/mL  CARDIAC MARKERS ( 2020 05:02 )  x     / x     / 411 U/L / x     / 45.9 ng/mL  CARDIAC MARKERS ( 2020 21:23 )  x     / x     / 397 U/L / x     / 36.8 ng/mL  CARDIAC MARKERS ( 2020 18:09 )  .070 ng/mL / x     / x     / x     / 5.6 ng/mL          TELEMETRY:     EKG:     IMAGING:

## 2020-02-18 NOTE — PROGRESS NOTE ADULT - SUBJECTIVE AND OBJECTIVE BOX
====================  CCU MIDNIGHT ROUNDS  ====================  TIFFANIE MARKS  [id number]  Patient is a 64y old  Male who presents with a chief complaint of chest pain (18 Feb 2020 10:49)  ====================  SUMMARY:   64 male with PMHx of MI (2013) w/ DESx1, AICD, HTN, HLD, presented to [place] ED, with c/o dizziness, felt like blacking out, that started around 3pm this afternoon while at work, felt better after sitting for 1 hour.  He finished his shift at 1600 he felt chest tightness that lasted for 5 mins, he then drove home at around 1700, where he endorsed persistent chest discomfort that prompt him to drive to [place] ED. In ED triage, he has worsening chest tightnest, felt nauseous, dry heaving, diaphoretic, found to be hypotensive SBP 50's-70's was started on levo gtt, with VT 's, He was given amio 150mg x 1, s/p externally defib with 200J x 1 and returned to SR. EKG post DCCV shows SR with ST elevation in leads V1-V4, he was loaded with  mg, plavix 300 mg, heparin gtt and amiodarone gtt was started. He was transferred to [facility] for further management.  In CCU: he was continued on levophed gtt for pressor support, SBP: 100's MAP: 60's, amiodarone gtt 1 mg /min for VT, and hep gtt. Right radial A line and RIJ TLC was inserted, confirmed with CXR for placement. EKG with ST elevations in leads V1-V4, Trop T: 413. NSR 60's- 70's. Denies any chest discomfort, dizziness, n/v. (16 Feb 2020 21:16)  ====================  ====================  NEW EVENTS:  ====================  ====================  VITALS (Last 12 hrs):  ====================  T(C): 36.7 (02-18-20 @ 19:00), Max: 36.8 (02-18-20 @ 16:00)  T(F): 98.1 (02-18-20 @ 19:00), Max: 98.2 (02-18-20 @ 16:00)  HR: 78 (02-18-20 @ 19:00) (70 - 90)  BP: 97/64 (02-18-20 @ 19:00) (97/64 - 97/64)  BP(mean): 74 (02-18-20 @ 19:00) (74 - 74)  ABP: 124/58 (02-18-20 @ 19:00) (124/58 - 162/80)  ABP(mean): 78 (02-18-20 @ 19:00) (78 - 110)  RR: 18 (02-18-20 @ 19:00) (14 - 22)  SpO2: 99% (02-18-20 @ 19:00) (95% - 99%)  CVP(mm Hg): 3 (02-18-20 @ 19:00) (0 - 3)  I&O's Summary    17 Feb 2020 07:01  -  18 Feb 2020 07:00  --------------------------------------------------------  IN: 1261.3 mL / OUT: 1725 mL / NET: -463.7 mL    18 Feb 2020 07:01 - 18 Feb 2020 20:09  --------------------------------------------------------  IN: 1711.2 mL / OUT: 2500 mL / NET: -788.8 mL    ====================  NEW LABS:  ====================                          13.6   6.51  )-----------( 107      ( 18 Feb 2020 04:22 )             42.1     02-18    141  |  108  |  22  ----------------------------<  94  3.9   |  23  |  1.15    Ca    8.3<L>      18 Feb 2020 16:13  Phos  3.0     02-18  Mg     2.2     02-18    TPro  5.4<L>  /  Alb  3.0<L>  /  TBili  0.8  /  DBili  x   /  AST  43<H>  /  ALT  24  /  AlkPhos  53  02-18    PT/INR - ( 16 Feb 2020 21:23 )   PT: 13.5 sec;   INR: 1.17 ratio         PTT - ( 18 Feb 2020 04:22 )  PTT:66.6 sec    ====================  PLAN:  ====================  #neuro  #card  #GI  #  #ID  #discharge planning ====================  CCU MIDNIGHT ROUNDS  ====================  TIFFANIE MARKS  [id number]  Patient is a 64y old  Male who presents with a chief complaint of chest pain (18 Feb 2020 10:49)  ====================  SUMMARY:   64 male with PMHx of MI (2013) w/ DESx1, AICD, HTN, HLD, presented to [place] ED, with c/o dizziness, felt like blacking out, that started around 3pm this afternoon while at work, felt better after sitting for 1 hour.  He finished his shift at 1600 he felt chest tightness that lasted for 5 mins, he then drove home at around 1700, where he endorsed persistent chest discomfort that prompt him to drive to [place] ED. In ED triage, he has worsening chest tightnest, felt nauseous, dry heaving, diaphoretic, found to be hypotensive SBP 50's-70's was started on levo gtt, with VT 's, He was given amio 150mg x 1, s/p externally defib with 200J x 1 and returned to SR. EKG post DCCV shows SR with ST elevation in leads V1-V4, he was loaded with  mg, plavix 300 mg, heparin gtt and amiodarone gtt was started. He was transferred to [facility] for further management.  In CCU: he was continued on levophed gtt for pressor support, SBP: 100's MAP: 60's, amiodarone gtt 1 mg /min for VT, and hep gtt. Right radial A line and RIJ TLC was inserted, confirmed with CXR for placement. EKG with ST elevations in leads V1-V4, Trop T: 413. NSR 60's- 70's. Denies any chest discomfort, dizziness, n/v. (16 Feb 2020 21:16)  ====================  ====================  NEW EVENTS:   s/p LHC via Right femoral   ====================  ====================  VITALS (Last 12 hrs):  ====================  T(C): 36.7 (02-18-20 @ 19:00), Max: 36.8 (02-18-20 @ 16:00)  T(F): 98.1 (02-18-20 @ 19:00), Max: 98.2 (02-18-20 @ 16:00)  HR: 78 (02-18-20 @ 19:00) (70 - 90)  BP: 97/64 (02-18-20 @ 19:00) (97/64 - 97/64)  BP(mean): 74 (02-18-20 @ 19:00) (74 - 74)  ABP: 124/58 (02-18-20 @ 19:00) (124/58 - 162/80)  ABP(mean): 78 (02-18-20 @ 19:00) (78 - 110)  RR: 18 (02-18-20 @ 19:00) (14 - 22)  SpO2: 99% (02-18-20 @ 19:00) (95% - 99%)  CVP(mm Hg): 3 (02-18-20 @ 19:00) (0 - 3)  I&O's Summary    17 Feb 2020 07:01  -  18 Feb 2020 07:00  --------------------------------------------------------  IN: 1261.3 mL / OUT: 1725 mL / NET: -463.7 mL    18 Feb 2020 07:01  -  18 Feb 2020 20:09  --------------------------------------------------------  IN: 1711.2 mL / OUT: 2500 mL / NET: -788.8 mL    ====================  NEW LABS:  ====================                          13.6   6.51  )-----------( 107      ( 18 Feb 2020 04:22 )             42.1     02-18    141  |  108  |  22  ----------------------------<  94  3.9   |  23  |  1.15    Ca    8.3<L>      18 Feb 2020 16:13  Phos  3.0     02-18  Mg     2.2     02-18    TPro  5.4<L>  /  Alb  3.0<L>  /  TBili  0.8  /  DBili  x   /  AST  43<H>  /  ALT  24  /  AlkPhos  53  02-18    PT/INR - ( 16 Feb 2020 21:23 )   PT: 13.5 sec;   INR: 1.17 ratio         PTT - ( 18 Feb 2020 04:22 )  PTT:66.6 sec    ====================  PLAN:  ====================  #neuro  - no active issue   - AOX3    #resp  - no active issue at this time     #card  VT arrest: monomorphic appears to be from RV inferior/posterior  - no further episodes on tele, s/p amio, can consider lido gtt   - EP following, s/p device interrogated and VT zone adjusted to 182BPM with ATP  - s/p LHC, showing moderate disease in Lcx, and RCA   - pt has med[employer] AICD, plan for VT ablation in AM  - NPO p MN  - monitor BMP replete lytes    STEMI  - c/w asa 81 mg daily, plavix 75 mg daily  - c/w Metoprolol 12.5mg BID   - CE peaked at 831 and then downtrended  - TTE  - LHC today     #GI  GERD  - c/w pepcid   - If no BMs today will start bowel regimen     #Renal  - no active issue   -  monitor electrolytes    #DVT PPx  - Heparin gtt

## 2020-02-18 NOTE — PROGRESS NOTE ADULT - ASSESSMENT
64 M with hx of AWMI s/p BMS to LAD in 2013, severe ICM s/p ICD (medtronic), transferred from [place] ED, presents with syncope, found to be hypotensive, and VT, s/p DCCV x 1, STEMI, s/p ASA, plavix load, now on levo, amio and hep gtt.     #neuro   - no active issue at this time  - AOx3    # resp  - tolerating RA, SPO2 %    # card  VT - Monomorphic appears to be from RV inferior/posterior; has Medtronic AICD   - as per EP d/c amio gtt due to age if have recurrent VT can consider lidocaine gtt  - Will need ischemic w/u possible LHC tomorrow if CAD stable as per EP will consider VT ablation  - Device interrogated and VT zone adjusted to 182BPM with ATP  - infrequent PVC's on tele, cont to monitor   - monitor BMP  - replete lytes     STEMI  - s/p ASA, Plavix load  - c/w asa 81 mg daily, plavix 75 mg daily  - c/w hep gtt  - CE uptrending 413 -> 831 will check Q8H  - TTE pending  - likely need ischemic workup with hx of BMS and episode of VT     Hypotension  - Off levophed gtt this AM MAP > 65  - Holding Lisinopril and Metoprolol    #  - no active issue at this time   - monitor BMP    #GI  GERD  - c/w pepcid     #Renal - Had a SCr of 1.5 unclear baseline could be hemodynamically mediated in the setting of VT  - Currently SCr is dowtrending  - Will monitor electrolytes    #ID  - Had at one point elevated WBC but now resolved, afebrile  - Otherwise no active issues    #Heme  - Monitor H/H while on Heparin gtt    #Endo  - TSH normal  - A1C 5.4    #DVT PPx  - Heparin gtt 64 M with hx of AWMI s/p BMS to LAD in 2013, severe ICM s/p ICD (medtronic), transferred from [place] ED, presents with syncope, found to be hypotensive, and VT, s/p DCCV x 1, STEMI, s/p ASA, plavix load, now on levo, amio and hep gtt.     #neuro   - no active issue at this time  - AOx3    # resp  - tolerating RA, SPO2 %    # card  VT - Monomorphic appears to be from RV inferior/posterior; has Medtronic AICD   - as per EP d/c amio gtt due to age if have recurrent VT can consider lidocaine gtt  - Plan for LHC today and then if CAD stable may need VT ablation as per EP  - Device interrogated and VT zone adjusted to 182BPM with ATP  - infrequent PVC's on tele, cont to monitor   - monitor BMP  - replete lytes     STEMI  - s/p ASA, Plavix load  - c/w asa 81 mg daily, plavix 75 mg daily  - c/w hep gtt  - c/w Metoprolol 12.5mg BID   - CE peaked at 831 and then downtrending, no further need to check CE at this moment   - TTE final report pending  - LHC today     #  - no active issue at this time   - monitor BMP    #GI  GERD  - c/w pepcid   - If no BMs today will start bowel regimen     #Renal - Had a SCr of 1.5 unclear baseline could be hemodynamically mediated in the setting of VT  - Currently SCr is dowtrending  - Will monitor electrolytes    #ID  - Had at one point elevated WBC but now resolved, afebrile  - Otherwise no active issues    #Heme  - Monitor H/H while on Heparin gtt    #Endo  - TSH normal  - A1C 5.4    #DVT PPx  - Heparin gtt 64 M with hx of AWMI s/p BMS to LAD in 2013, severe ICM s/p ICD (medtronic), transferred from [place] ED, presents with syncope, found to be hypotensive, and VT, s/p DCCV x 1, STEMI, s/p ASA, plavix load, and heparin gtt. Off of pressor support. Pending [facility] today to assess for obstructive CAD. May need a VT ablation.      #Neuro  - no active issue at this time  - AOx3    #Pulmonary  - tolerating RA, SPO2 %    #Cardiovascular   VT - Monomorphic appears to be from RV inferior/posterior; has Medtronic AICD   - as per EP d/c amio gtt due to age if have recurrent VT can consider lidocaine gtt  - Plan for [facility] today and then if CAD stable may need VT ablation as per EP  - Device interrogated and VT zone adjusted to 182BPM with ATP  - infrequent PVC's on tele, cont to monitor   - monitor BMP  - replete lytes     STEMI  - s/p ASA, Plavix load  - c/w asa 81 mg daily, plavix 75 mg daily  - c/w hep gtt  - c/w Metoprolol 12.5mg BID   - CE peaked at 831 and then downtrending, no further need to check CE at this moment   - TTE final report pending  - [facility] today     #  - no active issue at this time   - monitor BMP    #GI  GERD  - c/w pepcid   - If no BMs today will start bowel regimen     #Renal - Had a SCr of 1.5 unclear baseline could be hemodynamically mediated in the setting of VT  - Currently SCr is dowtrending  - Will monitor electrolytes    #ID  - Had at one point elevated WBC but now resolved, afebrile  - Otherwise no active issues    #Heme  - Monitor H/H while on Heparin gtt    #Endo  - TSH normal  - A1C 5.4    #DVT PPx  - Heparin gtt

## 2020-02-19 LAB
ALBUMIN SERPL ELPH-MCNC: 3.1 G/DL — LOW (ref 3.3–5)
ALP SERPL-CCNC: 55 U/L — SIGNIFICANT CHANGE UP (ref 40–120)
ALT FLD-CCNC: 23 U/L — SIGNIFICANT CHANGE UP (ref 10–45)
ANION GAP SERPL CALC-SCNC: 10 MMOL/L — SIGNIFICANT CHANGE UP (ref 5–17)
APTT BLD: 25 SEC — LOW (ref 27.5–36.3)
AST SERPL-CCNC: 26 U/L — SIGNIFICANT CHANGE UP (ref 10–40)
BASOPHILS # BLD AUTO: 0.03 K/UL — SIGNIFICANT CHANGE UP (ref 0–0.2)
BASOPHILS NFR BLD AUTO: 0.5 % — SIGNIFICANT CHANGE UP (ref 0–2)
BILIRUB SERPL-MCNC: 0.6 MG/DL — SIGNIFICANT CHANGE UP (ref 0.2–1.2)
BLD GP AB SCN SERPL QL: NEGATIVE — SIGNIFICANT CHANGE UP
BUN SERPL-MCNC: 20 MG/DL — SIGNIFICANT CHANGE UP (ref 7–23)
CALCIUM SERPL-MCNC: 8.3 MG/DL — LOW (ref 8.4–10.5)
CHLORIDE SERPL-SCNC: 109 MMOL/L — HIGH (ref 96–108)
CK MB CFR SERPL CALC: 4.3 NG/ML — SIGNIFICANT CHANGE UP (ref 0–6.7)
CK SERPL-CCNC: 93 U/L — SIGNIFICANT CHANGE UP (ref 30–200)
CO2 SERPL-SCNC: 22 MMOL/L — SIGNIFICANT CHANGE UP (ref 22–31)
CREAT SERPL-MCNC: 0.95 MG/DL — SIGNIFICANT CHANGE UP (ref 0.5–1.3)
EOSINOPHIL # BLD AUTO: 0.07 K/UL — SIGNIFICANT CHANGE UP (ref 0–0.5)
EOSINOPHIL NFR BLD AUTO: 1.2 % — SIGNIFICANT CHANGE UP (ref 0–6)
GLUCOSE SERPL-MCNC: 104 MG/DL — HIGH (ref 70–99)
HCT VFR BLD CALC: 42.7 % — SIGNIFICANT CHANGE UP (ref 39–50)
HGB BLD-MCNC: 13.8 G/DL — SIGNIFICANT CHANGE UP (ref 13–17)
IMM GRANULOCYTES NFR BLD AUTO: 0.5 % — SIGNIFICANT CHANGE UP (ref 0–1.5)
INR BLD: 1.11 RATIO — SIGNIFICANT CHANGE UP (ref 0.88–1.16)
LACTATE SERPL-SCNC: 0.5 MMOL/L — LOW (ref 0.7–2)
LYMPHOCYTES # BLD AUTO: 0.82 K/UL — LOW (ref 1–3.3)
LYMPHOCYTES # BLD AUTO: 14.4 % — SIGNIFICANT CHANGE UP (ref 13–44)
MAGNESIUM SERPL-MCNC: 2.1 MG/DL — SIGNIFICANT CHANGE UP (ref 1.6–2.6)
MCHC RBC-ENTMCNC: 28.8 PG — SIGNIFICANT CHANGE UP (ref 27–34)
MCHC RBC-ENTMCNC: 32.3 GM/DL — SIGNIFICANT CHANGE UP (ref 32–36)
MCV RBC AUTO: 89.1 FL — SIGNIFICANT CHANGE UP (ref 80–100)
MONOCYTES # BLD AUTO: 0.53 K/UL — SIGNIFICANT CHANGE UP (ref 0–0.9)
MONOCYTES NFR BLD AUTO: 9.3 % — SIGNIFICANT CHANGE UP (ref 2–14)
NEUTROPHILS # BLD AUTO: 4.2 K/UL — SIGNIFICANT CHANGE UP (ref 1.8–7.4)
NEUTROPHILS NFR BLD AUTO: 74.1 % — SIGNIFICANT CHANGE UP (ref 43–77)
NRBC # BLD: 0 /100 WBCS — SIGNIFICANT CHANGE UP (ref 0–0)
PHOSPHATE SERPL-MCNC: 3 MG/DL — SIGNIFICANT CHANGE UP (ref 2.5–4.5)
PLATELET # BLD AUTO: 114 K/UL — LOW (ref 150–400)
POTASSIUM SERPL-MCNC: 4.3 MMOL/L — SIGNIFICANT CHANGE UP (ref 3.5–5.3)
POTASSIUM SERPL-SCNC: 4.3 MMOL/L — SIGNIFICANT CHANGE UP (ref 3.5–5.3)
PROT SERPL-MCNC: 5.7 G/DL — LOW (ref 6–8.3)
PROTHROM AB SERPL-ACNC: 12.8 SEC — SIGNIFICANT CHANGE UP (ref 10–12.9)
RBC # BLD: 4.79 M/UL — SIGNIFICANT CHANGE UP (ref 4.2–5.8)
RBC # FLD: 14.4 % — SIGNIFICANT CHANGE UP (ref 10.3–14.5)
RH IG SCN BLD-IMP: POSITIVE — SIGNIFICANT CHANGE UP
SODIUM SERPL-SCNC: 141 MMOL/L — SIGNIFICANT CHANGE UP (ref 135–145)
TROPONIN T, HIGH SENSITIVITY RESULT: 825 NG/L — HIGH (ref 0–51)
WBC # BLD: 5.68 K/UL — SIGNIFICANT CHANGE UP (ref 3.8–10.5)
WBC # FLD AUTO: 5.68 K/UL — SIGNIFICANT CHANGE UP (ref 3.8–10.5)

## 2020-02-19 PROCEDURE — 99291 CRITICAL CARE FIRST HOUR: CPT

## 2020-02-19 PROCEDURE — 93010 ELECTROCARDIOGRAM REPORT: CPT

## 2020-02-19 PROCEDURE — 93654 COMPRE EP EVAL TX VT: CPT

## 2020-02-19 PROCEDURE — 93662 INTRACARDIAC ECG (ICE): CPT | Mod: 26

## 2020-02-19 PROCEDURE — 93462 L HRT CATH TRNSPTL PUNCTURE: CPT

## 2020-02-19 RX ORDER — HEPARIN SODIUM 5000 [USP'U]/ML
700 INJECTION INTRAVENOUS; SUBCUTANEOUS
Qty: 25000 | Refills: 0 | Status: DISCONTINUED | OUTPATIENT
Start: 2020-02-19 | End: 2020-02-20

## 2020-02-19 RX ORDER — HEPARIN SODIUM 5000 [USP'U]/ML
5000 INJECTION INTRAVENOUS; SUBCUTANEOUS EVERY 8 HOURS
Refills: 0 | Status: DISCONTINUED | OUTPATIENT
Start: 2020-02-19 | End: 2020-02-19

## 2020-02-19 RX ORDER — ACETAMINOPHEN 500 MG
1000 TABLET ORAL ONCE
Refills: 0 | Status: COMPLETED | OUTPATIENT
Start: 2020-02-19 | End: 2020-02-19

## 2020-02-19 RX ORDER — SENNA PLUS 8.6 MG/1
2 TABLET ORAL AT BEDTIME
Refills: 0 | Status: DISCONTINUED | OUTPATIENT
Start: 2020-02-19 | End: 2020-02-24

## 2020-02-19 RX ORDER — CLOPIDOGREL BISULFATE 75 MG/1
75 TABLET, FILM COATED ORAL DAILY
Refills: 0 | Status: DISCONTINUED | OUTPATIENT
Start: 2020-02-19 | End: 2020-02-24

## 2020-02-19 RX ORDER — ACETAMINOPHEN 500 MG
650 TABLET ORAL ONCE
Refills: 0 | Status: COMPLETED | OUTPATIENT
Start: 2020-02-19 | End: 2020-02-19

## 2020-02-19 RX ORDER — HEPARIN SODIUM 5000 [USP'U]/ML
700 INJECTION INTRAVENOUS; SUBCUTANEOUS
Qty: 25000 | Refills: 0 | Status: DISCONTINUED | OUTPATIENT
Start: 2020-02-19 | End: 2020-02-19

## 2020-02-19 RX ORDER — POLYETHYLENE GLYCOL 3350 17 G/17G
17 POWDER, FOR SOLUTION ORAL EVERY 24 HOURS
Refills: 0 | Status: DISCONTINUED | OUTPATIENT
Start: 2020-02-19 | End: 2020-02-24

## 2020-02-19 RX ADMIN — SENNA PLUS 2 TABLET(S): 8.6 TABLET ORAL at 21:29

## 2020-02-19 RX ADMIN — Medication 400 MILLIGRAM(S): at 08:17

## 2020-02-19 RX ADMIN — Medication 12.5 MILLIGRAM(S): at 18:59

## 2020-02-19 RX ADMIN — FAMOTIDINE 20 MILLIGRAM(S): 10 INJECTION INTRAVENOUS at 18:59

## 2020-02-19 RX ADMIN — ATORVASTATIN CALCIUM 80 MILLIGRAM(S): 80 TABLET, FILM COATED ORAL at 21:27

## 2020-02-19 RX ADMIN — CLOPIDOGREL BISULFATE 75 MILLIGRAM(S): 75 TABLET, FILM COATED ORAL at 18:59

## 2020-02-19 RX ADMIN — Medication 1000 MILLIGRAM(S): at 08:40

## 2020-02-19 RX ADMIN — HEPARIN SODIUM 700 UNIT(S)/HR: 5000 INJECTION INTRAVENOUS; SUBCUTANEOUS at 23:37

## 2020-02-19 RX ADMIN — CHLORHEXIDINE GLUCONATE 1 APPLICATION(S): 213 SOLUTION TOPICAL at 05:01

## 2020-02-19 RX ADMIN — Medication 650 MILLIGRAM(S): at 21:00

## 2020-02-19 RX ADMIN — Medication 81 MILLIGRAM(S): at 18:59

## 2020-02-19 RX ADMIN — SODIUM CHLORIDE 10 MILLILITER(S): 9 INJECTION INTRAMUSCULAR; INTRAVENOUS; SUBCUTANEOUS at 10:02

## 2020-02-19 RX ADMIN — Medication 12.5 MILLIGRAM(S): at 06:00

## 2020-02-19 RX ADMIN — Medication 650 MILLIGRAM(S): at 20:14

## 2020-02-19 RX ADMIN — SODIUM CHLORIDE 10 MILLILITER(S): 9 INJECTION INTRAMUSCULAR; INTRAVENOUS; SUBCUTANEOUS at 04:00

## 2020-02-19 NOTE — CHART NOTE - NSCHARTNOTEFT_GEN_A_CORE
Removal of Femoral Sheath    Pulses in the (right/left) lower extremity are (palpable/audible by doppler/absent). The patient was placed in the supine position. The insertion site was identified and the sutures were removed per protocol.  The 5 Welsh femoral sheath was then removed. Direct pressure was applied for 15 minutes.     Monitoring of the (right/left) groin and both lower extremities including neuro-vascular checks and vital signs every 15 minutes x 4, then every 30 minutes x 2, then every 1 hour was ordered.    Complications: None    Comments: pt had a vagal episode during procedure, pt was given atropine 1 amp and normal saline 250 cc bolus. will continue to monitor closely.

## 2020-02-19 NOTE — PROGRESS NOTE ADULT - SUBJECTIVE AND OBJECTIVE BOX
24H hour events: no events over night , remains in SR no VT     MEDICATIONS:  aspirin enteric coated 81 milliGRAM(s) Oral daily  clopidogrel Tablet 75 milliGRAM(s) Oral daily  metoprolol tartrate 12.5 milliGRAM(s) Oral two times a day  famotidine    Tablet 20 milliGRAM(s) Oral daily  atorvastatin 80 milliGRAM(s) Oral at bedtime    chlorhexidine 2% Cloths 1 Application(s) Topical <User Schedule>  sodium chloride 0.9%. 1000 milliLiter(s) IV Continuous <Continuous>      REVIEW OF SYSTEMS:  See HPI, otherwise ROS negative.    PHYSICAL EXAM:  T(C): 36.7 (02-19-20 @ 12:00), Max: 36.8 (02-18-20 @ 16:00)  HR: 68 (02-19-20 @ 12:00) (44 - 90)  BP: 120/70 (02-19-20 @ 12:00) (97/64 - 120/73)  RR: 18 (02-19-20 @ 12:00) (13 - 20)  SpO2: 98% (02-19-20 @ 12:00) (96% - 100%)    I&O's Summary    18 Feb 2020 07:01  -  19 Feb 2020 07:00  --------------------------------------------------------  IN: 2294.2 mL / OUT: 3550 mL / NET: -1255.8 mL    19 Feb 2020 07:01  -  19 Feb 2020 13:14  --------------------------------------------------------  IN: 150 mL / OUT: 380 mL / NET: -230 mL    Appearance: Alert. NAD	  Cardiovascular: +S1S2 RRR no m/g/r  Respiratory: CTA B/L	  Psychiatry: A & O x 3, Mood & affect appropriate  Gastrointestinal:  Soft, NT. ND. +BS	  Skin: right groin no hematoma, flat 	  Neurologic: Non-focal  Extremities: No edema BLE  Vascular: Peripheral pulses palpable 2+ bilaterally    LABS:	 	    CBC Full  -  ( 19 Feb 2020 04:22 )  WBC Count : 5.68 K/uL  Hemoglobin : 13.8 g/dL  Hematocrit : 42.7 %  Platelet Count - Automated : 114 K/uL  Mean Cell Volume : 89.1 fl  Mean Cell Hemoglobin : 28.8 pg  Mean Cell Hemoglobin Concentration : 32.3 gm/dL  Auto Neutrophil # : 4.20 K/uL  Auto Lymphocyte # : 0.82 K/uL  Auto Monocyte # : 0.53 K/uL  Auto Eosinophil # : 0.07 K/uL  Auto Basophil # : 0.03 K/uL  Auto Neutrophil % : 74.1 %  Auto Lymphocyte % : 14.4 %  Auto Monocyte % : 9.3 %  Auto Eosinophil % : 1.2 %  Auto Basophil % : 0.5 %    02-19    141  |  109<H>  |  20  ----------------------------<  104<H>  4.3   |  22  |  0.95  02-18    141  |  108  |  22  ----------------------------<  94  3.9   |  23  |  1.15    Ca    8.3<L>      19 Feb 2020 04:22  Ca    8.3<L>      18 Feb 2020 16:13  Phos  3.0     02-19  Phos  3.0     02-18  Mg     2.1     02-19  Mg     2.2     02-18    TPro  5.7<L>  /  Alb  3.1<L>  /  TBili  0.6  /  DBili  x   /  AST  26  /  ALT  23  /  AlkPhos  55  02-19  TPro  5.4<L>  /  Alb  3.0<L>  /  TBili  0.8  /  DBili  x   /  AST  43<H>  /  ALT  24  /  AlkPhos  53  02-18    proBNP: Serum Pro-Brain Natriuretic Peptide: 1278 pg/mL (02-16 @ 21:23)  Serum Pro-Brain Natriuretic Peptide: 1769 pg/mL (02-16 @ 18:09)    TSH: Thyroid Stimulating Hormone, Serum (02.17.20 @ 00:39)    Thyroid Stimulating Hormone, Serum: 1.82 uIU/mL  Troponin T, High Sensitivity (02.19.20 @ 04:22)    Troponin T, High Sensitivity Result: 825: Rapid upward or downward changes in high-sensitivity troponin levels  suggest acute myocardial injury. Renal impairment may cause sustained  troponin elevations.  Normal: <6 - 14 ng/L  Indeterminate: 15-51 ng/L  Elevated: > 51 ng/L  See [email or web address]/test/[email or web address] on the [facility] intranet for more  information ng/L    Troponin T, High Sensitivity (02.17.20 @ 12:26)  Troponin T, High Sensitivity Result: 814:    2/17/20 Troponin T, High Sensitivity Result: 831: Rapid upward or downward changes in high-sensitivity troponin levels  suggest acute myocardial injury. Renal impairment may cause sustained  troponin elevations.  Troponin T, High Sensitivity Result: 825: Rapid upward or downward changes in high-sensitivity troponin levels  suggest acute myocardial injury. Renal impairment may cause sustained  troponin elevations.  creatine Kinase, Serum: 93 U/L (02-19-20 @ 04:22)  TELEMETRY: SR 60 's  	    RADIOLOGY:  2/18/20 cardiac cath, non diagnostic with mild to moderate distal disease no intervention

## 2020-02-19 NOTE — PROGRESS NOTE ADULT - ASSESSMENT
64 year old male HLD, HTN, CAD AWMI s/p LAD PCI in 2013, ICM s/p Medtronic ICD presented to [facility] s/p syncope & found to be in hemodynamically unstable VT ~190's bpm (below VT detect rate) s/p external DCCV he was loaded with  mg, Plavix 300 mg, heparin gtt and amiodarone gtt was started. He was transferred to [address] for further management. S/p diagnostic cardiac cath 2/18 mild to mod distal disease     1. VT  2. Medtronic ICD--reprogrammed VT settings to rate of 182bpm  3. CAD s/p prior PCI  NPO for VT ablation today   type and screen are current    Keep K+>4, MG++>2  continue with metoprolol 12.5 mg BID     [id number]

## 2020-02-19 NOTE — PROGRESS NOTE ADULT - SUBJECTIVE AND OBJECTIVE BOX
Omar Murphy, PGY-2  Internal Medicine   [phone]    PATIENT:  TIFFANIE MARKS  [id number]    CHIEF COMPLAINT:  Patient is a 64y old  Male who presents with a chief complaint of chest pain (2020 20:09)      INTERVAL HISTORY/OVERNIGHT EVENTS:      REVIEW OF SYSTEMS:    Constitutional:     [ ] negative [ ] fevers [ ] chills [ ] weight loss [ ] weight gain  HEENT:                  [ ] negative [ ] dry eyes [ ] eye irritation [ ] postnasal drip [ ] nasal congestion  CV:                         [ ] negative  [ ] chest pain [ ] orthopnea [ ] palpitations [ ] murmur  Resp:                     [ ] negative [ ] cough [ ] shortness of breath [ ] dyspnea [ ] wheezing [ ] sputum [ ] hemoptysis  GI:                          [ ] negative [ ] nausea [ ] vomiting [ ] diarrhea [ ] constipation [ ] abd pain [ ] dysphagia   :                        [ ] negative [ ] dysuria [ ] nocturia [ ] hematuria [ ] increased urinary frequency  Musculoskeletal: [ ] negative [ ] back pain [ ] myalgias [ ] arthralgias [ ] fracture  Skin:                       [ ] negative [ ] rash [ ] itch  Neurological:        [ ] negative [ ] headache [ ] dizziness [ ] syncope [ ] weakness [ ] numbness  Psychiatric:           [ ] negative [ ] anxiety [ ] depression  Endocrine:            [ ] negative [ ] diabetes [ ] thyroid problem  Heme/Lymph:      [ ] negative [ ] anemia [ ] bleeding problem  Allergic/Immune: [ ] negative [ ] itchy eyes [ ] nasal discharge [ ] hives [ ] angioedema    [ ] All other systems negative  [ ] Unable to assess ROS because ________.    MEDICATIONS:  MEDICATIONS  (STANDING):  aspirin enteric coated 81 milliGRAM(s) Oral daily  atorvastatin 80 milliGRAM(s) Oral at bedtime  chlorhexidine 2% Cloths 1 Application(s) Topical <User Schedule>  clopidogrel Tablet 75 milliGRAM(s) Oral daily  famotidine    Tablet 20 milliGRAM(s) Oral daily  metoprolol tartrate 12.5 milliGRAM(s) Oral two times a day  sodium chloride 0.9%. 1000 milliLiter(s) (10 mL/Hr) IV Continuous <Continuous>    MEDICATIONS  (PRN):      ALLERGIES:  Allergies    No Known Allergies    Intolerances        OBJECTIVE:  ICU Vital Signs Last 24 Hrs  T(C): 36.4 (2020 03:00), Max: 36.8 (2020 07:30)  T(F): 97.5 (2020 03:00), Max: 98.2 (2020 07:30)  HR: 67 (2020 06:00) (44 - 90)  BP: 115/71 (2020 20:30) (97/64 - 115/71)  BP(mean): 84 (2020 20:30) (74 - 84)  ABP: 148/70 (2020 06:00) (66/40 - 162/80)  ABP(mean): 98 (2020 06:00) (50 - 110)  RR: 18 (2020 06:00) (14 - 22)  SpO2: 98% (2020 06:00) (95% - 100%)      Adult Advanced Hemodynamics Last 24 Hrs  CVP(mm Hg): 2 (2020 06:00) (0 - 6)  CVP(cm H2O): --  CO: --  CI: --  PA: --  PA(mean): --  PCWP: --  SVR: --  SVRI: --  PVR: --  PVRI: --  CAPILLARY BLOOD GLUCOSE        CAPILLARY BLOOD GLUCOSE        I&O's Summary    2020 07:01  -  2020 07:00  --------------------------------------------------------  IN: 1261.3 mL / OUT: 1725 mL / NET: -463.7 mL    2020 07:01  -  2020 06:48  --------------------------------------------------------  IN: 2294.2 mL / OUT: 3150 mL / NET: -855.8 mL      Daily     Daily Weight in k.9 (2020 06:00)    PHYSICAL EXAMINATION:  General: WN/WD NAD  HEENT: PERRLA, EOMI, moist mucous membranes  Neurology: A&Ox3, nonfocal, MARTIN x 4  Respiratory: CTA B/L, normal respiratory effort, no wheezes, crackles, rales  CV: RRR, S1S2, no murmurs, rubs or gallops  Abdominal: Soft, NT, ND +BS, Last BM  Extremities: No edema, + peripheral pulses  Incisions:   Tubes:    LABS:                          13.8   5.68  )-----------( 114      ( 2020 04:22 )             42.7         141  |  109<H>  |  20  ----------------------------<  104<H>  4.3   |  22  |  0.95    Ca    8.3<L>      2020 04:22  Phos  3.0       Mg     2.1         TPro  5.7<L>  /  Alb  3.1<L>  /  TBili  0.6  /  DBili  x   /  AST  26  /  ALT  23  /  AlkPhos  55      LIVER FUNCTIONS - ( 2020 04:22 )  Alb: 3.1 g/dL / Pro: 5.7 g/dL / ALK PHOS: 55 U/L / ALT: 23 U/L / AST: 26 U/L / GGT: x           PT/INR - ( 2020 04:22 )   PT: 12.8 sec;   INR: 1.11 ratio         PTT - ( 2020 04:22 )  PTT:25.0 sec  CKMB Units: 4.3 ng/mL ( @ 04:22)  Creatine Kinase, Serum: 93 U/L ( @ 04:22)    CARDIAC MARKERS ( 2020 04:22 )  x     / x     / 93 U/L / x     / 4.3 ng/mL  CARDIAC MARKERS ( 2020 12:26 )  x     / x     / 334 U/L / x     / 34.0 ng/mL          TELEMETRY:     EKG:     IMAGING: Omar Murphy, PGY-2  Internal Medicine   [phone]    PATIENT:  TIFFANIE MARKS  [id number]    CHIEF COMPLAINT:  Patient is a 64y old  Male who presents with a chief complaint of chest pain (2020 20:09)      INTERVAL HISTORY/OVERNIGHT EVENTS:  Last night had LHC via Right femoral approach. Showed mild to moderate disease in the distal vessels but no intervention was performed. Had a vagal episode bradycardic and hypotensive was given an amp of Atropine and 250cc bolus and responded well. Heparin gtt d/c. Made NPO for VT ablation. This AM denies chest pain or shortness of breath or palpitations. Has 7/10 pain right wrist where A-line is which patient says likely from him being moved around.       REVIEW OF SYSTEMS:    Constitutional:     [X ] negative [ ] fevers [ ] chills [ ] weight loss [ ] weight gain  HEENT:                  [X ] negative [ ] dry eyes [ ] eye irritation [ ] postnasal drip [ ] nasal congestion  CV:                         [X ] negative  [ ] chest pain [ ] orthopnea [ ] palpitations [ ] murmur  Resp:                     [X ] negative [ ] cough [ ] shortness of breath [ ] dyspnea [ ] wheezing [ ] sputum [ ] hemoptysis  GI:                          [X ] negative [ ] nausea [ ] vomiting [ ] diarrhea [ ] constipation [ ] abd pain [ ] dysphagia   :                        [X ] negative [ ] dysuria [ ] nocturia [ ] hematuria [ ] increased urinary frequency  Musculoskeletal: [ ] negative [ ] back pain [ ] myalgias [ ] arthralgias [ ] fracture [X] right wrist pain  Skin:                       [X ] negative [ ] rash [ ] itch  Neurological:        [X ] negative [ ] headache [ ] dizziness [ ] syncope [ ] weakness [ ] numbness  Psychiatric:           [X ] negative [ ] anxiety [ ] depression  Endocrine:            [ ] negative [ ] diabetes [ ] thyroid problem  Heme/Lymph:      [ ] negative [ ] anemia [ ] bleeding problem  Allergic/Immune: [ ] negative [ ] itchy eyes [ ] nasal discharge [ ] hives [ ] angioedema    [ ] All other systems negative  [ ] Unable to assess ROS because ________.    MEDICATIONS:  MEDICATIONS  (STANDING):  aspirin enteric coated 81 milliGRAM(s) Oral daily  atorvastatin 80 milliGRAM(s) Oral at bedtime  chlorhexidine 2% Cloths 1 Application(s) Topical <User Schedule>  clopidogrel Tablet 75 milliGRAM(s) Oral daily  famotidine    Tablet 20 milliGRAM(s) Oral daily  metoprolol tartrate 12.5 milliGRAM(s) Oral two times a day  sodium chloride 0.9%. 1000 milliLiter(s) (10 mL/Hr) IV Continuous <Continuous>    MEDICATIONS  (PRN):      ALLERGIES:  Allergies    No Known Allergies    Intolerances        OBJECTIVE:  ICU Vital Signs Last 24 Hrs  T(C): 36.4 (2020 03:00), Max: 36.8 (2020 07:30)  T(F): 97.5 (2020 03:00), Max: 98.2 (2020 07:30)  HR: 67 (2020 06:00) (44 - 90)  BP: 115/71 (2020 20:30) (97/64 - 115/71)  BP(mean): 84 (2020 20:30) (74 - 84)  ABP: 148/70 (2020 06:00) (66/40 - 162/80)  ABP(mean): 98 (2020 06:00) (50 - 110)  RR: 18 (2020 06:00) (14 - 22)  SpO2: 98% (2020 06:00) (95% - 100%)      Adult Advanced Hemodynamics Last 24 Hrs  CVP(mm Hg): 2 (2020 06:00) (0 - 6)  CVP(cm H2O): --  CO: --  CI: --  PA: --  PA(mean): --  PCWP: --  SVR: --  SVRI: --  PVR: --  PVRI: --  CAPILLARY BLOOD GLUCOSE        CAPILLARY BLOOD GLUCOSE        I&O's Summary    2020 07:01  -  2020 07:00  --------------------------------------------------------  IN: 1261.3 mL / OUT: 1725 mL / NET: -463.7 mL    2020 07:01  -  2020 06:48  --------------------------------------------------------  IN: 2294.2 mL / OUT: 3150 mL / NET: -855.8 mL      Daily     Daily Weight in k.9 (2020 06:00)    PHYSICAL EXAMINATION:  General: WN/WD NAD  HEENT: PERRLA, EOMI, moist mucous membranes, has a right IJ C/D/I  Neurology: A&Ox3, nonfocal, MARTIN x 4  Respiratory: CTA B/L, normal respiratory effort, no wheezes, crackles, rales has a right sided AICD C/D/I no signs of hematoma   CV: RRR, S1S2, no murmurs, rubs or gallops no JVD no LE edema  Abdominal: Soft, NT, ND +BS  Groin: slight tenderness to palpation but no signs of hematoma at insertion site   MSK: tenderness at A-line site right wrist with no erythema, swelling, good pulses and good strength of hand   Extremities: No edema, 2+ peripheral pulses in UE and LE bilaterally     LABS:                          13.8   5.68  )-----------( 114      ( 2020 04:22 )             42.7     19    141  |  109<H>  |  20  ----------------------------<  104<H>  4.3   |  22  |  0.95    Ca    8.3<L>      2020 04:22  Phos  3.0       Mg     2.1         TPro  5.7<L>  /  Alb  3.1<L>  /  TBili  0.6  /  DBili  x   /  AST  26  /  ALT  23  /  AlkPhos  55  19    LIVER FUNCTIONS - ( 2020 04:22 )  Alb: 3.1 g/dL / Pro: 5.7 g/dL / ALK PHOS: 55 U/L / ALT: 23 U/L / AST: 26 U/L / GGT: x           PT/INR - ( 2020 04:22 )   PT: 12.8 sec;   INR: 1.11 ratio         PTT - ( 2020 04:22 )  PTT:25.0 sec  CKMB Units: 4.3 ng/mL ( @ 04:22)  Creatine Kinase, Serum: 93 U/L ( @ 04:22)    CARDIAC MARKERS ( 2020 04:22 )  x     / x     / 93 U/L / x     / 4.3 ng/mL  CARDIAC MARKERS ( 2020 12:26 )  x     / x     / 334 U/L / x     / 34.0 ng/mL          TELEMETRY:   Sinus bradycardia to 40s overnight with some PVCs     EKG:   NSR HR in 60s

## 2020-02-19 NOTE — PROGRESS NOTE ADULT - ASSESSMENT
64 M with hx of AWMI s/p BMS to LAD in 2013, severe ICM s/p ICD (medtronic), transferred from [place] ED, presents with syncope, found to be hypotensive, and VT, s/p DCCV x 1, STEMI, s/p ASA, plavix load, and heparin gtt. Off of pressor support. Pending [facility] today to assess for obstructive CAD. May need a VT ablation.      #Neuro  - no active issue at this time  - AOx3    #Pulmonary  - tolerating RA, SPO2 %    #Cardiovascular   VT - Monomorphic appears to be from RV inferior/posterior; has Medtronic AICD   - as per EP d/c amio gtt due to age if have recurrent VT can consider lidocaine gtt  - Plan for [facility] today and then if CAD stable may need VT ablation as per EP  - Device interrogated and VT zone adjusted to 182BPM with ATP  - infrequent PVC's on tele, cont to monitor   - monitor BMP  - replete lytes     STEMI  - s/p ASA, Plavix load  - c/w asa 81 mg daily, plavix 75 mg daily  - c/w hep gtt  - c/w Metoprolol 12.5mg BID   - CE peaked at 831 and then downtrending, no further need to check CE at this moment   - TTE final report pending  - [facility] today     #  - no active issue at this time   - monitor BMP    #GI  GERD  - c/w pepcid   - If no BMs today will start bowel regimen     #Renal - Had a SCr of 1.5 unclear baseline could be hemodynamically mediated in the setting of VT  - Currently SCr is dowtrending  - Will monitor electrolytes    #ID  - Had at one point elevated WBC but now resolved, afebrile  - Otherwise no active issues    #Heme  - Monitor H/H while on Heparin gtt    #Endo  - TSH normal  - A1C 5.4    #DVT PPx  - Heparin gtt 64 M with hx of AWMI s/p BMS to LAD in 2013, severe ICM s/p ICD (medtronic), transferred from [place] ED, presents with syncope, found to be hypotensive, and VT, s/p DCCV x 1, STEMI, s/p ASA, plavix load, and heparin gtt. Off of pressor support. s/p LHC showing mild-moderate distal disease. Pending VT ablation.      #Neuro  - no active issue at this time  - AOx3    #Pulmonary  - tolerating RA, SPO2 %    #Cardiovascular   VT - Monomorphic appears to be from RV inferior/posterior; has Medtronic AICD   - LHC on 2/19 showed mild-moderate disease in distal vessels with no interventions  - NPO for VT ablation today  - Device interrogated and VT zone adjusted to 182BPM with ATP  - infrequent PVC's on tele, cont to monitor   - monitor BMP  - replete lytes     STEMI - s/p LHC 2/19 showing mild-moderate disease in distal vessels with no interventions  - s/p ASA, Plavix load  - c/w asa 81 mg daily, plavix 75 mg daily  - d/c hep gtt  - c/w Metoprolol 12.5mg BID   -  this AM from 814 likely elevated from procedure   - TTE final report pending    #  - no active issue at this time   - monitor BMP    #GI  GERD  - NPO    #Renal   - Will monitor with daily BMPs  - No active issues     #ID  - No active issues    #Heme  - No active issues    #Endo  - TSH normal  - A1C 5.4    #DVT PPx  - HSQ Q8H

## 2020-02-19 NOTE — PROGRESS NOTE ADULT - SUBJECTIVE AND OBJECTIVE BOX
====================  CCU MIDNIGHT ROUNDS  ====================    TIFFANIE MARKS  [id number]  Patient is a 64y old  Male who presents with a chief complaint of chest pain (19 Feb 2020 17:29)      ====================  SUMMARY:  ====================        ====================  NEW EVENTS:  ====================        ====================  VITALS (Last 12 hrs):  ====================    T(C): 36.3 (02-19-20 @ 17:41), Max: 36.7 (02-19-20 @ 12:00)  T(F): 97.3 (02-19-20 @ 17:41), Max: 98 (02-19-20 @ 12:00)  HR: 68 (02-19-20 @ 18:30) (54 - 74)  BP: 105/77 (02-19-20 @ 18:30) (105/77 - 120/70)  BP(mean): 88 (02-19-20 @ 18:30) (79 - 96)  ABP: 126/60 (02-19-20 @ 09:00) (126/60 - 128/60)  ABP(mean): 84 (02-19-20 @ 09:00) (84 - 84)  RR: 21 (02-19-20 @ 18:30) (11 - 21)  SpO2: 98% (02-19-20 @ 18:30) (96% - 99%)  Wt(kg): --  CVP(mm Hg): 3 (02-19-20 @ 12:00) (2 - 3)  CVP(cm H2O): --  CO: --  CI: --  PA: --  PA(mean): --  PCWP: --  SVR: --  PVR: --    I&O's Summary    18 Feb 2020 07:01  -  19 Feb 2020 07:00  --------------------------------------------------------  IN: 2294.2 mL / OUT: 3550 mL / NET: -1255.8 mL    19 Feb 2020 07:01  -  19 Feb 2020 19:58  --------------------------------------------------------  IN: 270 mL / OUT: 780 mL / NET: -510 mL            ====================  NEW LABS:  ====================                          13.8   5.68  )-----------( 114      ( 19 Feb 2020 04:22 )             42.7     02-19    141  |  109<H>  |  20  ----------------------------<  104<H>  4.3   |  22  |  0.95    Ca    8.3<L>      19 Feb 2020 04:22  Phos  3.0     02-19  Mg     2.1     02-19    TPro  5.7<L>  /  Alb  3.1<L>  /  TBili  0.6  /  DBili  x   /  AST  26  /  ALT  23  /  AlkPhos  55  02-19    PT/INR - ( 19 Feb 2020 04:22 )   PT: 12.8 sec;   INR: 1.11 ratio         PTT - ( 19 Feb 2020 04:22 )  PTT:25.0 sec  Creatine Kinase, Serum: 93 U/L (02-19-20 @ 04:22)    CKMB Units: 4.3 ng/mL (02-19 @ 04:22)          ====================  PLAN:  ====================  #neuro  #card  #GI  #  #ID  #discharge planning ====================  CCU MIDNIGHT ROUNDS  ====================  TIFFANIE MARKS  [id number]  Patient is a 64y old  Male who presents with a chief complaint of chest pain (19 Feb 2020 17:29)  ====================  SUMMARY:  64 male with PMHx of MI (2013) w/ DESx1, AICD, HTN, HLD, presented to [facility] ED, with c/o dizziness, felt like blacking out, that started around 3pm this afternoon while at work, felt better after sitting for 1 hour.  He finished his shift at 1600 he felt chest tightness that lasted for 5 mins, he then drove home at around 1700, where he endorsed persistent chest discomfort that prompt him to drive to [facility] ED. In ED triage, he has worsening chest tightnest, felt nauseous, dry heaving, diaphoretic, found to be hypotensive SBP 50's-70's was started on levo gtt, with VT 's, He was given amio 150mg x 1, s/p externally defib with 200J x 1 and returned to SR. EKG post DCCV shows SR with ST elevation in leads V1-V4, he was loaded with  mg, plavix 300 mg, heparin gtt and amiodarone gtt was started. He was transferred to [facility] for further management.  In CCU: he was continued on levophed gtt for pressor support, SBP: 100's MAP: 60's, amiodarone gtt 1 mg /min for VT, and hep gtt. Right radial A line and RIJ TLC was inserted, confirmed with CXR for placement. EKG with ST elevations in leads V1-V4, Trop T: 413. NSR 60's- 70's. Denies any chest discomfort, dizziness, n/v. (16 Feb 2020 21:16)  ====================  ====================  NEW EVENTS:  - s/p VT ablation, b/l femoral stable  - c/o right lower arm pain, s/p Right radial a line   ====================  ====================  VITALS (Last 12 hrs):  ====================  T(C): 36.3 (02-19-20 @ 17:41), Max: 36.7 (02-19-20 @ 12:00)  T(F): 97.3 (02-19-20 @ 17:41), Max: 98 (02-19-20 @ 12:00)  HR: 68 (02-19-20 @ 18:30) (54 - 74)  BP: 105/77 (02-19-20 @ 18:30) (105/77 - 120/70)  BP(mean): 88 (02-19-20 @ 18:30) (79 - 96)  ABP: 126/60 (02-19-20 @ 09:00) (126/60 - 128/60)  ABP(mean): 84 (02-19-20 @ 09:00) (84 - 84)  RR: 21 (02-19-20 @ 18:30) (11 - 21)  SpO2: 98% (02-19-20 @ 18:30) (96% - 99%)  CVP(mm Hg): 3 (02-19-20 @ 12:00) (2 - 3)  I&O's Summary    18 Feb 2020 07:01  -  19 Feb 2020 07:00  --------------------------------------------------------  IN: 2294.2 mL / OUT: 3550 mL / NET: -1255.8 mL    19 Feb 2020 07:01  -  19 Feb 2020 19:58  --------------------------------------------------------  IN: 270 mL / OUT: 780 mL / NET: -510 mL  ====================  NEW LABS:  ====================                          13.8   5.68  )-----------( 114      ( 19 Feb 2020 04:22 )             42.7     02-19    141  |  109<H>  |  20  ----------------------------<  104<H>  4.3   |  22  |  0.95    Ca    8.3<L>      19 Feb 2020 04:22  Phos  3.0     02-19  Mg     2.1     02-19    TPro  5.7<L>  /  Alb  3.1<L>  /  TBili  0.6  /  DBili  x   /  AST  26  /  ALT  23  /  AlkPhos  55  02-19    PT/INR - ( 19 Feb 2020 04:22 )   PT: 12.8 sec;   INR: 1.11 ratio         PTT - ( 19 Feb 2020 04:22 )  PTT:25.0 sec  Creatine Kinase, Serum: 93 U/L (02-19-20 @ 04:22)    CKMB Units: 4.3 ng/mL (02-19 @ 04:22)  ====================  PLAN:  ====================  #neuro  - no active issue   - AOX3    #resp  - no active issue at this time     #card  VT arrest: monomorphic appears to be from RV inferior/posterior  - no further episodes on tele, s/p amio, can consider lido gtt   - EP following, s/p device interrogated and VT zone adjusted to 182BPM with ATP  - s/p LHC, showing moderate disease in Lcx, and RCA   - pt has medtronic AICD, plan for VT ablation in AM  - s/p VT ablation   - monitor BMP replete lytes    STEMI  - c/w asa 81 mg daily, plavix 75 mg daily  - c/w Metoprolol 12.5mg BID   - c/w hep gtt     #GI  GERD  - c/w pepcid   - If no BMs today will start bowel regimen     #DVT PPx  -c/w Heparin gtt

## 2020-02-19 NOTE — PRE-ANESTHESIA EVALUATION ADULT - NSANTHPMHFT_GEN_ALL_CORE
STEMI and admission s/p ASA/Plavix load/Heparin gtt  CAD s/p BMS to LAD 2013  Severe ICM s/p ICD   Last EF 20-25%

## 2020-02-19 NOTE — PROGRESS NOTE ADULT - SUBJECTIVE AND OBJECTIVE BOX
TIFFANIE MARKS  [id number]    PROCEDURE:  VT ablation          INDICATION:  VT        ELECTROPHYSIOLOGIST(S):  MD Rose Barton MD        FINDINGS:  - Access: 8Fr x2 RFV, 8Fr x2 LFV, 8Fr RFA  - One VT inducible, LBBB left superior axis at 310 msec.  - Ablated Left and right ventricle septum.  - Non-inducible at the end.    COMPLICATIONS:  - None      RECOMMENDATIONS:  - Stop Amio  - Start Metoprolol and uptitrate as tolerated  - Start Heparin gtt in 6 hours, can d/c tomorrow.

## 2020-02-20 LAB
ALBUMIN SERPL ELPH-MCNC: 2.8 G/DL — LOW (ref 3.3–5)
ALP SERPL-CCNC: 56 U/L — SIGNIFICANT CHANGE UP (ref 40–120)
ALT FLD-CCNC: 45 U/L — SIGNIFICANT CHANGE UP (ref 10–45)
ANION GAP SERPL CALC-SCNC: 9 MMOL/L — SIGNIFICANT CHANGE UP (ref 5–17)
APTT BLD: 42 SEC — HIGH (ref 27.5–36.3)
APTT BLD: 60.6 SEC — HIGH (ref 27.5–36.3)
AST SERPL-CCNC: 62 U/L — HIGH (ref 10–40)
BASOPHILS # BLD AUTO: 0.02 K/UL — SIGNIFICANT CHANGE UP (ref 0–0.2)
BASOPHILS NFR BLD AUTO: 0.3 % — SIGNIFICANT CHANGE UP (ref 0–2)
BILIRUB SERPL-MCNC: 0.5 MG/DL — SIGNIFICANT CHANGE UP (ref 0.2–1.2)
BUN SERPL-MCNC: 21 MG/DL — SIGNIFICANT CHANGE UP (ref 7–23)
CALCIUM SERPL-MCNC: 8.5 MG/DL — SIGNIFICANT CHANGE UP (ref 8.4–10.5)
CHLORIDE SERPL-SCNC: 108 MMOL/L — SIGNIFICANT CHANGE UP (ref 96–108)
CO2 SERPL-SCNC: 22 MMOL/L — SIGNIFICANT CHANGE UP (ref 22–31)
CREAT SERPL-MCNC: 1.02 MG/DL — SIGNIFICANT CHANGE UP (ref 0.5–1.3)
EOSINOPHIL # BLD AUTO: 0.15 K/UL — SIGNIFICANT CHANGE UP (ref 0–0.5)
EOSINOPHIL NFR BLD AUTO: 2.4 % — SIGNIFICANT CHANGE UP (ref 0–6)
GLUCOSE SERPL-MCNC: 118 MG/DL — HIGH (ref 70–99)
HCT VFR BLD CALC: 42 % — SIGNIFICANT CHANGE UP (ref 39–50)
HGB BLD-MCNC: 13.5 G/DL — SIGNIFICANT CHANGE UP (ref 13–17)
IMM GRANULOCYTES NFR BLD AUTO: 0.3 % — SIGNIFICANT CHANGE UP (ref 0–1.5)
LACTATE SERPL-SCNC: 1 MMOL/L — SIGNIFICANT CHANGE UP (ref 0.7–2)
LYMPHOCYTES # BLD AUTO: 1 K/UL — SIGNIFICANT CHANGE UP (ref 1–3.3)
LYMPHOCYTES # BLD AUTO: 15.8 % — SIGNIFICANT CHANGE UP (ref 13–44)
MAGNESIUM SERPL-MCNC: 2 MG/DL — SIGNIFICANT CHANGE UP (ref 1.6–2.6)
MCHC RBC-ENTMCNC: 29.2 PG — SIGNIFICANT CHANGE UP (ref 27–34)
MCHC RBC-ENTMCNC: 32.1 GM/DL — SIGNIFICANT CHANGE UP (ref 32–36)
MCV RBC AUTO: 90.9 FL — SIGNIFICANT CHANGE UP (ref 80–100)
MONOCYTES # BLD AUTO: 0.84 K/UL — SIGNIFICANT CHANGE UP (ref 0–0.9)
MONOCYTES NFR BLD AUTO: 13.3 % — SIGNIFICANT CHANGE UP (ref 2–14)
NEUTROPHILS # BLD AUTO: 4.3 K/UL — SIGNIFICANT CHANGE UP (ref 1.8–7.4)
NEUTROPHILS NFR BLD AUTO: 67.9 % — SIGNIFICANT CHANGE UP (ref 43–77)
NRBC # BLD: 0 /100 WBCS — SIGNIFICANT CHANGE UP (ref 0–0)
PHOSPHATE SERPL-MCNC: 3.3 MG/DL — SIGNIFICANT CHANGE UP (ref 2.5–4.5)
PLATELET # BLD AUTO: 105 K/UL — LOW (ref 150–400)
POTASSIUM SERPL-MCNC: 3.6 MMOL/L — SIGNIFICANT CHANGE UP (ref 3.5–5.3)
POTASSIUM SERPL-SCNC: 3.6 MMOL/L — SIGNIFICANT CHANGE UP (ref 3.5–5.3)
PROT SERPL-MCNC: 5.4 G/DL — LOW (ref 6–8.3)
RBC # BLD: 4.62 M/UL — SIGNIFICANT CHANGE UP (ref 4.2–5.8)
RBC # FLD: 14.7 % — HIGH (ref 10.3–14.5)
SODIUM SERPL-SCNC: 139 MMOL/L — SIGNIFICANT CHANGE UP (ref 135–145)
WBC # BLD: 6.33 K/UL — SIGNIFICANT CHANGE UP (ref 3.8–10.5)
WBC # FLD AUTO: 6.33 K/UL — SIGNIFICANT CHANGE UP (ref 3.8–10.5)

## 2020-02-20 PROCEDURE — 93010 ELECTROCARDIOGRAM REPORT: CPT

## 2020-02-20 PROCEDURE — 99233 SBSQ HOSP IP/OBS HIGH 50: CPT

## 2020-02-20 RX ORDER — CHLORHEXIDINE GLUCONATE 213 G/1000ML
1 SOLUTION TOPICAL
Refills: 0 | Status: COMPLETED | OUTPATIENT
Start: 2020-02-20 | End: 2020-02-20

## 2020-02-20 RX ORDER — ACETAMINOPHEN 500 MG
1000 TABLET ORAL ONCE
Refills: 0 | Status: COMPLETED | OUTPATIENT
Start: 2020-02-20 | End: 2020-02-20

## 2020-02-20 RX ORDER — METOPROLOL TARTRATE 50 MG
12.5 TABLET ORAL EVERY 6 HOURS
Refills: 0 | Status: DISCONTINUED | OUTPATIENT
Start: 2020-02-20 | End: 2020-02-24

## 2020-02-20 RX ORDER — ACETAMINOPHEN 500 MG
650 TABLET ORAL ONCE
Refills: 0 | Status: COMPLETED | OUTPATIENT
Start: 2020-02-20 | End: 2020-02-20

## 2020-02-20 RX ORDER — VANCOMYCIN HCL 1 G
1000 VIAL (EA) INTRAVENOUS ONCE
Refills: 0 | Status: COMPLETED | OUTPATIENT
Start: 2020-02-21 | End: 2020-02-21

## 2020-02-20 RX ORDER — POTASSIUM CHLORIDE 20 MEQ
40 PACKET (EA) ORAL ONCE
Refills: 0 | Status: COMPLETED | OUTPATIENT
Start: 2020-02-20 | End: 2020-02-20

## 2020-02-20 RX ORDER — ACETAMINOPHEN 500 MG
650 TABLET ORAL EVERY 6 HOURS
Refills: 0 | Status: DISCONTINUED | OUTPATIENT
Start: 2020-02-20 | End: 2020-02-20

## 2020-02-20 RX ORDER — ACETAMINOPHEN 500 MG
975 TABLET ORAL EVERY 6 HOURS
Refills: 0 | Status: DISCONTINUED | OUTPATIENT
Start: 2020-02-20 | End: 2020-02-24

## 2020-02-20 RX ORDER — IBUPROFEN 200 MG
600 TABLET ORAL ONCE
Refills: 0 | Status: COMPLETED | OUTPATIENT
Start: 2020-02-20 | End: 2020-02-20

## 2020-02-20 RX ADMIN — Medication 12.5 MILLIGRAM(S): at 12:32

## 2020-02-20 RX ADMIN — CLOPIDOGREL BISULFATE 75 MILLIGRAM(S): 75 TABLET, FILM COATED ORAL at 12:32

## 2020-02-20 RX ADMIN — FAMOTIDINE 20 MILLIGRAM(S): 10 INJECTION INTRAVENOUS at 12:33

## 2020-02-20 RX ADMIN — Medication 975 MILLIGRAM(S): at 22:02

## 2020-02-20 RX ADMIN — POLYETHYLENE GLYCOL 3350 17 GRAM(S): 17 POWDER, FOR SOLUTION ORAL at 03:06

## 2020-02-20 RX ADMIN — CHLORHEXIDINE GLUCONATE 1 APPLICATION(S): 213 SOLUTION TOPICAL at 05:54

## 2020-02-20 RX ADMIN — CHLORHEXIDINE GLUCONATE 1 APPLICATION(S): 213 SOLUTION TOPICAL at 22:15

## 2020-02-20 RX ADMIN — Medication 1000 MILLIGRAM(S): at 01:45

## 2020-02-20 RX ADMIN — Medication 650 MILLIGRAM(S): at 07:15

## 2020-02-20 RX ADMIN — Medication 650 MILLIGRAM(S): at 14:45

## 2020-02-20 RX ADMIN — Medication 600 MILLIGRAM(S): at 16:30

## 2020-02-20 RX ADMIN — HEPARIN SODIUM 900 UNIT(S)/HR: 5000 INJECTION INTRAVENOUS; SUBCUTANEOUS at 06:40

## 2020-02-20 RX ADMIN — Medication 600 MILLIGRAM(S): at 15:52

## 2020-02-20 RX ADMIN — Medication 40 MILLIEQUIVALENT(S): at 06:39

## 2020-02-20 RX ADMIN — Medication 975 MILLIGRAM(S): at 22:40

## 2020-02-20 RX ADMIN — Medication 650 MILLIGRAM(S): at 14:16

## 2020-02-20 RX ADMIN — Medication 400 MILLIGRAM(S): at 01:20

## 2020-02-20 RX ADMIN — Medication 81 MILLIGRAM(S): at 12:32

## 2020-02-20 RX ADMIN — ATORVASTATIN CALCIUM 80 MILLIGRAM(S): 80 TABLET, FILM COATED ORAL at 21:27

## 2020-02-20 RX ADMIN — Medication 12.5 MILLIGRAM(S): at 18:16

## 2020-02-20 RX ADMIN — Medication 650 MILLIGRAM(S): at 06:39

## 2020-02-20 RX ADMIN — Medication 12.5 MILLIGRAM(S): at 05:55

## 2020-02-20 NOTE — DIETITIAN INITIAL EVALUATION ADULT. - REASON INDICATOR FOR ASSESSMENT
Seen for: length of stay on CCU  Source: patient, EMR    Per chart, pt is a 64 year old male with PMH of MI w/ BEL, AICD, HTN, HLD, presented with syncope, found to be hypotensive and VT, s/p DCCV x 1, STEMI. S/p LHC 2/18 with no planned interventions.

## 2020-02-20 NOTE — DIETITIAN INITIAL EVALUATION ADULT. - ADD RECOMMEND
1) Continue current diet order of DASH/TLC.  2) Verbal/written nutrition education reviewed; reinforce PRN.  3) Monitor tolerance to diet prescription, nutritional intake, weight trends, labs and skin integrity.

## 2020-02-20 NOTE — PROGRESS NOTE ADULT - SUBJECTIVE AND OBJECTIVE BOX
Omar Murphy, PGY-2  Internal Medicine   [phone]    PATIENT:  TIFFANIE MARKS  [id number]    CHIEF COMPLAINT:  Patient is a 64y old  Male who presents with a chief complaint of chest pain (19 Feb 2020 19:58)      INTERVAL HISTORY/OVERNIGHT EVENTS:      REVIEW OF SYSTEMS:    Constitutional:     [ ] negative [ ] fevers [ ] chills [ ] weight loss [ ] weight gain  HEENT:                  [ ] negative [ ] dry eyes [ ] eye irritation [ ] postnasal drip [ ] nasal congestion  CV:                         [ ] negative  [ ] chest pain [ ] orthopnea [ ] palpitations [ ] murmur  Resp:                     [ ] negative [ ] cough [ ] shortness of breath [ ] dyspnea [ ] wheezing [ ] sputum [ ] hemoptysis  GI:                          [ ] negative [ ] nausea [ ] vomiting [ ] diarrhea [ ] constipation [ ] abd pain [ ] dysphagia   :                        [ ] negative [ ] dysuria [ ] nocturia [ ] hematuria [ ] increased urinary frequency  Musculoskeletal: [ ] negative [ ] back pain [ ] myalgias [ ] arthralgias [ ] fracture  Skin:                       [ ] negative [ ] rash [ ] itch  Neurological:        [ ] negative [ ] headache [ ] dizziness [ ] syncope [ ] weakness [ ] numbness  Psychiatric:           [ ] negative [ ] anxiety [ ] depression  Endocrine:            [ ] negative [ ] diabetes [ ] thyroid problem  Heme/Lymph:      [ ] negative [ ] anemia [ ] bleeding problem  Allergic/Immune: [ ] negative [ ] itchy eyes [ ] nasal discharge [ ] hives [ ] angioedema    [ ] All other systems negative  [ ] Unable to assess ROS because ________.    MEDICATIONS:  MEDICATIONS  (STANDING):  aspirin enteric coated 81 milliGRAM(s) Oral daily  atorvastatin 80 milliGRAM(s) Oral at bedtime  chlorhexidine 2% Cloths 1 Application(s) Topical <User Schedule>  clopidogrel Tablet 75 milliGRAM(s) Oral daily  famotidine    Tablet 20 milliGRAM(s) Oral daily  heparin  Infusion. 700 Unit(s)/Hr (7 mL/Hr) IV Continuous <Continuous>  metoprolol tartrate 12.5 milliGRAM(s) Oral two times a day  polyethylene glycol 3350 17 Gram(s) Oral every 24 hours  senna 2 Tablet(s) Oral at bedtime    MEDICATIONS  (PRN):      ALLERGIES:  Allergies    No Known Allergies    Intolerances        OBJECTIVE:  ICU Vital Signs Last 24 Hrs  T(C): 36.7 (20 Feb 2020 04:00), Max: 37.1 (20 Feb 2020 00:00)  T(F): 98.1 (20 Feb 2020 04:00), Max: 98.7 (20 Feb 2020 00:00)  HR: 62 (20 Feb 2020 05:00) (54 - 80)  BP: 101/58 (20 Feb 2020 05:00) (97/61 - 120/70)  BP(mean): 71 (20 Feb 2020 05:00) (69 - 96)  ABP: 126/60 (19 Feb 2020 09:00) (126/60 - 128/60)  ABP(mean): 84 (19 Feb 2020 09:00) (84 - 84)  RR: 15 (20 Feb 2020 05:00) (11 - 21)  SpO2: 98% (20 Feb 2020 05:00) (96% - 99%)      Adult Advanced Hemodynamics Last 24 Hrs  CVP(mm Hg): 3 (19 Feb 2020 12:00) (2 - 3)  CVP(cm H2O): --  CO: --  CI: --  PA: --  PA(mean): --  PCWP: --  SVR: --  SVRI: --  PVR: --  PVRI: --  CAPILLARY BLOOD GLUCOSE        CAPILLARY BLOOD GLUCOSE        I&O's Summary    19 Feb 2020 07:01  -  20 Feb 2020 07:00  --------------------------------------------------------  IN: 769 mL / OUT: 1630 mL / NET: -861 mL      Daily Height in cm: 165.1 (19 Feb 2020 13:29)    Daily     PHYSICAL EXAMINATION:  General: WN/WD NAD  HEENT: PERRLA, EOMI, moist mucous membranes  Neurology: A&Ox3, nonfocal, MARTIN x 4  Respiratory: CTA B/L, normal respiratory effort, no wheezes, crackles, rales  CV: RRR, S1S2, no murmurs, rubs or gallops  Abdominal: Soft, NT, ND +BS, Last BM  Extremities: No edema, + peripheral pulses  Incisions:   Tubes:    LABS:                          13.8   5.68  )-----------( 114      ( 19 Feb 2020 04:22 )             42.7     02-20    139  |  108  |  21  ----------------------------<  118<H>  3.6   |  22  |  1.02    Ca    8.5      20 Feb 2020 05:38  Phos  3.3     02-20  Mg     2.0     02-20    TPro  5.4<L>  /  Alb  2.8<L>  /  TBili  0.5  /  DBili  x   /  AST  62<H>  /  ALT  45  /  AlkPhos  56  02-20    LIVER FUNCTIONS - ( 20 Feb 2020 05:38 )  Alb: 2.8 g/dL / Pro: 5.4 g/dL / ALK PHOS: 56 U/L / ALT: 45 U/L / AST: 62 U/L / GGT: x           PT/INR - ( 19 Feb 2020 04:22 )   PT: 12.8 sec;   INR: 1.11 ratio         PTT - ( 20 Feb 2020 05:38 )  PTT:42.0 sec    CARDIAC MARKERS ( 19 Feb 2020 04:22 )  x     / x     / 93 U/L / x     / 4.3 ng/mL          TELEMETRY:     EKG:     IMAGING: Omar Murphy, PGY-2  Internal Medicine   [phone]    PATIENT:  TIFFANIE MARKS  [id number]    CHIEF COMPLAINT:  Patient is a 64y old  Male who presents with a chief complaint of chest pain (19 Feb 2020 19:58)      INTERVAL HISTORY/OVERNIGHT EVENTS:  Patient is s/p VT ablation yesterday. Heparin gtt started. Worsening right wrist pain improving with Tylenol. No chest pain, shortness of breath or palpitations.       REVIEW OF SYSTEMS:    Constitutional:     [X ] negative [ ] fevers [ ] chills [ ] weight loss [ ] weight gain  HEENT:                  [X ] negative [ ] dry eyes [ ] eye irritation [ ] postnasal drip [ ] nasal congestion  CV:                         [X ] negative  [ ] chest pain [ ] orthopnea [ ] palpitations [ ] murmur  Resp:                     [X ] negative [ ] cough [ ] shortness of breath [ ] dyspnea [ ] wheezing [ ] sputum [ ] hemoptysis  GI:                          [X ] negative [ ] nausea [ ] vomiting [ ] diarrhea [ ] constipation [ ] abd pain [ ] dysphagia   :                        [X ] negative [ ] dysuria [ ] nocturia [ ] hematuria [ ] increased urinary frequency  Musculoskeletal: [X ] negative [ ] back pain [ ] myalgias [ ] arthralgias [ ] fracture  Skin:                       [X ] negative [ ] rash [ ] itch  Neurological:        [X ] negative [ ] headache [ ] dizziness [ ] syncope [ ] weakness [ ] numbness  Psychiatric:           [ ] negative [ ] anxiety [ ] depression  Endocrine:            [ ] negative [ ] diabetes [ ] thyroid problem  Heme/Lymph:      [ ] negative [ ] anemia [ ] bleeding problem  Allergic/Immune: [ ] negative [ ] itchy eyes [ ] nasal discharge [ ] hives [ ] angioedema    [ ] All other systems negative  [ ] Unable to assess ROS because ________.    MEDICATIONS:  MEDICATIONS  (STANDING):  aspirin enteric coated 81 milliGRAM(s) Oral daily  atorvastatin 80 milliGRAM(s) Oral at bedtime  chlorhexidine 2% Cloths 1 Application(s) Topical <User Schedule>  clopidogrel Tablet 75 milliGRAM(s) Oral daily  famotidine    Tablet 20 milliGRAM(s) Oral daily  heparin  Infusion. 700 Unit(s)/Hr (7 mL/Hr) IV Continuous <Continuous>  metoprolol tartrate 12.5 milliGRAM(s) Oral two times a day  polyethylene glycol 3350 17 Gram(s) Oral every 24 hours  senna 2 Tablet(s) Oral at bedtime    MEDICATIONS  (PRN):      ALLERGIES:  Allergies    No Known Allergies    Intolerances        OBJECTIVE:  ICU Vital Signs Last 24 Hrs  T(C): 36.7 (20 Feb 2020 04:00), Max: 37.1 (20 Feb 2020 00:00)  T(F): 98.1 (20 Feb 2020 04:00), Max: 98.7 (20 Feb 2020 00:00)  HR: 62 (20 Feb 2020 05:00) (54 - 80)  BP: 101/58 (20 Feb 2020 05:00) (97/61 - 120/70)  BP(mean): 71 (20 Feb 2020 05:00) (69 - 96)  ABP: 126/60 (19 Feb 2020 09:00) (126/60 - 128/60)  ABP(mean): 84 (19 Feb 2020 09:00) (84 - 84)  RR: 15 (20 Feb 2020 05:00) (11 - 21)  SpO2: 98% (20 Feb 2020 05:00) (96% - 99%)      Adult Advanced Hemodynamics Last 24 Hrs  CVP(mm Hg): 3 (19 Feb 2020 12:00) (2 - 3)  CVP(cm H2O): --  CO: --  CI: --  PA: --  PA(mean): --  PCWP: --  SVR: --  SVRI: --  PVR: --  PVRI: --  CAPILLARY BLOOD GLUCOSE        CAPILLARY BLOOD GLUCOSE        I&O's Summary    19 Feb 2020 07:01  -  20 Feb 2020 07:00  --------------------------------------------------------  IN: 769 mL / OUT: 1630 mL / NET: -861 mL      Daily Height in cm: 165.1 (19 Feb 2020 13:29)    Daily     PHYSICAL EXAMINATION:  General: WN/WD NAD  HEENT: PERRLA, EOMI, moist mucous membranes  Neurology: A&Ox3, nonfocal, MARTIN x 4  Respiratory: CTA B/L, normal respiratory effort, no wheezes, crackles, rales has a right sided AICD C/D/I no signs of hematoma   CV: RRR, S1S2, no murmurs, rubs or gallops no JVD no LE edema  Abdominal: Soft, NT, ND +BS  MSK: tenderness at A-line site (A-line removed) right wrist with no erythema, swelling, good pulses and good strength of hand   Extremities: No edema, 2+ peripheral pulses in UE and LE bilaterally     LABS:                          13.8   5.68  )-----------( 114      ( 19 Feb 2020 04:22 )             42.7     02-20    139  |  108  |  21  ----------------------------<  118<H>  3.6   |  22  |  1.02    Ca    8.5      20 Feb 2020 05:38  Phos  3.3     02-20  Mg     2.0     02-20    TPro  5.4<L>  /  Alb  2.8<L>  /  TBili  0.5  /  DBili  x   /  AST  62<H>  /  ALT  45  /  AlkPhos  56  02-20    LIVER FUNCTIONS - ( 20 Feb 2020 05:38 )  Alb: 2.8 g/dL / Pro: 5.4 g/dL / ALK PHOS: 56 U/L / ALT: 45 U/L / AST: 62 U/L / GGT: x           PT/INR - ( 19 Feb 2020 04:22 )   PT: 12.8 sec;   INR: 1.11 ratio         PTT - ( 20 Feb 2020 05:38 )  PTT:42.0 sec    CARDIAC MARKERS ( 19 Feb 2020 04:22 )  x     / x     / 93 U/L / x     / 4.3 ng/mL          TELEMETRY:   10 beats of monomorphic VT overnight    EKG:   Sinus rhythm    IMAGING:    Right Heart: Normal right atrium. Normal right ventricular  size and function. A device wire is noted in the right  heart. Normal tricuspid valve. Mild tricuspid  regurgitation. Normal pulmonic valve. Mild pulmonic  regurgitation.  Pericardium/Pleura: Normal pericardium with no pericardial  effusion.  Hemodynamic: Estimated right atrial pressure is 8 mm Hg.  Estimated right ventricular systolic pressure equals 33 mm  Hg, assuming right atrial pressure equals 8 mm Hg,  consistent with normal pulmonary pressures.  ------------------------------------------------------------------------  Conclusions:  1. Normal mitral valve. Mild-moderate mitral regurgitation.  2. Normal trileaflet aortic valve. Minimal aortic  regurgitation.  3. Normal aortic root. An intra-aortic balloon pump is  visualized in the descending thoracic aorta.  4. Severe segmental left ventricular systolic dysfunction.  Mid to distal septum, mid to distal anterior wall and all  apical segments are akinetic.  EF 30-35%.  5. Normal right ventricular size and function. A device  wire is noted in the right heart.  *** No previous Echo exam.

## 2020-02-20 NOTE — PROGRESS NOTE ADULT - ASSESSMENT
64 M with hx of AWMI s/p BMS to LAD in 2013, severe ICM s/p ICD (medtronic), transferred from [place] ED, presents with syncope, found to be hypotensive, and VT, s/p DCCV x 1, STEMI, s/p ASA, plavix load, and heparin gtt. Off of pressor support. s/p LHC showing mild-moderate distal disease. Pending VT ablation.      #Neuro  - no active issue at this time  - AOx3    #Pulmonary  - tolerating RA, SPO2 %    #Cardiovascular   VT - Monomorphic appears to be from RV inferior/posterior; has Medtronic AICD   - LHC on 2/19 showed mild-moderate disease in distal vessels with no interventions  - NPO for VT ablation today  - Device interrogated and VT zone adjusted to 182BPM with ATP  - infrequent PVC's on tele, cont to monitor   - monitor BMP  - replete lytes     CAD - s/p C 2/19 showing mild-moderate disease in distal vessels with no interventions  - s/p ASA, Plavix load  - c/w asa 81 mg daily, plavix 75 mg daily  - d/c hep gtt  - c/w Metoprolol 12.5mg BID   -  this AM from 814 likely elevated from procedure   - TTE final report pending    #  - no active issue at this time   - monitor BMP    #GI  GERD  - NPO    #Renal   - Will monitor with daily BMPs  - No active issues     #ID  - No active issues    #Heme  - No active issues    #Endo  - TSH normal  - A1C 5.4    #DVT PPx  - HSQ Q8H 64 M with hx of AWMI s/p BMS to LAD in 2013, severe ICM s/p ICD (medtronic), transferred from [place] ED, presents with syncope, found to be hypotensive, and VT, s/p DCCV x 1, STEMI, s/p ASA, plavix load, and heparin gtt. Off of pressor support. s/p LHC showing mild-moderate distal disease. s/p VT ablation 2/19.      #Neuro  - no active issue at this time  - AOx3    #Pulmonary  - tolerating RA, SPO2 %    #Cardiovascular   VT - Monomorphic appears to be from RV inferior/posterior; has Medtronic AICD s/p VT ablation  - LHC on 2/19 showed mild-moderate disease in distal vessels with no interventions  - Device interrogated and VT zone adjusted to 182BPM with ATP, VVI adjusted to 40  - As per EP plan for AICD battery change and adjustment during this admission  - Continue heparin gtt for now prophylactically to prevent LV thrombus formation s/p ablation  - monitor BMP  - replete lytes     CAD - s/p LHC 2/19 showing mild-moderate disease in distal vessels with no interventions  - s/p ASA, Plavix load  - c/w asa 81 mg daily, plavix 75 mg daily  - Increased Metoprolol to 12.5mg Q6H   -  this AM from 814 likely elevated from procedure   - TTE shows EF of 30-35% with segmental abnormalities     #  - no active issue at this time   - monitor BMP    #GI  GERD  - Continue with Pepcid    #Renal   - Will monitor with daily BMPs  - No active issues     #ID  - No active issues    #Heme  - No active issues    #Endo  - TSH normal  - A1C 5.4    #DVT PPx  - HSQ Q8H 64 M with hx of AWMI s/p BMS to LAD in 2013, severe ICM s/p ICD (medtronic), transferred from [place] ED, presents with syncope, found to be hypotensive, and VT, s/p DCCV x 1, STEMI, s/p ASA, plavix load, and heparin gtt. Off of pressor support. s/p LHC showing mild-moderate distal disease. s/p VT ablation 2/19.      #Neuro  - no active issue at this time  - AOx3    #Pulmonary  - tolerating RA, SPO2 %    #Cardiovascular   VT - Monomorphic appears to be from RV inferior/posterior; has Medtronic AICD s/p VT ablation  - LHC on 2/19 showed mild-moderate disease in distal vessels with no interventions  - Device interrogated and VT zone adjusted to 182BPM with ATP, VVI adjusted to 40  - As per EP plan for AICD battery change and adjustment during this admission  - Continue heparin gtt for now prophylactically to prevent LV thrombus formation s/p ablation  - monitor BMP  - replete lytes     Type 2 NSTEMI - s/p LHC 2/19 showing mild-moderate disease in distal vessels with no interventions  - s/p ASA, Plavix load  - c/w asa 81 mg daily, plavix 75 mg daily  - Increased Metoprolol to 12.5mg Q6H   -  this AM from 814 likely elevated from procedure   - TTE shows EF of 30-35% with segmental abnormalities     #  - no active issue at this time   - monitor BMP    #GI  GERD  - Continue with Pepcid    #Renal   - Will monitor with daily BMPs  - No active issues     #ID  - No active issues    #Heme  - No active issues    #Endo  - TSH normal  - A1C 5.4    #DVT PPx  - HSQ Q8H

## 2020-02-20 NOTE — DIETITIAN INITIAL EVALUATION ADULT. - FACTORS AFF FOOD INTAKE
none/Endorses constipation; last BM 2/15. Reports took miralax/senna for first time last night and plans to have BM soon. Denies nausea/vomiting, chewing/swallowing difficulties.

## 2020-02-20 NOTE — CHART NOTE - NSCHARTNOTEFT_GEN_A_CORE
MAR CCU TRANSFER NOTE      HPI / CCU COURSE:  Patient is a 64 male with PMHx of MI (2013) w/ BMSx1, ICM, AICD, HTN, HLD, presented to [facility] ED, with c/o dizziness and episodes of CP. In [facility] ED triage, he has worsening chest tightness, felt nauseous, dry heaving, diaphoretic, found to be hypotensive SBP 50's-70's was started on levo gtt, with VT 's, He was given amio 150mg x 1, s/p externally defib with 200J x 1 and returned to SR. EKG post DCCV shows SR with ST elevation in leads V1-V4, he was loaded with  mg, plavix 300 mg, heparin gtt and amiodarone gtt was started. He was transferred to [facility]U for further management.    In CCU: he was continued on levophed gtt for pressor support, SBP: 100's MAP: 60's, amiodarone gtt 1 mg /min for VT, and hep gtt. EKG with ST elevations in leads V1-V4, Trop T: 413    CCU Course:  Patient was admitted for concern for STEMI and Monomorphic VT. For STEMI patient on prior EKG had ST elevations V1-V4, more pronounced this time so was on heparin gtt. Also on amio gtt initially due to VT but as per EP was discontinued and adjusted monitor zone to lower level to 182 BPM so it can be ATP'd. Patient had LHC on 2/18 which showed mild-moderate distal vessel disease but no interventions necessary. Troponins peaked to 831 likely all in the setting of type 2 NSTEMI. Had VT ablation 2/19. Had one episode of 10 beats of NSVT overnight otherwise no further episodes. Put back on heparin gtt due to concern for stunned myocardium with recent VT ablation theoretical risk for LV thrombus formation. Hemodynamically stable. Metoprolol increased to 12.5mg Q6H. Course complicated by right wrist pain and swelling at A-line site, bedside sonogram showed no clot formation and good blood flow with doppler. Patient now stable to transfer out of CCU.       Vital Signs Last 24 Hrs  T(C): 36.8 (20 Feb 2020 14:00), Max: 37.1 (20 Feb 2020 00:00)  T(F): 98.3 (20 Feb 2020 14:00), Max: 98.7 (20 Feb 2020 00:00)  HR: 82 (20 Feb 2020 15:00) (60 - 84)  BP: 121/68 (20 Feb 2020 15:00) (97/61 - 131/77)  BP(mean): 86 (20 Feb 2020 15:00) (69 - 96)  RR: 17 (20 Feb 2020 15:00) (11 - 21)  SpO2: 98% (20 Feb 2020 15:00) (96% - 99%)  I&O's Summary    19 Feb 2020 07:01  -  20 Feb 2020 07:00  --------------------------------------------------------  IN: 769 mL / OUT: 1630 mL / NET: -861 mL    20 Feb 2020 07:01  -  20 Feb 2020 16:03  --------------------------------------------------------  IN: 360 mL / OUT: 750 mL / NET: -390 mL      Allergies    No Known Allergies    Intolerances      MEDICATIONS  (STANDING):  aspirin enteric coated 81 milliGRAM(s) Oral daily  atorvastatin 80 milliGRAM(s) Oral at bedtime  chlorhexidine 2% Cloths 1 Application(s) Topical <User Schedule>  clopidogrel Tablet 75 milliGRAM(s) Oral daily  famotidine    Tablet 20 milliGRAM(s) Oral daily  metoprolol tartrate 12.5 milliGRAM(s) Oral every 6 hours  polyethylene glycol 3350 17 Gram(s) Oral every 24 hours  senna 2 Tablet(s) Oral at bedtime    MEDICATIONS  (PRN):  acetaminophen   Tablet .. 975 milliGRAM(s) Oral every 6 hours PRN Mild Pain (1 - 3)        CARDIAC MARKERS ( 19 Feb 2020 04:22 )  x     / x     / 93 U/L / x     / 4.3 ng/mL                            13.5   6.33  )-----------( 105      ( 20 Feb 2020 05:38 )             42.0     02-20    139  |  108  |  21  ----------------------------<  118<H>  3.6   |  22  |  1.02    Ca    8.5      20 Feb 2020 05:38  Phos  3.3     02-20  Mg     2.0     02-20    TPro  5.4<L>  /  Alb  2.8<L>  /  TBili  0.5  /  DBili  x   /  AST  62<H>  /  ALT  45  /  AlkPhos  56  02-20    PT/INR - ( 19 Feb 2020 04:22 )   PT: 12.8 sec;   INR: 1.11 ratio         PTT - ( 20 Feb 2020 13:22 )  PTT:60.6 sec        ASSESSMENT & PLAN:     Patient is a 64 M with hx of AWMI s/p BMS to LAD in 2013, severe ICM s/p ICD (medtronic), transferred from [place] ED, presents with syncope, found to be hypotensive, and VT, s/p DCCV x 1, STEMI, s/p ASA, plavix load, and heparin gtt. Off of pressor support. s/p LHC showing mild-moderate distal disease. s/p VT ablation 2/19.  Pt pending AICD battery change and adjustment tomorrow.       FOR FOLLOW UP:    [ ] f/u with EP regarding AICD battery change and adjustment likely tomorrow NPO after MN  [ ] Switch metoprolol tartrate to succinate on discharge   [ ]Per EP recs-->: continue metoprolol titrate up as B/P permits, Type and screen, NPO after midnight for generator change tomorrow, Hibiclens to chest wall 10 pm tonight and 6 am tomorrow, vancomycin 1 gm on call to be completed on nursing unit prior to going to EP lab for device change    Arabella Pascual  PGY-3 | Internal Medicine  [phone]

## 2020-02-20 NOTE — PROGRESS NOTE ADULT - ASSESSMENT
64 year old male HLD, HTN, CAD AWMI s/p LAD PCI in 2013, ICM s/p Medtronic ICD presented to [facility] s/p syncope & found to be in hemodynamically unstable VT ~190's bpm (below VT detect rate) s/p external DCCV he was loaded with  mg, Plavix 300 mg, heparin gtt and amiodarone gtt was started. He was transferred to [address] for further management. S/p diagnostic cardiac cath 2/18 mild to mod distal disease     1. VT s/p VT ablation 2/19/20  2. Medtronic ICD--reprogrammed VT settings to rate of 182bpm  3. CAD s/p prior PCI   Keep K+>4, MG++>2  continue metoprolol titrate up as B/P permits   right sided ICD soon to externalize, will need  generator change and reposition deep prior to discharge    [id number] 64 year old male HLD, HTN, CAD AWMI s/p LAD PCI in 2013, ICM s/p Medtronic ICD presented to [facility] s/p syncope & found to be in hemodynamically unstable VT ~190's bpm (below VT detect rate) s/p external DCCV he was loaded with  mg, Plavix 300 mg, heparin gtt and amiodarone gtt was started. He was transferred to [address] for further management. S/p diagnostic cardiac cath 2/18 mild to mod distal disease     1. VT s/p VT ablation 2/19/20  2. Medtronic ICD--reprogrammed VT settings to rate of 182bpm  3. CAD s/p prior PCI   Keep K+>4, MG++>2  continue metoprolol titrate up as B/P permits   right sided ICD soon to externalize, will need  generator change and reposition deep prior to discharge, discussed with patient who is agreeable  type /screen   NPO after midnight for generator change tomorrow   discontinue heparin drip   Hibiclens to chest wall  10 pm tonight and 6 am tomorrow   vancomycin 1 gm on call to be completed on nursing unit prior to going to EP lab for device change 64 year old male HLD, HTN, CAD AWMI s/p LAD PCI in 2013, ICM s/p Medtronic ICD presented to [facility] s/p syncope & found to be in hemodynamically unstable VT ~190's bpm (below VT detect rate) s/p external DCCV he was loaded with  mg, Plavix 300 mg, heparin gtt and amiodarone gtt was started. He was transferred to [address] for further management. S/p diagnostic cardiac cath 2/18 mild to mod distal disease     1. VT s/p VT ablation 2/19/20  2. Medtronic ICD--reprogrammed VT settings to rate of 182bpm  3. CAD s/p prior PCI   continue to monitor  telemetry  for VT  Keep K+>4, MG++>2  continue metoprolol titrate up as B/P permits   right sided ICD soon to externalize, will need  generator change and reposition deep prior to discharge, discussed with patient who is agreeable  type /screen   NPO after midnight for generator change tomorrow   discontinue heparin drip   Hibiclens to chest wall  10 pm tonight and 6 am tomorrow   vancomycin 1 gm on call to be completed on nursing unit prior to going to EP lab for device change

## 2020-02-20 NOTE — PROGRESS NOTE ADULT - SUBJECTIVE AND OBJECTIVE BOX
24H hour events:  "I feels better today"    MEDICATIONS:  aspirin enteric coated 81 milliGRAM(s) Oral daily  clopidogrel Tablet 75 milliGRAM(s) Oral daily  heparin  Infusion. 700 Unit(s)/Hr IV Continuous <Continuous>  metoprolol tartrate 12.5 milliGRAM(s) Oral two times a day  famotidine    Tablet 20 milliGRAM(s) Oral daily  polyethylene glycol 3350 17 Gram(s) Oral every 24 hours  senna 2 Tablet(s) Oral at bedtime  atorvastatin 80 milliGRAM(s) Oral at bedtime  chlorhexidine 2% Cloths 1 Application(s) Topical <User Schedule>    REVIEW OF SYSTEMS:  See HPI, otherwise ROS negative.    PHYSICAL EXAM:  T(C): 36.4 (02-20-20 @ 07:00), Max: 37.1 (02-20-20 @ 00:00)  HR: 76 (02-20-20 @ 09:00) (56 - 80)  BP: 128/70 (02-20-20 @ 09:00) (97/61 - 128/70)  RR: 19 (02-20-20 @ 09:00) (11 - 21)  SpO2: 98% (02-20-20 @ 09:00) (96% - 99%)    I&O's Summary    19 Feb 2020 07:01  -  20 Feb 2020 07:00  --------------------------------------------------------  IN: 769 mL / OUT: 1630 mL / NET: -861 mL    Appearance: Alert. NAD	  Cardiovascular: +S1S2 RRR no m/g/r  Respiratory: CTA B/L	  Psychiatry: A & O x 3, Mood & affect appropriate  Gastrointestinal:  Soft, NT. ND. +BS	  Skin: No rashes	  Neurologic: Non-focal  Extremities: No edema BLE  Vascular: Peripheral pulses palpable 2+ bilaterally  LABS:	 	    CBC Full  -  ( 20 Feb 2020 05:38 )  WBC Count : 6.33 K/uL  Hemoglobin : 13.5 g/dL  Hematocrit : 42.0 %  Platelet Count - Automated : 105 K/uL  Mean Cell Volume : 90.9 fl  Mean Cell Hemoglobin : 29.2 pg  Mean Cell Hemoglobin Concentration : 32.1 gm/dL  Auto Neutrophil # : 4.30 K/uL  Auto Lymphocyte # : 1.00 K/uL  Auto Monocyte # : 0.84 K/uL  Auto Eosinophil # : 0.15 K/uL  Auto Basophil # : 0.02 K/uL  Auto Neutrophil % : 67.9 %  Auto Lymphocyte % : 15.8 %  Auto Monocyte % : 13.3 %  Auto Eosinophil % : 2.4 %  Auto Basophil % : 0.3 %    02-20    139  |  108  |  21  ----------------------------<  118<H>  3.6   |  22  |  1.02  02-19    141  |  109<H>  |  20  ----------------------------<  104<H>  4.3   |  22  |  0.95    Ca    8.5      20 Feb 2020 05:38  Ca    8.3<L>      19 Feb 2020 04:22  Phos  3.3     02-20  Phos  3.0     02-19  Mg     2.0     02-20  Mg     2.1     02-19    TPro  5.4<L>  /  Alb  2.8<L>  /  TBili  0.5  /  DBili  x   /  AST  62<H>  /  ALT  45  /  AlkPhos  56  02-20  TPro  5.7<L>  /  Alb  3.1<L>  /  TBili  0.6  /  DBili  x   /  AST  26  /  ALT  23  /  AlkPhos  55  02-19  proBNP: Serum Pro-Brain Natriuretic Peptide: 1278 pg/mL (02-16 @ 21:23)  Serum Pro-Brain Natriuretic Peptide: 1769 pg/mL (02-16 @ 18:09)  TSH:   Thyroid Stimulating Hormone, Serum (02.17.20 @ 00:39)    Thyroid Stimulating Hormone, Serum: 1.82 uIU/mL  proBNP: Serum Pro-Brain Natriuretic Peptide: 1278 pg/mL (02-16 @ 21:23)  Serum Pro-Brain Natriuretic Peptide: 1769 pg/mL (02-16 @ 18:09)    Troponin T, High Sensitivity (02.19.20 @ 04:22)    Troponin T, High Sensitivity Result: 825: Rapid upward or downward changes in high-sensitivity troponin levels  suggest acute myocardial injury. Renal impairment may cause sustained  troponin elevations.  Normal: <6 - 14 ng/L  Indeterminate: 15-51 ng/L  Elevated: > 51 ng/L  See [email or web address]/[email or web address] on the [facility] intranet for more  information ng/L    Troponin T, High Sensitivity (02.17.20 @ 12:26)  Troponin T, High Sensitivity Result: 814:    2/17/20 Troponin T, High Sensitivity Result: 831:  TELEMETRY: SR 60 -70's  	    ECG:  	SR   telemetry        < from: Cardiac Cath Lab - Adult (02.18.20 @ 19:56) >  VENTRICLES: No left ventriculogram was performed.  CORONARY VESSELS: The coronary circulation is right dominant.  LM:   --  LM: Normal.  LAD:   --  Proximal LAD: There was a diffuse 0 % stenosis at the site of a  prior angioplasty with stent placement.  CX:   --  Circumflex: Angiography showed minor luminalirregularities with  no flow limiting lesions.  RCA:   --  RCA: Angiography showed minor luminal irregularities with no  flow limiting lesions.  COMPLICATIONS: There were no complications. 24H hour events:  "I feels better today"    MEDICATIONS:  aspirin enteric coated 81 milliGRAM(s) Oral daily  clopidogrel Tablet 75 milliGRAM(s) Oral daily  heparin  Infusion. 700 Unit(s)/Hr IV Continuous <Continuous>  metoprolol tartrate 12.5 milliGRAM(s) Oral two times a day  famotidine    Tablet 20 milliGRAM(s) Oral daily  polyethylene glycol 3350 17 Gram(s) Oral every 24 hours  senna 2 Tablet(s) Oral at bedtime  atorvastatin 80 milliGRAM(s) Oral at bedtime  chlorhexidine 2% Cloths 1 Application(s) Topical <User Schedule>    REVIEW OF SYSTEMS:  See HPI, otherwise ROS negative.    PHYSICAL EXAM:  T(C): 36.4 (02-20-20 @ 07:00), Max: 37.1 (02-20-20 @ 00:00)  HR: 76 (02-20-20 @ 09:00) (56 - 80)  BP: 128/70 (02-20-20 @ 09:00) (97/61 - 128/70)  RR: 19 (02-20-20 @ 09:00) (11 - 21)  SpO2: 98% (02-20-20 @ 09:00) (96% - 99%)    I&O's Summary    19 Feb 2020 07:01  -  20 Feb 2020 07:00  --------------------------------------------------------  IN: 769 mL / OUT: 1630 mL / NET: -861 mL    Appearance: Alert. NAD	  Cardiovascular: +S1S2 RRR no m/g/r  Respiratory: CTA B/L	  Psychiatry: A & O x 3, Mood & affect appropriate  Gastrointestinal:  Soft, NT. ND. +BS	  Skin: bilateral groins no hematoma, flat, no ecchymosis	  Neurologic: Non-focal  Extremities: No edema BLE  Vascular: Peripheral pulses palpable 2+ bilaterally  LABS:	 	    CBC Full  -  ( 20 Feb 2020 05:38 )  WBC Count : 6.33 K/uL  Hemoglobin : 13.5 g/dL  Hematocrit : 42.0 %  Platelet Count - Automated : 105 K/uL  Mean Cell Volume : 90.9 fl  Mean Cell Hemoglobin : 29.2 pg  Mean Cell Hemoglobin Concentration : 32.1 gm/dL  Auto Neutrophil # : 4.30 K/uL  Auto Lymphocyte # : 1.00 K/uL  Auto Monocyte # : 0.84 K/uL  Auto Eosinophil # : 0.15 K/uL  Auto Basophil # : 0.02 K/uL  Auto Neutrophil % : 67.9 %  Auto Lymphocyte % : 15.8 %  Auto Monocyte % : 13.3 %  Auto Eosinophil % : 2.4 %  Auto Basophil % : 0.3 %    02-20    139  |  108  |  21  ----------------------------<  118<H>  3.6   |  22  |  1.02  02-19    141  |  109<H>  |  20  ----------------------------<  104<H>  4.3   |  22  |  0.95    Ca    8.5      20 Feb 2020 05:38  Ca    8.3<L>      19 Feb 2020 04:22  Phos  3.3     02-20  Phos  3.0     02-19  Mg     2.0     02-20  Mg     2.1     02-19    TPro  5.4<L>  /  Alb  2.8<L>  /  TBili  0.5  /  DBili  x   /  AST  62<H>  /  ALT  45  /  AlkPhos  56  02-20  TPro  5.7<L>  /  Alb  3.1<L>  /  TBili  0.6  /  DBili  x   /  AST  26  /  ALT  23  /  AlkPhos  55  02-19  proBNP: Serum Pro-Brain Natriuretic Peptide: 1278 pg/mL (02-16 @ 21:23)  Serum Pro-Brain Natriuretic Peptide: 1769 pg/mL (02-16 @ 18:09)  TSH:   Thyroid Stimulating Hormone, Serum (02.17.20 @ 00:39)    Thyroid Stimulating Hormone, Serum: 1.82 uIU/mL  proBNP: Serum Pro-Brain Natriuretic Peptide: 1278 pg/mL (02-16 @ 21:23)  Serum Pro-Brain Natriuretic Peptide: 1769 pg/mL (02-16 @ 18:09)    Troponin T, High Sensitivity (02.19.20 @ 04:22)    Troponin T, High Sensitivity Result: 825: Rapid upward or downward changes in high-sensitivity troponin levels  suggest acute myocardial injury. Renal impairment may cause sustained  troponin elevations.  Normal: <6 - 14 ng/L  Indeterminate: 15-51 ng/L  Elevated: > 51 ng/L  See [email or web address] on the [facility] intranet for more  information ng/L    Troponin T, High Sensitivity (02.17.20 @ 12:26)  Troponin T, High Sensitivity Result: 814:    2/17/20 Troponin T, High Sensitivity Result: 831:  TELEMETRY: SR 60 -70's  	    ECG:  SR60's 2   episodes   9 bts 10 bts  NSVT, 1 episode of PAT lasting few seconds    < from: Cardiac Cath Lab - Adult (02.18.20 @ 19:56) >  VENTRICLES: No left ventriculogram was performed.  CORONARY VESSELS: The coronary circulation is right dominant.  LM:   --  LM: Normal.  LAD:   --  Proximal LAD: There was a diffuse 0 % stenosis at the site of a  prior angioplasty with stent placement.  CX:   --  Circumflex: Angiography showed minor luminalirregularities with  no flow limiting lesions.  RCA:   --  RCA: Angiography showed minor luminal irregularities with no  flow limiting lesions.  COMPLICATIONS: There were no complications.

## 2020-02-20 NOTE — CHART NOTE - NSCHARTNOTEFT_GEN_A_CORE
CCU Transfer Note    Transfer from: CCU    Transfer to: (X  ) Medicine    (X  ) Telemetry    (  ) RCU                               (  ) Palliative    (  ) Stroke Unit    (  ) MICU    (  ) __________________    Accepting Physician:    Signout given to:     HPI / CCU COURSE:  64 male with PMHx of MI (2013) w/ BMSx1, ICM, AICD, HTN, HLD, presented to [place] ED, with c/o dizziness, felt like blacking out, that started around 3pm this afternoon while at work, felt better after sitting for 1 hour.  He finished his shift at 1600 he felt chest tightness that lasted for 5 mins, he then drove home at around 1700, where he endorsed persistent chest discomfort that prompt him to drive to [place] ED. In ED triage, he has worsening chest tightnest, felt nauseous, dry heaving, diaphoretic, found to be hypotensive SBP 50's-70's was started on levo gtt, with VT 's, He was given amio 150mg x 1, s/p externally defib with 200J x 1 and returned to SR. EKG post DCCV shows SR with ST elevation in leads V1-V4, he was loaded with  mg, plavix 300 mg, heparin gtt and amiodarone gtt was started. He was transferred to [facility] for further management.  In CCU: he was continued on levophed gtt for pressor support, SBP: 100's MAP: 60's, amiodarone gtt 1 mg /min for VT, and hep gtt. Right radial A line and RIJ TLC was inserted, confirmed with CXR for placement. EKG with ST elevations in leads V1-V4, Trop T: 413. NSR 60's- 70's. Denies any chest discomfort, dizziness, n/v.      CCU Course:  Patient was admitted for concern for STEMI and Monomorphic VT. For STEMI patient on prior EKG had ST elevations V1-V4, more pronounced this time so was on heparin gtt. Also on amio gtt initially due to VT but as per EP was discontinued and adjusted monitor zone to lower level to 182 BPM so it can be ATP'd. Patient had LHC on 2/18 which showed mild-moderate distal vessel disease but no interventions necessary. Troponins peaked to 831 likely all in the setting of type 2 NSTEMI. Had VT ablation 2/19. Had one episode of 10 beats of NSVT overnight otherwise no further episodes. Put back on heparin gtt due to concern for stunned myocardium with recent VT ablation theoretical risk for LV thrombus formation. Hemodynamically stable. Metoprolol increased to 12.5mg Q6H. Course complicated by right wrist pain and swelling at A-line site, bedside sonogram showed no clot formation and good blood flow with doppler.           Vital Signs Last 24 Hrs  T(C): 36.4 (20 Feb 2020 07:00), Max: 37.1 (20 Feb 2020 00:00)  T(F): 97.5 (20 Feb 2020 07:00), Max: 98.7 (20 Feb 2020 00:00)  HR: 76 (20 Feb 2020 10:00) (60 - 80)  BP: 121/73 (20 Feb 2020 10:00) (97/61 - 128/70)  BP(mean): 86 (20 Feb 2020 10:00) (69 - 96)  RR: 17 (20 Feb 2020 10:00) (11 - 21)  SpO2: 97% (20 Feb 2020 10:00) (96% - 99%)    I&O's Summary    19 Feb 2020 07:01  -  20 Feb 2020 07:00  --------------------------------------------------------  IN: 769 mL / OUT: 1630 mL / NET: -861 mL    20 Feb 2020 07:01  -  20 Feb 2020 12:06  --------------------------------------------------------  IN: 0 mL / OUT: 500 mL / NET: -500 mL      PHYSICAL EXAMINATION:  General: WN/WD NAD  HEENT: PERRLA, EOMI, moist mucous membranes  Neurology: A&Ox3, nonfocal, MARTIN x 4  Respiratory: CTA B/L, normal respiratory effort, no wheezes, crackles, rales has a right sided AICD C/D/I no signs of hematoma   CV: RRR, S1S2, no murmurs, rubs or gallops no JVD no LE edema  Abdominal: Soft, NT, ND +BS  MSK: tenderness at A-line site (A-line removed) right wrist with no erythema, swelling, good pulses and good strength of hand   Extremities: No edema, 2+ peripheral pulses in UE and LE bilaterally       LABS:   CARDIAC MARKERS ( 19 Feb 2020 04:22 )  x     / x     / 93 U/L / x     / 4.3 ng/mL                              13.5   6.33  )-----------( 105      ( 20 Feb 2020 05:38 )             42.0       02-20    139  |  108  |  21  ----------------------------<  118<H>  3.6   |  22  |  1.02    Ca    8.5      20 Feb 2020 05:38  Phos  3.3     02-20  Mg     2.0     02-20    TPro  5.4<L>  /  Alb  2.8<L>  /  TBili  0.5  /  DBili  x   /  AST  62<H>  /  ALT  45  /  AlkPhos  56  02-20      PT/INR - ( 19 Feb 2020 04:22 )   PT: 12.8 sec;   INR: 1.11 ratio         PTT - ( 20 Feb 2020 05:38 )  PTT:42.0 sec        ASSESSMENT & PLAN:   64 M with hx of AWMI s/p BMS to LAD in 2013, severe ICM s/p ICD (medtronic), transferred from [place] ED, presents with syncope, found to be hypotensive, and VT, s/p DCCV x 1, ST elevations V1-V4 (more pronounced than prior EKGs), s/p ASA, plavix load, and heparin gtt in the setting of type 2 NSTEMI. s/p LHC showing mild-moderate distal vessel disease. s/p VT ablation. Now pending AICD battery change and adjustment.           FOR FOLLOW UP:  [ ] f/u with EP regarding AICD battery change and adjustment  [ ] EP recs when to discontinue heparin gtt  [ ] Switch metoprolol tartrate to succinate on discharge     Omar Murphy MD  Internal Medicine PGY-2  CCU Resident CCU Transfer Note    Transfer from: CCU    Transfer to: (X  ) Medicine    (X  ) Telemetry    (  ) RCU                               (  ) Palliative    (  ) Stroke Unit    (  ) MICU    (  ) __________________    Accepting Physician:    Signout given to:     HPI / CCU COURSE:  64 male with PMHx of MI (2013) w/ BMSx1, ICM, AICD, HTN, HLD, presented to [place] ED, with c/o dizziness, felt like blacking out, that started around 3pm this afternoon while at work, felt better after sitting for 1 hour.  He finished his shift at 1600 he felt chest tightness that lasted for 5 mins, he then drove home at around 1700, where he endorsed persistent chest discomfort that prompt him to drive to [place] ED. In ED triage, he has worsening chest tightnest, felt nauseous, dry heaving, diaphoretic, found to be hypotensive SBP 50's-70's was started on levo gtt, with VT 's, He was given amio 150mg x 1, s/p externally defib with 200J x 1 and returned to SR. EKG post DCCV shows SR with ST elevation in leads V1-V4, he was loaded with  mg, plavix 300 mg, heparin gtt and amiodarone gtt was started. He was transferred to [facility] for further management.  In CCU: he was continued on levophed gtt for pressor support, SBP: 100's MAP: 60's, amiodarone gtt 1 mg /min for VT, and hep gtt. Right radial A line and RIJ TLC was inserted, confirmed with CXR for placement. EKG with ST elevations in leads V1-V4, Trop T: 413. NSR 60's- 70's. Denies any chest discomfort, dizziness, n/v.      CCU Course:  Patient was admitted for concern for STEMI and Monomorphic VT. For STEMI patient on prior EKG had ST elevations V1-V4, more pronounced this time so was on heparin gtt. Also on amio gtt initially due to VT but as per EP was discontinued and adjusted monitor zone to lower level to 182 BPM so it can be ATP'd. Patient had LHC on 2/18 which showed mild-moderate distal vessel disease but no interventions necessary. Troponins peaked to 831 likely all in the setting of type 2 NSTEMI. Had VT ablation 2/19. Had one episode of 10 beats of NSVT overnight otherwise no further episodes. Put back on heparin gtt due to concern for stunned myocardium with recent VT ablation theoretical risk for LV thrombus formation. Hemodynamically stable. Metoprolol increased to 12.5mg Q6H. Course complicated by right wrist pain and swelling at A-line site, bedside sonogram showed no clot formation and good blood flow with doppler.           Vital Signs Last 24 Hrs  T(C): 36.4 (20 Feb 2020 07:00), Max: 37.1 (20 Feb 2020 00:00)  T(F): 97.5 (20 Feb 2020 07:00), Max: 98.7 (20 Feb 2020 00:00)  HR: 76 (20 Feb 2020 10:00) (60 - 80)  BP: 121/73 (20 Feb 2020 10:00) (97/61 - 128/70)  BP(mean): 86 (20 Feb 2020 10:00) (69 - 96)  RR: 17 (20 Feb 2020 10:00) (11 - 21)  SpO2: 97% (20 Feb 2020 10:00) (96% - 99%)    I&O's Summary    19 Feb 2020 07:01  -  20 Feb 2020 07:00  --------------------------------------------------------  IN: 769 mL / OUT: 1630 mL / NET: -861 mL    20 Feb 2020 07:01  -  20 Feb 2020 12:06  --------------------------------------------------------  IN: 0 mL / OUT: 500 mL / NET: -500 mL      PHYSICAL EXAMINATION:  General: WN/WD NAD  HEENT: PERRLA, EOMI, moist mucous membranes  Neurology: A&Ox3, nonfocal, MARTIN x 4  Respiratory: CTA B/L, normal respiratory effort, no wheezes, crackles, rales has a right sided AICD C/D/I no signs of hematoma   CV: RRR, S1S2, no murmurs, rubs or gallops no JVD no LE edema  Abdominal: Soft, NT, ND +BS  MSK: tenderness at A-line site (A-line removed) right wrist with no erythema, swelling, good pulses and good strength of hand   Extremities: No edema, 2+ peripheral pulses in UE and LE bilaterally       LABS:   CARDIAC MARKERS ( 19 Feb 2020 04:22 )  x     / x     / 93 U/L / x     / 4.3 ng/mL                              13.5   6.33  )-----------( 105      ( 20 Feb 2020 05:38 )             42.0       02-20    139  |  108  |  21  ----------------------------<  118<H>  3.6   |  22  |  1.02    Ca    8.5      20 Feb 2020 05:38  Phos  3.3     02-20  Mg     2.0     02-20    TPro  5.4<L>  /  Alb  2.8<L>  /  TBili  0.5  /  DBili  x   /  AST  62<H>  /  ALT  45  /  AlkPhos  56  02-20      PT/INR - ( 19 Feb 2020 04:22 )   PT: 12.8 sec;   INR: 1.11 ratio         PTT - ( 20 Feb 2020 05:38 )  PTT:42.0 sec        ASSESSMENT & PLAN:   64 M with hx of AWMI s/p BMS to LAD in 2013, severe ICM s/p ICD (medtronic), transferred from [place] ED, presents with syncope, found to be hypotensive, and VT, s/p DCCV x 1, ST elevations V1-V4 (more pronounced than prior EKGs), s/p ASA, plavix load, and heparin gtt in the setting of type 2 NSTEMI. s/p LHC showing mild-moderate distal vessel disease. s/p VT ablation. Now pending AICD battery change and adjustment.           FOR FOLLOW UP:  [ ] f/u with EP regarding AICD battery change and adjustment likely tomorrow NPO after MN  [ ] Switch metoprolol tartrate to succinate on discharge     Omar Murphy MD  Internal Medicine PGY-2  CCU Resident CCU Transfer Note    Transfer from: CCU    Transfer to: (X  ) Medicine    (X  ) Telemetry    (  ) RCU                               (  ) Palliative    (  ) Stroke Unit    (  ) MICU    (  ) __________________    Accepting Physician: Dr. Ruth Condon    Signout given to: April NP     HPI / CCU COURSE:  64 male with PMHx of MI (2013) w/ BMSx1, ICM, AICD, HTN, HLD, presented to [place] ED, with c/o dizziness, felt like blacking out, that started around 3pm this afternoon while at work, felt better after sitting for 1 hour.  He finished his shift at 1600 he felt chest tightness that lasted for 5 mins, he then drove home at around 1700, where he endorsed persistent chest discomfort that prompt him to drive to [place] ED. In ED triage, he has worsening chest tightnest, felt nauseous, dry heaving, diaphoretic, found to be hypotensive SBP 50's-70's was started on levo gtt, with VT 's, He was given amio 150mg x 1, s/p externally defib with 200J x 1 and returned to SR. EKG post DCCV shows SR with ST elevation in leads V1-V4, he was loaded with  mg, plavix 300 mg, heparin gtt and amiodarone gtt was started. He was transferred to [facility] for further management.  In CCU: he was continued on levophed gtt for pressor support, SBP: 100's MAP: 60's, amiodarone gtt 1 mg /min for VT, and hep gtt. Right radial A line and RIJ TLC was inserted, confirmed with CXR for placement. EKG with ST elevations in leads V1-V4, Trop T: 413. NSR 60's- 70's. Denies any chest discomfort, dizziness, n/v.      CCU Course:  Patient was admitted for concern for STEMI and Monomorphic VT. For STEMI patient on prior EKG had ST elevations V1-V4, more pronounced this time so was on heparin gtt. Also on amio gtt initially due to VT but as per EP was discontinued and adjusted monitor zone to lower level to 182 BPM so it can be ATP'd. Patient had LHC on 2/18 which showed mild-moderate distal vessel disease but no interventions necessary. Troponins peaked to 831 likely all in the setting of type 2 NSTEMI. Had VT ablation 2/19. Had one episode of 10 beats of NSVT overnight otherwise no further episodes. Put back on heparin gtt due to concern for stunned myocardium with recent VT ablation theoretical risk for LV thrombus formation. Hemodynamically stable. Metoprolol increased to 12.5mg Q6H. Course complicated by right wrist pain and swelling at A-line site, bedside sonogram showed no clot formation and good blood flow with doppler.           Vital Signs Last 24 Hrs  T(C): 36.4 (20 Feb 2020 07:00), Max: 37.1 (20 Feb 2020 00:00)  T(F): 97.5 (20 Feb 2020 07:00), Max: 98.7 (20 Feb 2020 00:00)  HR: 76 (20 Feb 2020 10:00) (60 - 80)  BP: 121/73 (20 Feb 2020 10:00) (97/61 - 128/70)  BP(mean): 86 (20 Feb 2020 10:00) (69 - 96)  RR: 17 (20 Feb 2020 10:00) (11 - 21)  SpO2: 97% (20 Feb 2020 10:00) (96% - 99%)    I&O's Summary    19 Feb 2020 07:01  -  20 Feb 2020 07:00  --------------------------------------------------------  IN: 769 mL / OUT: 1630 mL / NET: -861 mL    20 Feb 2020 07:01  -  20 Feb 2020 12:06  --------------------------------------------------------  IN: 0 mL / OUT: 500 mL / NET: -500 mL      PHYSICAL EXAMINATION:  General: WN/WD NAD  HEENT: PERRLA, EOMI, moist mucous membranes  Neurology: A&Ox3, nonfocal, MARTIN x 4  Respiratory: CTA B/L, normal respiratory effort, no wheezes, crackles, rales has a right sided AICD C/D/I no signs of hematoma   CV: RRR, S1S2, no murmurs, rubs or gallops no JVD no LE edema  Abdominal: Soft, NT, ND +BS  MSK: tenderness at A-line site (A-line removed) right wrist with no erythema, swelling, good pulses and good strength of hand   Extremities: No edema, 2+ peripheral pulses in UE and LE bilaterally       LABS:   CARDIAC MARKERS ( 19 Feb 2020 04:22 )  x     / x     / 93 U/L / x     / 4.3 ng/mL                              13.5   6.33  )-----------( 105      ( 20 Feb 2020 05:38 )             42.0       02-20    139  |  108  |  21  ----------------------------<  118<H>  3.6   |  22  |  1.02    Ca    8.5      20 Feb 2020 05:38  Phos  3.3     02-20  Mg     2.0     02-20    TPro  5.4<L>  /  Alb  2.8<L>  /  TBili  0.5  /  DBili  x   /  AST  62<H>  /  ALT  45  /  AlkPhos  56  02-20      PT/INR - ( 19 Feb 2020 04:22 )   PT: 12.8 sec;   INR: 1.11 ratio         PTT - ( 20 Feb 2020 05:38 )  PTT:42.0 sec        ASSESSMENT & PLAN:   64 M with hx of AWMI s/p BMS to LAD in 2013, severe ICM s/p ICD (medtronic), transferred from [place] ED, presents with syncope, found to be hypotensive, and VT, s/p DCCV x 1, ST elevations V1-V4 (more pronounced than prior EKGs), s/p ASA, plavix load, and heparin gtt in the setting of type 2 NSTEMI. s/p LHC showing mild-moderate distal vessel disease. s/p VT ablation. Now pending AICD battery change and adjustment.           FOR FOLLOW UP:  [ ] f/u with EP regarding AICD battery change and adjustment likely tomorrow NPO after MN  [ ] Switch metoprolol tartrate to succinate on discharge     Omar Murphy MD  Internal Medicine PGY-2  CCU Resident

## 2020-02-20 NOTE — DIETITIAN INITIAL EVALUATION ADULT. - OTHER INFO
Pt reports good appetite/intake in-house. Admits to eating a "very healthy" diet since previous MI in 2013. Diet recall confirms pt with balanced diet that includes sources of lean proteins and many fresh vegetables. NKFA. Endorses PTA micronutrient supplementation includes vitamin D. Reports UBW of 153-156 pounds and denies recent weight changes. Current weight in-house noted as 155 pounds (2/19), which is consistent with reported UBW. Pt visually appears well-nourished. RD reviewed components of DASH/TLC diet and discussed heart healthy nutrition therapy, emphasizing sources of fiber to include & sources of high-sodium foods to continue to limit/avoid. RD provided and reviewed the following written handouts: Heart Healthy Reduced Sodium Nutrition Therapy, Heart Healthy Fiber Tips. Pt was receptive to information & verbalized understanding. RD availability made known.     Skin per chart: free of pressure injuries  Edema per chart: 1+ right wrist    Ht: 65 inches, Wt: 155 pounds (2/19), BMI: 26.9 kg/m2, IBW: 136 pounds (+/-10%), %IBW: 113%

## 2020-02-21 LAB
ALBUMIN SERPL ELPH-MCNC: 2.8 G/DL — LOW (ref 3.3–5)
ALP SERPL-CCNC: 55 U/L — SIGNIFICANT CHANGE UP (ref 40–120)
ALT FLD-CCNC: 43 U/L — SIGNIFICANT CHANGE UP (ref 10–45)
ANION GAP SERPL CALC-SCNC: 10 MMOL/L — SIGNIFICANT CHANGE UP (ref 5–17)
APTT BLD: 27.8 SEC — SIGNIFICANT CHANGE UP (ref 27.5–36.3)
AST SERPL-CCNC: 38 U/L — SIGNIFICANT CHANGE UP (ref 10–40)
BASOPHILS # BLD AUTO: 0.03 K/UL — SIGNIFICANT CHANGE UP (ref 0–0.2)
BASOPHILS NFR BLD AUTO: 0.5 % — SIGNIFICANT CHANGE UP (ref 0–2)
BILIRUB SERPL-MCNC: 0.7 MG/DL — SIGNIFICANT CHANGE UP (ref 0.2–1.2)
BUN SERPL-MCNC: 17 MG/DL — SIGNIFICANT CHANGE UP (ref 7–23)
CALCIUM SERPL-MCNC: 8.6 MG/DL — SIGNIFICANT CHANGE UP (ref 8.4–10.5)
CHLORIDE SERPL-SCNC: 108 MMOL/L — SIGNIFICANT CHANGE UP (ref 96–108)
CO2 SERPL-SCNC: 23 MMOL/L — SIGNIFICANT CHANGE UP (ref 22–31)
CREAT SERPL-MCNC: 0.99 MG/DL — SIGNIFICANT CHANGE UP (ref 0.5–1.3)
EOSINOPHIL # BLD AUTO: 0.19 K/UL — SIGNIFICANT CHANGE UP (ref 0–0.5)
EOSINOPHIL NFR BLD AUTO: 3.2 % — SIGNIFICANT CHANGE UP (ref 0–6)
GLUCOSE SERPL-MCNC: 84 MG/DL — SIGNIFICANT CHANGE UP (ref 70–99)
HCT VFR BLD CALC: 43.6 % — SIGNIFICANT CHANGE UP (ref 39–50)
HGB BLD-MCNC: 14 G/DL — SIGNIFICANT CHANGE UP (ref 13–17)
IMM GRANULOCYTES NFR BLD AUTO: 0.3 % — SIGNIFICANT CHANGE UP (ref 0–1.5)
INR BLD: 1.11 RATIO — SIGNIFICANT CHANGE UP (ref 0.88–1.16)
LYMPHOCYTES # BLD AUTO: 1.05 K/UL — SIGNIFICANT CHANGE UP (ref 1–3.3)
LYMPHOCYTES # BLD AUTO: 17.6 % — SIGNIFICANT CHANGE UP (ref 13–44)
MAGNESIUM SERPL-MCNC: 1.9 MG/DL — SIGNIFICANT CHANGE UP (ref 1.6–2.6)
MCHC RBC-ENTMCNC: 28.7 PG — SIGNIFICANT CHANGE UP (ref 27–34)
MCHC RBC-ENTMCNC: 32.1 GM/DL — SIGNIFICANT CHANGE UP (ref 32–36)
MCV RBC AUTO: 89.5 FL — SIGNIFICANT CHANGE UP (ref 80–100)
MONOCYTES # BLD AUTO: 0.78 K/UL — SIGNIFICANT CHANGE UP (ref 0–0.9)
MONOCYTES NFR BLD AUTO: 13.1 % — SIGNIFICANT CHANGE UP (ref 2–14)
NEUTROPHILS # BLD AUTO: 3.88 K/UL — SIGNIFICANT CHANGE UP (ref 1.8–7.4)
NEUTROPHILS NFR BLD AUTO: 65.3 % — SIGNIFICANT CHANGE UP (ref 43–77)
NRBC # BLD: 0 /100 WBCS — SIGNIFICANT CHANGE UP (ref 0–0)
PHOSPHATE SERPL-MCNC: 3.2 MG/DL — SIGNIFICANT CHANGE UP (ref 2.5–4.5)
PLATELET # BLD AUTO: 100 K/UL — LOW (ref 150–400)
POTASSIUM SERPL-MCNC: 4.1 MMOL/L — SIGNIFICANT CHANGE UP (ref 3.5–5.3)
POTASSIUM SERPL-SCNC: 4.1 MMOL/L — SIGNIFICANT CHANGE UP (ref 3.5–5.3)
PROT SERPL-MCNC: 5.9 G/DL — LOW (ref 6–8.3)
PROTHROM AB SERPL-ACNC: 12.8 SEC — SIGNIFICANT CHANGE UP (ref 10–12.9)
RBC # BLD: 4.87 M/UL — SIGNIFICANT CHANGE UP (ref 4.2–5.8)
RBC # FLD: 14.6 % — HIGH (ref 10.3–14.5)
SODIUM SERPL-SCNC: 141 MMOL/L — SIGNIFICANT CHANGE UP (ref 135–145)
WBC # BLD: 5.95 K/UL — SIGNIFICANT CHANGE UP (ref 3.8–10.5)
WBC # FLD AUTO: 5.95 K/UL — SIGNIFICANT CHANGE UP (ref 3.8–10.5)

## 2020-02-21 PROCEDURE — 93010 ELECTROCARDIOGRAM REPORT: CPT

## 2020-02-21 PROCEDURE — 71045 X-RAY EXAM CHEST 1 VIEW: CPT | Mod: 26

## 2020-02-21 PROCEDURE — 99233 SBSQ HOSP IP/OBS HIGH 50: CPT

## 2020-02-21 PROCEDURE — 33262 RMVL& REPLC PULSE GEN 1 LEAD: CPT

## 2020-02-21 RX ORDER — VANCOMYCIN HCL 1 G
1000 VIAL (EA) INTRAVENOUS ONCE
Refills: 0 | Status: COMPLETED | OUTPATIENT
Start: 2020-02-21 | End: 2020-02-21

## 2020-02-21 RX ADMIN — SENNA PLUS 2 TABLET(S): 8.6 TABLET ORAL at 21:33

## 2020-02-21 RX ADMIN — FAMOTIDINE 20 MILLIGRAM(S): 10 INJECTION INTRAVENOUS at 10:01

## 2020-02-21 RX ADMIN — POLYETHYLENE GLYCOL 3350 17 GRAM(S): 17 POWDER, FOR SOLUTION ORAL at 21:34

## 2020-02-21 RX ADMIN — Medication 12.5 MILLIGRAM(S): at 21:33

## 2020-02-21 RX ADMIN — Medication 975 MILLIGRAM(S): at 10:01

## 2020-02-21 RX ADMIN — CLOPIDOGREL BISULFATE 75 MILLIGRAM(S): 75 TABLET, FILM COATED ORAL at 10:01

## 2020-02-21 RX ADMIN — Medication 12.5 MILLIGRAM(S): at 00:36

## 2020-02-21 RX ADMIN — Medication 250 MILLIGRAM(S): at 23:58

## 2020-02-21 RX ADMIN — Medication 975 MILLIGRAM(S): at 17:30

## 2020-02-21 RX ADMIN — ATORVASTATIN CALCIUM 80 MILLIGRAM(S): 80 TABLET, FILM COATED ORAL at 21:33

## 2020-02-21 RX ADMIN — Medication 975 MILLIGRAM(S): at 16:18

## 2020-02-21 RX ADMIN — Medication 81 MILLIGRAM(S): at 10:01

## 2020-02-21 RX ADMIN — CHLORHEXIDINE GLUCONATE 1 APPLICATION(S): 213 SOLUTION TOPICAL at 05:07

## 2020-02-21 RX ADMIN — Medication 250 MILLIGRAM(S): at 11:29

## 2020-02-21 RX ADMIN — Medication 12.5 MILLIGRAM(S): at 13:45

## 2020-02-21 RX ADMIN — Medication 12.5 MILLIGRAM(S): at 05:07

## 2020-02-21 NOTE — PROGRESS NOTE ADULT - SUBJECTIVE AND OBJECTIVE BOX
Patient is a 64y old  Male who presents with a chief complaint of chest pain (21 Feb 2020 14:14)        SUBJECTIVE / OVERNIGHT EVENTS: Patient reports slight discomfort in area of ICD in right upper chest, but otherwise no SOB and feels well.       MEDICATIONS  (STANDING):  aspirin enteric coated 81 milliGRAM(s) Oral daily  atorvastatin 80 milliGRAM(s) Oral at bedtime  chlorhexidine 2% Cloths 1 Application(s) Topical <User Schedule>  chlorhexidine 4% Liquid 1 Application(s) Topical <User Schedule>  clopidogrel Tablet 75 milliGRAM(s) Oral daily  famotidine    Tablet 20 milliGRAM(s) Oral daily  metoprolol tartrate 12.5 milliGRAM(s) Oral every 6 hours  polyethylene glycol 3350 17 Gram(s) Oral every 24 hours  senna 2 Tablet(s) Oral at bedtime    MEDICATIONS  (PRN):  acetaminophen   Tablet .. 975 milliGRAM(s) Oral every 6 hours PRN Mild Pain (1 - 3)      Vital Signs Last 24 Hrs  T(C): 36.7 (22 Feb 2020 04:00), Max: 36.9 (21 Feb 2020 18:28)  T(F): 98 (22 Feb 2020 04:00), Max: 98.4 (21 Feb 2020 18:28)  HR: 78 (22 Feb 2020 04:00) (75 - 82)  BP: 136/84 (22 Feb 2020 04:00) (107/64 - 136/84)  BP(mean): --  RR: 18 (22 Feb 2020 04:00) (18 - 18)  SpO2: 97% (22 Feb 2020 04:00) (97% - 100%)  CAPILLARY BLOOD GLUCOSE        I&O's Summary    20 Feb 2020 07:01  -  21 Feb 2020 07:00  --------------------------------------------------------  IN: 680 mL / OUT: 1550 mL / NET: -870 mL    21 Feb 2020 07:01  -  22 Feb 2020 06:00  --------------------------------------------------------  IN: 470 mL / OUT: 400 mL / NET: 70 mL          PHYSICAL EXAM:   GENERAL: NAD, well-developed  HEAD:  Atraumatic, Normocephalic  EYES: Conjunctiva and sclera clear  NECK: Supple  CHEST/LUNG: Clear to auscultation bilaterally; No wheeze  HEART: S1S2 normal. Regular rate and rhythm; No murmurs, rubs, or gallops  ABDOMEN: Soft, Nontender, Nondistended; Bowel sounds present  EXTREMITIES: No clubbing, cyanosis, or edema  PSYCH/Neuro: AAOx3. Non-focal.   SKIN: Right upper chest wall ICD site intact.       LABS:                        14.0   5.95  )-----------( 100      ( 21 Feb 2020 07:15 )             43.6     02-21    141  |  108  |  17  ----------------------------<  84  4.1   |  23  |  0.99    Ca    8.6      21 Feb 2020 07:11  Phos  3.2     02-21  Mg     1.9     02-21    TPro  5.9<L>  /  Alb  2.8<L>  /  TBili  0.7  /  DBili  x   /  AST  38  /  ALT  43  /  AlkPhos  55  02-21    PT/INR - ( 21 Feb 2020 07:15 )   PT: 12.8 sec;   INR: 1.11 ratio         PTT - ( 21 Feb 2020 07:15 )  PTT:27.8 sec      < from: Xray Chest 1 View AP/PA (02.21.20 @ 13:52) >  IMPRESSION:   Clear lungs.     < end of copied text >    Telemetry reviewed by me: Sinus 60-90's. PVC's.     RADIOLOGY & ADDITIONAL TESTS:    Imaging Personally Reviewed: CXR report.   Consultant(s) Notes Reviewed:  EPS  Care Discussed with Consultants/Other Providers: EPS

## 2020-02-21 NOTE — PROVIDER CONTACT NOTE (CHANGE IN STATUS NOTIFICATION) - ASSESSMENT
pt AICD dressing w/ new bleeding on it. pt dressing was dry upon arrival to floor. temp 98.4, HR 80, /64, pO2 98% on RA

## 2020-02-21 NOTE — CHART NOTE - NSCHARTNOTEFT_GEN_A_CORE
Notified by RN of R chest well swelling and slight oozing at PPM site; c/o mild tenderness on palpation; EPS team examined pt ;   Plan  cont to monitor  Tylenol for pain

## 2020-02-21 NOTE — PROGRESS NOTE ADULT - ASSESSMENT
64 year old M with hx of AWMI s/p BMS to LAD in 2013, severe ICM s/p ICD (medtronic); transferred from [place] ED, presented with syncope, found to be hypotensive, and VT, s/p DCCV x 1, NSTEMI, s/p ASA, plavix load, and heparin gtt. Off of pressor support. s/p LHC showing mild-moderate distal disease. s/p VT ablation 2/19.  ICD battery change on 2/21.

## 2020-02-21 NOTE — PROGRESS NOTE ADULT - SUBJECTIVE AND OBJECTIVE BOX
24H hour events: Patient without complaints. Tele without complaints    MEDICATIONS:  aspirin enteric coated 81 milliGRAM(s) Oral daily  clopidogrel Tablet 75 milliGRAM(s) Oral daily  metoprolol tartrate 12.5 milliGRAM(s) Oral every 6 hours  vancomycin  IVPB 1000 milliGRAM(s) IV Intermittent once  acetaminophen   Tablet .. 975 milliGRAM(s) Oral every 6 hours PRN  famotidine    Tablet 20 milliGRAM(s) Oral daily  polyethylene glycol 3350 17 Gram(s) Oral every 24 hours  senna 2 Tablet(s) Oral at bedtime  atorvastatin 80 milliGRAM(s) Oral at bedtime  chlorhexidine 2% Cloths 1 Application(s) Topical <User Schedule>  chlorhexidine 4% Liquid 1 Application(s) Topical <User Schedule>      REVIEW OF SYSTEMS:  See HPI, otherwise ROS negative.    PHYSICAL EXAM:  T(C): 36.8 (02-21-20 @ 04:15), Max: 36.8 (02-20-20 @ 14:00)  HR: 76 (02-21-20 @ 04:15) (75 - 84)  BP: 130/77 (02-21-20 @ 04:15) (102/61 - 131/77)  RR: 18 (02-21-20 @ 04:15) (14 - 21)  SpO2: 96% (02-21-20 @ 04:15) (96% - 98%)  Wt(kg): --  I&O's Summary    20 Feb 2020 07:01  -  21 Feb 2020 07:00  --------------------------------------------------------  IN: 680 mL / OUT: 1550 mL / NET: -870 mL        Appearance: Alert. NAD	  Cardiovascular: +S1S2 RRR no m/g/r  Respiratory: CTA B/L	  Psychiatry: A & O x 3, Mood & affect appropriate  Neurologic: Non-focal  Extremities: No edema BLE  Vascular: Peripheral pulses palpable 2+ bilaterally      LABS:	 	    CBC Full  -  ( 21 Feb 2020 07:15 )  WBC Count : 5.95 K/uL  Hemoglobin : 14.0 g/dL  Hematocrit : 43.6 %  Platelet Count - Automated : 100 K/uL  Mean Cell Volume : 89.5 fl  Mean Cell Hemoglobin : 28.7 pg  Mean Cell Hemoglobin Concentration : 32.1 gm/dL  Auto Neutrophil # : 3.88 K/uL  Auto Lymphocyte # : 1.05 K/uL  Auto Monocyte # : 0.78 K/uL  Auto Eosinophil # : 0.19 K/uL  Auto Basophil # : 0.03 K/uL  Auto Neutrophil % : 65.3 %  Auto Lymphocyte % : 17.6 %  Auto Monocyte % : 13.1 %  Auto Eosinophil % : 3.2 %  Auto Basophil % : 0.5 %    02-21    141  |  108  |  17  ----------------------------<  84  4.1   |  23  |  0.99  02-20    139  |  108  |  21  ----------------------------<  118<H>  3.6   |  22  |  1.02    Ca    8.6      21 Feb 2020 07:11  Ca    8.5      20 Feb 2020 05:38  Phos  3.2     02-21  Phos  3.3     02-20  Mg     1.9     02-21  Mg     2.0     02-20    TPro  5.9<L>  /  Alb  2.8<L>  /  TBili  0.7  /  DBili  x   /  AST  38  /  ALT  43  /  AlkPhos  55  02-21  TPro  5.4<L>  /  Alb  2.8<L>  /  TBili  0.5  /  DBili  x   /  AST  62<H>  /  ALT  45  /  AlkPhos  56  02-20      proBNP: Serum Pro-Brain Natriuretic Peptide: 1278 pg/mL (02-16 @ 21:23)  Serum Pro-Brain Natriuretic Peptide: 1769 pg/mL (02-16 @ 18:09)    TELEMETRY: SR 70-80's bpm; 6 beats NSVT  	      ASSESSMENT/PLAN: 24H hour events: Patient without complaints. Tele without complaints    MEDICATIONS:  aspirin enteric coated 81 milliGRAM(s) Oral daily  clopidogrel Tablet 75 milliGRAM(s) Oral daily  metoprolol tartrate 12.5 milliGRAM(s) Oral every 6 hours  vancomycin  IVPB 1000 milliGRAM(s) IV Intermittent once  acetaminophen   Tablet .. 975 milliGRAM(s) Oral every 6 hours PRN  famotidine    Tablet 20 milliGRAM(s) Oral daily  polyethylene glycol 3350 17 Gram(s) Oral every 24 hours  senna 2 Tablet(s) Oral at bedtime  atorvastatin 80 milliGRAM(s) Oral at bedtime  chlorhexidine 2% Cloths 1 Application(s) Topical <User Schedule>  chlorhexidine 4% Liquid 1 Application(s) Topical <User Schedule>      REVIEW OF SYSTEMS:  See HPI, otherwise ROS negative.    PHYSICAL EXAM:  T(C): 36.8 (02-21-20 @ 04:15), Max: 36.8 (02-20-20 @ 14:00)  HR: 76 (02-21-20 @ 04:15) (75 - 84)  BP: 130/77 (02-21-20 @ 04:15) (102/61 - 131/77)  RR: 18 (02-21-20 @ 04:15) (14 - 21)  SpO2: 96% (02-21-20 @ 04:15) (96% - 98%)  Wt(kg): --  I&O's Summary    20 Feb 2020 07:01  -  21 Feb 2020 07:00  --------------------------------------------------------  IN: 680 mL / OUT: 1550 mL / NET: -870 mL        Appearance: Alert. NAD	  Cardiovascular: +S1S2 RRR no m/g/r  Respiratory: CTA B/L	  Psychiatry: A & O x 3, Mood & affect appropriate  Neurologic: Non-focal  Extremities: No edema BLE  Vascular: Peripheral pulses palpable 2+ bilaterally      LABS:	 	    CBC Full  -  ( 21 Feb 2020 07:15 )  WBC Count : 5.95 K/uL  Hemoglobin : 14.0 g/dL  Hematocrit : 43.6 %  Platelet Count - Automated : 100 K/uL  Mean Cell Volume : 89.5 fl  Mean Cell Hemoglobin : 28.7 pg  Mean Cell Hemoglobin Concentration : 32.1 gm/dL  Auto Neutrophil # : 3.88 K/uL  Auto Lymphocyte # : 1.05 K/uL  Auto Monocyte # : 0.78 K/uL  Auto Eosinophil # : 0.19 K/uL  Auto Basophil # : 0.03 K/uL  Auto Neutrophil % : 65.3 %  Auto Lymphocyte % : 17.6 %  Auto Monocyte % : 13.1 %  Auto Eosinophil % : 3.2 %  Auto Basophil % : 0.5 %    02-21    141  |  108  |  17  ----------------------------<  84  4.1   |  23  |  0.99  02-20    139  |  108  |  21  ----------------------------<  118<H>  3.6   |  22  |  1.02    Ca    8.6      21 Feb 2020 07:11  Ca    8.5      20 Feb 2020 05:38  Phos  3.2     02-21  Phos  3.3     02-20  Mg     1.9     02-21  Mg     2.0     02-20    TPro  5.9<L>  /  Alb  2.8<L>  /  TBili  0.7  /  DBili  x   /  AST  38  /  ALT  43  /  AlkPhos  55  02-21  TPro  5.4<L>  /  Alb  2.8<L>  /  TBili  0.5  /  DBili  x   /  AST  62<H>  /  ALT  45  /  AlkPhos  56  02-20      proBNP: Serum Pro-Brain Natriuretic Peptide: 1278 pg/mL (02-16 @ 21:23)  Serum Pro-Brain Natriuretic Peptide: 1769 pg/mL (02-16 @ 18:09)    TELEMETRY: SR 70-80's bpm; 6 beats NSVT  	      ASSESSMENT/PLAN: 	  65y/o male h/o AWMI s/p LAD PCI in 2013, ICM s/p Medtronic ICD p/w syncope & found to be in hemodynamically unstable VT ~190's bpm (below VT detect rate) s/p external DCCV S/p diagnostic cardiac cath 2/18 mild to mod distal disease s/p VT ablation   1. VT s/p VT ablation 2/19/2020  2. Medtronic ICD  3. CAD s/p prior PCI    --For ICD generator change and repositioning of ICD generator given device is superficial today  --Keep NPO  --Continue to monitor on telemetry    Susana Ngo PA-C  [phone] 24H hour events: Patient without complaints. Tele without complaints    MEDICATIONS:  aspirin enteric coated 81 milliGRAM(s) Oral daily  clopidogrel Tablet 75 milliGRAM(s) Oral daily  metoprolol tartrate 12.5 milliGRAM(s) Oral every 6 hours  vancomycin  IVPB 1000 milliGRAM(s) IV Intermittent once  acetaminophen   Tablet .. 975 milliGRAM(s) Oral every 6 hours PRN  famotidine    Tablet 20 milliGRAM(s) Oral daily  polyethylene glycol 3350 17 Gram(s) Oral every 24 hours  senna 2 Tablet(s) Oral at bedtime  atorvastatin 80 milliGRAM(s) Oral at bedtime  chlorhexidine 2% Cloths 1 Application(s) Topical <User Schedule>  chlorhexidine 4% Liquid 1 Application(s) Topical <User Schedule>      REVIEW OF SYSTEMS:  See HPI, otherwise ROS negative.    PHYSICAL EXAM:  T(C): 36.8 (02-21-20 @ 04:15), Max: 36.8 (02-20-20 @ 14:00)  HR: 76 (02-21-20 @ 04:15) (75 - 84)  BP: 130/77 (02-21-20 @ 04:15) (102/61 - 131/77)  RR: 18 (02-21-20 @ 04:15) (14 - 21)  SpO2: 96% (02-21-20 @ 04:15) (96% - 98%)  Wt(kg): --  I&O's Summary    20 Feb 2020 07:01  -  21 Feb 2020 07:00  --------------------------------------------------------  IN: 680 mL / OUT: 1550 mL / NET: -870 mL        Appearance: Alert. NAD	  Cardiovascular: +S1S2 RRR no m/g/r  Respiratory: CTA B/L	  Psychiatry: A & O x 3, Mood & affect appropriate  Neurologic: Non-focal  Extremities: No edema BLE  Vascular: Peripheral pulses palpable 2+ bilaterally      LABS:	 	    CBC Full  -  ( 21 Feb 2020 07:15 )  WBC Count : 5.95 K/uL  Hemoglobin : 14.0 g/dL  Hematocrit : 43.6 %  Platelet Count - Automated : 100 K/uL  Mean Cell Volume : 89.5 fl  Mean Cell Hemoglobin : 28.7 pg  Mean Cell Hemoglobin Concentration : 32.1 gm/dL  Auto Neutrophil # : 3.88 K/uL  Auto Lymphocyte # : 1.05 K/uL  Auto Monocyte # : 0.78 K/uL  Auto Eosinophil # : 0.19 K/uL  Auto Basophil # : 0.03 K/uL  Auto Neutrophil % : 65.3 %  Auto Lymphocyte % : 17.6 %  Auto Monocyte % : 13.1 %  Auto Eosinophil % : 3.2 %  Auto Basophil % : 0.5 %    02-21    141  |  108  |  17  ----------------------------<  84  4.1   |  23  |  0.99  02-20    139  |  108  |  21  ----------------------------<  118<H>  3.6   |  22  |  1.02    Ca    8.6      21 Feb 2020 07:11  Ca    8.5      20 Feb 2020 05:38  Phos  3.2     02-21  Phos  3.3     02-20  Mg     1.9     02-21  Mg     2.0     02-20    TPro  5.9<L>  /  Alb  2.8<L>  /  TBili  0.7  /  DBili  x   /  AST  38  /  ALT  43  /  AlkPhos  55  02-21  TPro  5.4<L>  /  Alb  2.8<L>  /  TBili  0.5  /  DBili  x   /  AST  62<H>  /  ALT  45  /  AlkPhos  56  02-20      proBNP: Serum Pro-Brain Natriuretic Peptide: 1278 pg/mL (02-16 @ 21:23)  Serum Pro-Brain Natriuretic Peptide: 1769 pg/mL (02-16 @ 18:09)    TELEMETRY: SR 70-80's bpm; 6 beats NSVT  	      ASSESSMENT/PLAN: 	  63y/o male h/o AWMI s/p LAD PCI in 2013, ICM s/p Medtronic ICD p/w syncope & found to be in hemodynamically unstable VT ~190's bpm (below VT detect rate) s/p external DCCV S/p diagnostic cardiac cath 2/18 mild to mod distal disease s/p VT ablation   1. VT s/p VT ablation 2/19/2020  2. Medtronic ICD  3. CAD s/p prior PCI    --For ICD generator change and repositioning of ICD generator given device is superficial today  --Keep NPO  --Continue to monitor on telemetry    Susana Ngo PA-C  [phone]      Addendum:  s/p ICD generator change and pocket revision. Do not restart heparin gtt--just on prophylactically for ~24hrs post VT ablation. Does not need continued AC.    Susana Ngo PA-C

## 2020-02-22 DIAGNOSIS — D69.6 THROMBOCYTOPENIA, UNSPECIFIED: ICD-10-CM

## 2020-02-22 DIAGNOSIS — I47.2 VENTRICULAR TACHYCARDIA: ICD-10-CM

## 2020-02-22 DIAGNOSIS — K21.9 GASTRO-ESOPHAGEAL REFLUX DISEASE WITHOUT ESOPHAGITIS: ICD-10-CM

## 2020-02-22 DIAGNOSIS — Z29.9 ENCOUNTER FOR PROPHYLACTIC MEASURES, UNSPECIFIED: ICD-10-CM

## 2020-02-22 DIAGNOSIS — I21.4 NON-ST ELEVATION (NSTEMI) MYOCARDIAL INFARCTION: ICD-10-CM

## 2020-02-22 LAB
ANION GAP SERPL CALC-SCNC: 8 MMOL/L — SIGNIFICANT CHANGE UP (ref 5–17)
APPEARANCE UR: CLEAR — SIGNIFICANT CHANGE UP
BILIRUB UR-MCNC: NEGATIVE — SIGNIFICANT CHANGE UP
BUN SERPL-MCNC: 21 MG/DL — SIGNIFICANT CHANGE UP (ref 7–23)
CALCIUM SERPL-MCNC: 8.8 MG/DL — SIGNIFICANT CHANGE UP (ref 8.4–10.5)
CHLORIDE SERPL-SCNC: 103 MMOL/L — SIGNIFICANT CHANGE UP (ref 96–108)
CO2 SERPL-SCNC: 22 MMOL/L — SIGNIFICANT CHANGE UP (ref 22–31)
COLOR SPEC: SIGNIFICANT CHANGE UP
CREAT SERPL-MCNC: 0.99 MG/DL — SIGNIFICANT CHANGE UP (ref 0.5–1.3)
DIFF PNL FLD: ABNORMAL
GLUCOSE SERPL-MCNC: 97 MG/DL — SIGNIFICANT CHANGE UP (ref 70–99)
GLUCOSE UR QL: NEGATIVE — SIGNIFICANT CHANGE UP
HCT VFR BLD CALC: 45.1 % — SIGNIFICANT CHANGE UP (ref 39–50)
HGB BLD-MCNC: 14.6 G/DL — SIGNIFICANT CHANGE UP (ref 13–17)
KETONES UR-MCNC: NEGATIVE — SIGNIFICANT CHANGE UP
LEUKOCYTE ESTERASE UR-ACNC: NEGATIVE — SIGNIFICANT CHANGE UP
MCHC RBC-ENTMCNC: 29.4 PG — SIGNIFICANT CHANGE UP (ref 27–34)
MCHC RBC-ENTMCNC: 32.4 GM/DL — SIGNIFICANT CHANGE UP (ref 32–36)
MCV RBC AUTO: 90.9 FL — SIGNIFICANT CHANGE UP (ref 80–100)
NITRITE UR-MCNC: NEGATIVE — SIGNIFICANT CHANGE UP
NRBC # BLD: 0 /100 WBCS — SIGNIFICANT CHANGE UP (ref 0–0)
PH UR: 6 — SIGNIFICANT CHANGE UP (ref 5–8)
PLATELET # BLD AUTO: 102 K/UL — LOW (ref 150–400)
POTASSIUM SERPL-MCNC: 4.1 MMOL/L — SIGNIFICANT CHANGE UP (ref 3.5–5.3)
POTASSIUM SERPL-SCNC: 4.1 MMOL/L — SIGNIFICANT CHANGE UP (ref 3.5–5.3)
PROT UR-MCNC: NEGATIVE — SIGNIFICANT CHANGE UP
RBC # BLD: 4.96 M/UL — SIGNIFICANT CHANGE UP (ref 4.2–5.8)
RBC # FLD: 14.5 % — SIGNIFICANT CHANGE UP (ref 10.3–14.5)
SODIUM SERPL-SCNC: 133 MMOL/L — LOW (ref 135–145)
SP GR SPEC: 1.02 — SIGNIFICANT CHANGE UP (ref 1.01–1.02)
UROBILINOGEN FLD QL: NEGATIVE — SIGNIFICANT CHANGE UP
WBC # BLD: 6.31 K/UL — SIGNIFICANT CHANGE UP (ref 3.8–10.5)
WBC # FLD AUTO: 6.31 K/UL — SIGNIFICANT CHANGE UP (ref 3.8–10.5)

## 2020-02-22 PROCEDURE — 99232 SBSQ HOSP IP/OBS MODERATE 35: CPT

## 2020-02-22 PROCEDURE — 71045 X-RAY EXAM CHEST 1 VIEW: CPT | Mod: 26

## 2020-02-22 PROCEDURE — 93010 ELECTROCARDIOGRAM REPORT: CPT

## 2020-02-22 PROCEDURE — 99233 SBSQ HOSP IP/OBS HIGH 50: CPT | Mod: GC

## 2020-02-22 RX ADMIN — FAMOTIDINE 20 MILLIGRAM(S): 10 INJECTION INTRAVENOUS at 09:48

## 2020-02-22 RX ADMIN — Medication 81 MILLIGRAM(S): at 09:48

## 2020-02-22 RX ADMIN — POLYETHYLENE GLYCOL 3350 17 GRAM(S): 17 POWDER, FOR SOLUTION ORAL at 21:30

## 2020-02-22 RX ADMIN — Medication 12.5 MILLIGRAM(S): at 03:28

## 2020-02-22 RX ADMIN — Medication 12.5 MILLIGRAM(S): at 09:48

## 2020-02-22 RX ADMIN — Medication 975 MILLIGRAM(S): at 03:28

## 2020-02-22 RX ADMIN — Medication 12.5 MILLIGRAM(S): at 17:02

## 2020-02-22 RX ADMIN — Medication 12.5 MILLIGRAM(S): at 23:39

## 2020-02-22 RX ADMIN — Medication 975 MILLIGRAM(S): at 02:45

## 2020-02-22 RX ADMIN — CHLORHEXIDINE GLUCONATE 1 APPLICATION(S): 213 SOLUTION TOPICAL at 05:26

## 2020-02-22 RX ADMIN — ATORVASTATIN CALCIUM 80 MILLIGRAM(S): 80 TABLET, FILM COATED ORAL at 21:30

## 2020-02-22 RX ADMIN — CLOPIDOGREL BISULFATE 75 MILLIGRAM(S): 75 TABLET, FILM COATED ORAL at 09:48

## 2020-02-22 RX ADMIN — SENNA PLUS 2 TABLET(S): 8.6 TABLET ORAL at 21:30

## 2020-02-22 NOTE — PROGRESS NOTE ADULT - ASSESSMENT
64 year old M with hx of AWMI s/p BMS to LAD in 2013, severe ICM s/p ICD (medtronic); transferred from [facility] ED, presented with syncope, found to be hypotensive, and VT, s/p DCCV x 1, NSTEMI, s/p ASA, plavix load, and heparin gtt. Off of pressor support. s/p LHC showing mild-moderate distal disease. s/p VT ablation 2/19.  ICD battery change on 2/21.

## 2020-02-22 NOTE — PROGRESS NOTE ADULT - SUBJECTIVE AND OBJECTIVE BOX
Patient is a 64y old  Male who presents with a chief complaint of chest pain (2020 10:19)        SUBJECTIVE / OVERNIGHT EVENTS: Patient reports slight upper right chest discomfort at ICD site, but otherwise no SOB or other symptoms.       MEDICATIONS  (STANDING):  aspirin enteric coated 81 milliGRAM(s) Oral daily  atorvastatin 80 milliGRAM(s) Oral at bedtime  chlorhexidine 2% Cloths 1 Application(s) Topical <User Schedule>  chlorhexidine 4% Liquid 1 Application(s) Topical <User Schedule>  clopidogrel Tablet 75 milliGRAM(s) Oral daily  famotidine    Tablet 20 milliGRAM(s) Oral daily  metoprolol tartrate 12.5 milliGRAM(s) Oral every 6 hours  polyethylene glycol 3350 17 Gram(s) Oral every 24 hours  senna 2 Tablet(s) Oral at bedtime    MEDICATIONS  (PRN):  acetaminophen   Tablet .. 975 milliGRAM(s) Oral every 6 hours PRN Mild Pain (1 - 3)      Vital Signs Last 24 Hrs  T(C): 36.4 (2020 11:54), Max: 36.9 (2020 18:28)  T(F): 97.5 (2020 11:54), Max: 98.4 (2020 18:28)  HR: 88 (2020 11:54) (78 - 88)  BP: 127/80 (2020 11:54) (107/64 - 136/84)  BP(mean): --  RR: 18 (2020 11:54) (18 - 18)  SpO2: 97% (2020 11:54) (97% - 97%)  CAPILLARY BLOOD GLUCOSE        I&O's Summary    2020 07:  -  2020 07:00  --------------------------------------------------------  IN: 470 mL / OUT: 600 mL / NET: -130 mL    2020 07:01  -  2020 16:50  --------------------------------------------------------  IN: 360 mL / OUT: 800 mL / NET: -440 mL          PHYSICAL EXAM:   GENERAL: NAD, well-developed  HEAD:  Atraumatic, Normocephalic  EYES: Conjunctiva and sclera clear  NECK: Supple  CHEST/LUNG: Clear to auscultation bilaterally; No wheeze; Right upper chest ICD site slightly swollen and tender  HEART: S1S2 normal. Regular rate and rhythm; No murmurs, rubs, or gallops  ABDOMEN: Soft, Nontender, Nondistended; Bowel sounds present  EXTREMITIES: No clubbing, cyanosis, or edema  PSYCH/Neuro: AAOx3. Non-focal.   SKIN: As above.       LABS:                        14.6   6.31  )-----------( 102      ( 2020 07:16 )             45.1     -    133<L>  |  103  |  21  ----------------------------<  97  4.1   |  22  |  0.99    Ca    8.8      2020 07:09  Phos  3.2       Mg     1.9     -    TPro  5.9<L>  /  Alb  2.8<L>  /  TBili  0.7  /  DBili  x   /  AST  38  /  ALT  43  /  AlkPhos  55  02-21    PT/INR - ( 2020 07:15 )   PT: 12.8 sec;   INR: 1.11 ratio         PTT - ( 2020 07:15 )  PTT:27.8 sec      Urinalysis Basic - ( 2020 08:26 )    Color: Light Yellow / Appearance: Clear / S.018 / pH: x  Gluc: x / Ketone: Negative  / Bili: Negative / Urobili: Negative   Blood: x / Protein: Negative / Nitrite: Negative   Leuk Esterase: Negative / RBC: 4 /hpf / WBC 1 /HPF   Sq Epi: x / Non Sq Epi: 0 /hpf / Bacteria: Negative      < from: Xray Chest 1 View AP/PA (20 @ 13:52) >  IMPRESSION:   Clear lungs.     < end of copied text >      Telemetry reviewed: Sinus 80-90's.     RADIOLOGY & ADDITIONAL TESTS:    Imaging Personally Reviewed: CXR report  Consultant(s) Notes Reviewed:  EPS and cardio  Care Discussed with Consultants/Other Providers:

## 2020-02-22 NOTE — PROGRESS NOTE ADULT - ASSESSMENT
63 yo male w h/o ischemic cardiomyopathy s/p ICD, CAD (AWMI s/p BMS to LAD in 2013), transfer to [facility] from outside hospital after syncope and VT, s/p DCCV x 1. s/p LHC showing mild-moderate distal disease. s/p VT ablation 2/19.  ICD battery change on 2/21.       Ventricular Tachycardia   - s/p VT ablation 2/19 and ICD gen change yesterday   - Sinus rhythm on telemetry with no ectopy   - c/w Metoprolol. Would transition to Toprol 50 upon discharge    Ischemic Cardiomyopathy   - Appears euvolemic on exam.    - c/w beta blocker   - Ideally patient should be on ACE/ARB. He has had softer BP during his hospital course. This medication can be added as an outpatient.    CAD   - s/p LHC with non-obstructive CAD   - c/w ASA, Plavix, Lipitor 80    From a general cardiology perspective, there is no further inpatient cardiac workup needed. May be discharged when cleared from EP perspective. He should be seen within 1-2 weeks in our cardiology clinic.    Nabor Shetty MD  Cardiology Fellow  [phone]  All Cardiology service information can be found 24/7 on [email or web address], password: [email or web address]

## 2020-02-22 NOTE — PROGRESS NOTE ADULT - SUBJECTIVE AND OBJECTIVE BOX
Nabor Shetty MD  Cardiology Fellow  [phone]  All Cardiology service information can be found 24/7 on [email or web address], password: [email or web address]    Patient seen and examined at bedside.    Overnight Events: No acute events.    REVIEW OF SYSTEMS:  General: no fatigue/malaise, weight loss/gain.  Skin: no rashes.  Ophthalmologic: no blurred vision, no loss of vision. 	  ENT: no sore throat, rhinorrhea, sinus congestion.  Respiratory: no SOB, cough or wheeze.  CV: No chest pain. No palpitations.   Gastrointestinal:  no N/V/D, no melena/hematemesis/hematochezia.  Genitourinary: no dysuria/hesitancy or hematuria.  Musculoskeletal: no myalgias or arthralgias.  Neurological: no changes in vision or hearing, no lightheadedness/dizziness, no syncope/near syncope	  Psychiatric: no unusual stress/anxiety.   Hematology/Lymphatics: no unusual bleeding, bruising and no lymphadenopathy.  Endocrine: no unusual thirst.        Current Meds:  acetaminophen   Tablet .. 975 milliGRAM(s) Oral every 6 hours PRN  aspirin enteric coated 81 milliGRAM(s) Oral daily  atorvastatin 80 milliGRAM(s) Oral at bedtime  chlorhexidine 2% Cloths 1 Application(s) Topical <User Schedule>  chlorhexidine 4% Liquid 1 Application(s) Topical <User Schedule>  clopidogrel Tablet 75 milliGRAM(s) Oral daily  famotidine    Tablet 20 milliGRAM(s) Oral daily  metoprolol tartrate 12.5 milliGRAM(s) Oral every 6 hours  polyethylene glycol 3350 17 Gram(s) Oral every 24 hours  senna 2 Tablet(s) Oral at bedtime      PAST MEDICAL & SURGICAL HISTORY:  Hyperlipidemia  HTN (hypertension)  Stented coronary artery  MI (myocardial infarction)  History of tonsillectomy      Vitals:  T(F): 98 (02-22), Max: 98.4 (02-21)  HR: 78 (02-22) (75 - 82)  BP: 136/84 (02-22) (107/64 - 136/84)  RR: 18 (02-22)  SpO2: 97% (02-22)  I&O's Summary    21 Feb 2020 07:01  -  22 Feb 2020 07:00  --------------------------------------------------------  IN: 470 mL / OUT: 600 mL / NET: -130 mL        Physical Exam:  Appearance: No acute distress  Eyes: PERRL, EOMI, pink conjunctiva  HENT: Normal oral mucosa  Cardiovascular: RRR, S1, S2, no murmurs, rubs, or gallops; no edema; no JVD  Respiratory: Diminished R base, likely atelectasis  Gastrointestinal: soft, non-tender, non-distended with normal bowel sounds  Musculoskeletal: No clubbing; no joint deformity   Neurologic: Non-focal  Lymphatic: No lymphadenopathy  Psychiatry: AAOx3, mood & affect appropriate  Skin: No rashes, ecchymoses, or cyanosis. ICD site with dressing.                          14.6   6.31  )-----------( 102      ( 22 Feb 2020 07:16 )             45.1     02-21    141  |  108  |  17  ----------------------------<  84  4.1   |  23  |  0.99    Ca    8.6      21 Feb 2020 07:11  Phos  3.2     02-21  Mg     1.9     02-21    TPro  5.9<L>  /  Alb  2.8<L>  /  TBili  0.7  /  DBili  x   /  AST  38  /  ALT  43  /  AlkPhos  55  02-21    PT/INR - ( 21 Feb 2020 07:15 )   PT: 12.8 sec;   INR: 1.11 ratio         PTT - ( 21 Feb 2020 07:15 )  PTT:27.8 sec      Serum Pro-Brain Natriuretic Peptide: 1278 pg/mL (02-16 @ 21:23)  Serum Pro-Brain Natriuretic Peptide: 1769 pg/mL (02-16 @ 18:09)          New ECG(s): Personally reviewed    Echo: < from: TTE with Doppler (w/Cont) (02.17.20 @ 10:25) >  Dimensions:    Normal Values:  LA:     3.8    2.0 - 4.0 cm  Ao:     3.2    2.0 - 3.8 cm  SEPTUM: 0.8    0.6 - 1.2 cm  PWT:    0.9    0.6 - 1.1 cm  LVIDd:  5.8    3.0 - 5.6 cm  LVIDs:  4.8    1.8 - 4.0 cm  Derived variables:  LVMI: 106 g/m2  RWT: 0.31  Fractional short: 17 %  EF (Celeste Rule): 28 %  ------------------------------------------------------------------------  Observations:  Mitral Valve: Mitral annular calcification, otherwise  normal mitral valve. Mild-moderate mitral regurgitation.  Aortic Valve/Aorta: Normal trileaflet aortic valve.  Aortic Root: 3.2 cm.  Left Atrium: Mildly dilated left atrium.  LA volume index =  37 cc/m2.  Left Ventricle: Severe segmental left ventricular systolic  dysfunction. The septum, apex, mid to distal lateral, mid  to distal anterior, distal inferior walls are hypokinetic.  Endocardial visualization enhanced with intravenous  injection of Ultrasonic Enhancing Agent (Definity). No left  ventricular thrombus. Normal left ventricular internal  dimensions and wall thickness. Mild diastolic dysfunction  (Stage I).  Right Heart: Normal right atrium. Normal right ventricular  size and function.  A device wire is noted in the right  heart. Normal tricuspid valve. Minimal tricuspid  regurgitation. Normal pulmonic valve.  Pericardium/Pleura: Normal pericardium with trace  pericardial effusion.  Hemodynamic: Estimated right atrial pressure is 8 mm Hg.  ------------------------------------------------------------------------  Conclusions:  1. Mitral annular calcification, otherwise normal mitral  valve. Mild-moderate mitral regurgitation.  2. Normal trileaflet aortic valve.  3. Normal left ventricular internal dimensions andwall  thickness.  4. Severe segmental left ventricular systolic dysfunction.  The septum, apex, mid to distal lateral, mid to distal  anterior, distal inferior walls are hypokinetic.  Endocardial visualization enhanced with intravenous  injection of Ultrasonic Enhancing Agent (Definity). No left  ventricular thrombus.  5. Normal right ventricular size and function.  A device  wire is noted in the right heart.    < end of copied text >      Stress Testing:     Cath:    Imaging:    Interpretation of Telemetry: Sinus 80-90

## 2020-02-22 NOTE — PROGRESS NOTE ADULT - SUBJECTIVE AND OBJECTIVE BOX
24H hour events: Patient without complaints. Tele unremarkable    MEDICATIONS:  aspirin enteric coated 81 milliGRAM(s) Oral daily  clopidogrel Tablet 75 milliGRAM(s) Oral daily  metoprolol tartrate 12.5 milliGRAM(s) Oral every 6 hours  acetaminophen   Tablet .. 975 milliGRAM(s) Oral every 6 hours PRN  famotidine    Tablet 20 milliGRAM(s) Oral daily  polyethylene glycol 3350 17 Gram(s) Oral every 24 hours  senna 2 Tablet(s) Oral at bedtime  atorvastatin 80 milliGRAM(s) Oral at bedtime  chlorhexidine 2% Cloths 1 Application(s) Topical <User Schedule>  chlorhexidine 4% Liquid 1 Application(s) Topical <User Schedule>      REVIEW OF SYSTEMS:  See HPI, otherwise ROS negative.    PHYSICAL EXAM:  T(C): 36.4 (02-22-20 @ 11:54), Max: 36.9 (02-21-20 @ 18:28)  HR: 88 (02-22-20 @ 11:54) (75 - 88)  BP: 127/80 (02-22-20 @ 11:54) (107/64 - 136/84)  RR: 18 (02-22-20 @ 11:54) (18 - 18)  SpO2: 97% (02-22-20 @ 11:54) (97% - 100%)  Wt(kg): --  I&O's Summary    21 Feb 2020 07:01  -  22 Feb 2020 07:00  --------------------------------------------------------  IN: 470 mL / OUT: 600 mL / NET: -130 mL    22 Feb 2020 07:01  -  22 Feb 2020 13:16  --------------------------------------------------------  IN: 180 mL / OUT: 350 mL / NET: -170 mL        Appearance: Alert. NAD	  Cardiovascular: +S1S2 RRR no m/g/r  ICD site (left pectoral): No hematoma, active bleeding or wound compromise;   Respiratory: CTA B/L	  Psychiatry: A & O x 3, Mood & affect appropriate  Neurologic: Non-focal  Extremities: No edema BLE  Vascular: Peripheral pulses palpable 2+ bilaterally      LABS:	 	    CBC Full  -  ( 22 Feb 2020 07:16 )  WBC Count : 6.31 K/uL  Hemoglobin : 14.6 g/dL  Hematocrit : 45.1 %  Platelet Count - Automated : 102 K/uL  Mean Cell Volume : 90.9 fl  Mean Cell Hemoglobin : 29.4 pg  Mean Cell Hemoglobin Concentration : 32.4 gm/dL    02-22    133<L>  |  103  |  21  ----------------------------<  97  4.1   |  22  |  0.99  02-21    141  |  108  |  17  ----------------------------<  84  4.1   |  23  |  0.99    Ca    8.8      22 Feb 2020 07:09  Ca    8.6      21 Feb 2020 07:11  Phos  3.2     02-21  Mg     1.9     02-21    TPro  5.9<L>  /  Alb  2.8<L>  /  TBili  0.7  /  DBili  x   /  AST  38  /  ALT  43  /  AlkPhos  55  02-21    TELEMETRY: SR 60-70's bpm  	    	  ASSESSMENT/PLAN: 	  63y/o male h/o AWMI s/p LAD PCI in 2013, ICM s/p Medtronic ICD p/w syncope & found to be in hemodynamically unstable VT ~190's bpm (below VT detect rate) s/p external DCCV S/p diagnostic cardiac cath 2/18 mild to mod distal disease s/p VT ablation   1. VT s/p VT ablation 2/19/2020  2. Medtronic ICD  3. CAD s/p prior PCI  4. s/p ICD pulse generator change with pocket revision 2/21/2020    --Reviewed post procedure instructions with patient, ID booklet given & remote teaching done  --Instructed patient to remove dressing from site tomorrow  --F/U in EP clinic on 2/28 @ 8:05am & F/U with Dr. Guilherme Barton on 3/27 @ 10:40am  --EP will sign off. Reconsult prn    Susana Ngo PA-C

## 2020-02-23 DIAGNOSIS — R07.89 OTHER CHEST PAIN: ICD-10-CM

## 2020-02-23 LAB
CK MB BLD-MCNC: 2.2 % — SIGNIFICANT CHANGE UP (ref 0–3.5)
CK MB CFR SERPL CALC: 2.6 NG/ML — SIGNIFICANT CHANGE UP (ref 0–6.7)
CK SERPL-CCNC: 116 U/L — SIGNIFICANT CHANGE UP (ref 30–200)
TROPONIN T, HIGH SENSITIVITY RESULT: 366 NG/L — HIGH (ref 0–51)

## 2020-02-23 PROCEDURE — 99233 SBSQ HOSP IP/OBS HIGH 50: CPT

## 2020-02-23 PROCEDURE — 93010 ELECTROCARDIOGRAM REPORT: CPT | Mod: 77

## 2020-02-23 PROCEDURE — 93010 ELECTROCARDIOGRAM REPORT: CPT

## 2020-02-23 RX ADMIN — Medication 12.5 MILLIGRAM(S): at 21:42

## 2020-02-23 RX ADMIN — CLOPIDOGREL BISULFATE 75 MILLIGRAM(S): 75 TABLET, FILM COATED ORAL at 10:04

## 2020-02-23 RX ADMIN — Medication 12.5 MILLIGRAM(S): at 10:04

## 2020-02-23 RX ADMIN — Medication 975 MILLIGRAM(S): at 10:29

## 2020-02-23 RX ADMIN — POLYETHYLENE GLYCOL 3350 17 GRAM(S): 17 POWDER, FOR SOLUTION ORAL at 21:42

## 2020-02-23 RX ADMIN — SENNA PLUS 2 TABLET(S): 8.6 TABLET ORAL at 21:42

## 2020-02-23 RX ADMIN — Medication 81 MILLIGRAM(S): at 10:04

## 2020-02-23 RX ADMIN — Medication 12.5 MILLIGRAM(S): at 16:45

## 2020-02-23 RX ADMIN — ATORVASTATIN CALCIUM 80 MILLIGRAM(S): 80 TABLET, FILM COATED ORAL at 21:42

## 2020-02-23 RX ADMIN — Medication 12.5 MILLIGRAM(S): at 05:54

## 2020-02-23 RX ADMIN — Medication 975 MILLIGRAM(S): at 10:59

## 2020-02-23 RX ADMIN — CHLORHEXIDINE GLUCONATE 1 APPLICATION(S): 213 SOLUTION TOPICAL at 05:54

## 2020-02-23 RX ADMIN — FAMOTIDINE 20 MILLIGRAM(S): 10 INJECTION INTRAVENOUS at 10:04

## 2020-02-23 NOTE — PROGRESS NOTE ADULT - PROBLEM SELECTOR PLAN 6
-SCD's given recent procedure.    6. Dispo: -Tentative DCP within next 1-2 days if remains stable/improved. Monitor ICD site.

## 2020-02-23 NOTE — PROVIDER CONTACT NOTE (OTHER) - BACKGROUND
Patient admitted for ST Elevation Myocardial Infarction, Thrombocytopenia, Non-ST Elevation Myocardial Infarction, Gastroesophageal Reflux Disease, Ventricular Tachycardia.

## 2020-02-23 NOTE — CHART NOTE - NSCHARTNOTEFT_GEN_A_CORE
Called from the medicine service regarding EKG changes showing ST elevation in V1-2. Pt resting comfortably s/p recent LHC, VT ablation, and generator change, which he tolerated well. Pt denied any chest discomfort but noted being nervous about having a complication. Vital signs are stable. Telemetry reviewed, which showed sinus rhythm with infrequent PVC's in the past 24 hours. Discussed with the medicine service NP. Continue to monitor telemetry. Repeat EKG with proper V1-2 lead placements.     Cardiology service will continue to follow. Please call with further questions.    Mikhail Holloway, #[phone]  Cardiology Fellow

## 2020-02-23 NOTE — PROVIDER CONTACT NOTE (CRITICAL VALUE NOTIFICATION) - ASSESSMENT
Patient's Lab Troponin T, High Sensitivity Result is 366. Patient is asymptomatic. Patient is Alert and Oriented times Four. Patient denies chest pain, discomfort, palpitations, shortness of breath, dizziness and lightheadedness. No respiratory distress noted. Patient's Heart Rhythm is Normal Sinus Rhythm on the cardiac monitor. Patient's Vital Signs are stable.

## 2020-02-23 NOTE — PROGRESS NOTE ADULT - SUBJECTIVE AND OBJECTIVE BOX
Kristofer Epstein M.D. Pager Number [phone]    Patient is a 64y old  Male who presents with a chief complaint of chest pain (2020 10:19)      SUBJECTIVE / OVERNIGHT EVENTS:  Pt seen and examined at bedside. No acute events overnight. Pt with complaints of pleuritic chest pain this AM. Denies similar pain from before. Denies associated symptoms of sob, palpitations or radiation. Pt denies pain around AICD site.   Pt denies palpitations, sob, abd pain, N/V, fever, chills.    ROS:  All other review of systems negative    Allergies    No Known Allergies    Intolerances        MEDICATIONS  (STANDING):  aspirin enteric coated 81 milliGRAM(s) Oral daily  atorvastatin 80 milliGRAM(s) Oral at bedtime  chlorhexidine 2% Cloths 1 Application(s) Topical <User Schedule>  chlorhexidine 4% Liquid 1 Application(s) Topical <User Schedule>  clopidogrel Tablet 75 milliGRAM(s) Oral daily  famotidine    Tablet 20 milliGRAM(s) Oral daily  metoprolol tartrate 12.5 milliGRAM(s) Oral every 6 hours  polyethylene glycol 3350 17 Gram(s) Oral every 24 hours  senna 2 Tablet(s) Oral at bedtime    MEDICATIONS  (PRN):  acetaminophen   Tablet .. 975 milliGRAM(s) Oral every 6 hours PRN Mild Pain (1 - 3)      Vital Signs Last 24 Hrs  T(C): 36.5 (2020 11:53), Max: 36.7 (2020 20:43)  T(F): 97.7 (2020 11:53), Max: 98 (2020 20:43)  HR: 84 (2020 11:53) (81 - 97)  BP: 121/78 (2020 11:53) (121/78 - 145/85)  BP(mean): --  RR: 18 (2020 11:53) (18 - 18)  SpO2: 97% (2020 11:53) (97% - 97%)  CAPILLARY BLOOD GLUCOSE        I&O's Summary    2020 07:01  -  2020 07:00  --------------------------------------------------------  IN: 940 mL / OUT: 1950 mL / NET: -1010 mL    2020 07:01  -  2020 12:09  --------------------------------------------------------  IN: 300 mL / OUT: 500 mL / NET: -200 mL        PHYSICAL EXAM:  GENERAL: NAD, well-developed  HEAD:  Atraumatic, Normocephalic  EYES: EOMI, PERRLA, conjunctiva and sclera clear  NECK: Supple, No JVD  CHEST/LUNG: Clear to auscultation bilaterally; No wheeze  HEART: Regular rate and rhythm; No murmurs, rubs, or gallops  ABDOMEN: Soft, Nontender, Nondistended; Bowel sounds present  EXTREMITIES:  2+ Peripheral Pulses, No clubbing, cyanosis, or edema  NEUROLOGY: AAOx3, non-focal  PSYCH: calm  SKIN: Right upper chest ICD site slightly swollen with a few blisters noted.     LABS:                        14.6   6.31  )-----------( 102      ( 2020 07:16 )             45.1     02-22    133<L>  |  103  |  21  ----------------------------<  97  4.1   |  22  |  0.99    Ca    8.8      2020 07:09        CARDIAC MARKERS ( 2020 10:50 )  x     / x     / 116 U/L / x     / 2.6 ng/mL      Urinalysis Basic - ( 2020 08:26 )    Color: Light Yellow / Appearance: Clear / S.018 / pH: x  Gluc: x / Ketone: Negative  / Bili: Negative / Urobili: Negative   Blood: x / Protein: Negative / Nitrite: Negative   Leuk Esterase: Negative / RBC: 4 /hpf / WBC 1 /HPF   Sq Epi: x / Non Sq Epi: 0 /hpf / Bacteria: Negative        RADIOLOGY & ADDITIONAL TESTS:  Results Reviewed:   Imaging Personally Reviewed:  Electrocardiogram Personally Reviewed:    COORDINATION OF CARE:  Care Discussed with Consultants/Other Providers [Y/N]:  Prior or Outpatient Records Reviewed [Y/N]:

## 2020-02-23 NOTE — PROVIDER CONTACT NOTE (OTHER) - RECOMMENDATIONS
Assess patient. Review patient's orders, labs, history & plan. Address patient's Chest Pain. Order labs and 12-Lead ECG.

## 2020-02-23 NOTE — PROVIDER CONTACT NOTE (OTHER) - ACTION/TREATMENT ORDERED:
NP aware & assessing patient & reviewed orders, labs & history. NP ordered 12-Lead ECG, Labs Cardiac Enzymes & administer PRN Tylenol. NP reviewed 12-Lead ECGs. Patient states his Chest Pain resolved.

## 2020-02-23 NOTE — PROGRESS NOTE ADULT - ASSESSMENT
64 year old M with hx of AWMI s/p BMS to LAD in 2013, severe ICM s/p ICD (medtronic); transferred from [place] ED, presented with syncope, found to be hypotensive, and VT, s/p DCCV x 1, NSTEMI, s/p ASA, plavix load, and heparin gtt. Off of pressor support. s/p LHC showing mild-moderate distal disease. s/p VT ablation 2/19.  ICD battery change on 2/21.

## 2020-02-23 NOTE — CHART NOTE - NSCHARTNOTEFT_GEN_A_CORE
Repeat EKG similar to previous EKG on AM with st elevations in V1V2, Spoke to cardiology fellow; since no further CP and down trending Trop advised to continue to monitor. No further intervention required./

## 2020-02-23 NOTE — PROVIDER CONTACT NOTE (OTHER) - ASSESSMENT
Patient complaining of intermittent non-radiating Left Chest Pain rating the pain a 3/10 and describing the pain as burning. Patient adds that the precipitating factors are unknown. Patient states that he had this Left Chest Pain during Night Shift and then his pain was a 4/10. Patient is Alert and Oriented times Four. Patient denies palpitations, shortness of breath, dizziness and lightheadedness. No respiratory distress noted. Patient's Heart Rhythm is Normal Sinus Rhythm on the cardiac monitor. Patient's Vital Signs: Temperature 97.8 F Oral, Heart Rate 94, Blood Pressure 125/77, Respiratory Rate 18 and O2 Saturation 97% Room Air.

## 2020-02-23 NOTE — PROVIDER CONTACT NOTE (CRITICAL VALUE NOTIFICATION) - ACTION/TREATMENT ORDERED:
NP aware & assessed patient & reviewed all orders, lab results, test results, history & plan. NP consulted with Cardiology Team.

## 2020-02-23 NOTE — CHART NOTE - NSCHARTNOTEFT_GEN_A_CORE
Asked to evaluate for c/o chest pain; pt stating has been having intermittent L chest pain/discomfort since last night;  slight increase in pain on deep inspiration. Denies SOB/palpitation/radiation of pain      Vital Signs Last 24 Hrs  T(C): 36.5 (23 Feb 2020 11:53), Max: 36.7 (22 Feb 2020 20:43)  T(F): 97.7 (23 Feb 2020 11:53), Max: 98 (22 Feb 2020 20:43)  HR: 84 (23 Feb 2020 11:53) (81 - 97)  BP: 121/78 (23 Feb 2020 11:53) (121/78 - 145/85)  RR: 18 (23 Feb 2020 11:53) (18 - 18)  SpO2: 97% (23 Feb 2020 11:53) (97% - 97%)    Physical Exam:  General: WN/WD NAD  Neurology: A&Ox3, nonfocal, MARTIN x 4  Head:  Normocephalic, atraumatic  Respiratory: CTA B/L  CV: RRR, S1S2, no murmur.  R ACW PPM site with mild swelling, no erythema, dressing d/i  Abdominal: Soft, NT, ND no palpable mass  MSK: No edema, + peripheral pulses, FROM all 4 extremity  Labs:                        14.6   6.31  )-----------( 102      ( 22 Feb 2020 07:16 )             45.1     02-22    133<L>  |  103  |  21  ----------------------------<  97  4.1   |  22  |  0.99    Ca    8.8      22 Feb 2020 07:09      CARDIAC MARKERS ( 23 Feb 2020 10:50 )  x     / x     / 116 U/L / x     / 2.6 ng/mL          Assessment    64 male with PMHx of MI (2013) w/ DESx1, AICD, HTN, HLD, presented to [place] ED, with c/o dizziness, chest pain complicated with episode of  VT requiring external defib . EKG post DCCV showed SR with ST elevation in leads V1-V4, he was loaded with  mg, plavix 300 mg, heparin gtt and amiodarone gtt was started. He was transferred to [address] for further management. In CCU: he was continued on levophed gtt for pressor support, SBP: 100's MAP: 60's, amiodarone gtt 1 mg /min for VT, and hep gtt. s/p cardiac cath showing non obstructive disease;  s/p VT ablation 2/19/2020 and  s/p ICD pulse generator change with pocket revision 2/21/2020; Now with left sided intermittent chest pain     Plan:  >12 lead EKG ( st elevation V1 V2 wth q waves )  >CE stat ( Trop 366; down trending )  >Tylenol for ? inflammatory pain  > Discussed with cardiology Fellow    >Discussed with attending  Albina Hernandez ANP-BC  #[id number] Events from 10:30 this AM  Asked to evaluate for c/o chest pain; pt stating has been having intermittent L chest pain/discomfort since last night;  slight increase in pain on deep inspiration. Denies SOB/palpitation/radiation of pain      Vital Signs Last 24 Hrs  T(C): 36.5 (23 Feb 2020 11:53), Max: 36.7 (22 Feb 2020 20:43)  T(F): 97.7 (23 Feb 2020 11:53), Max: 98 (22 Feb 2020 20:43)  HR: 84 (23 Feb 2020 11:53) (81 - 97)  BP: 121/78 (23 Feb 2020 11:53) (121/78 - 145/85)  RR: 18 (23 Feb 2020 11:53) (18 - 18)  SpO2: 97% (23 Feb 2020 11:53) (97% - 97%)    Physical Exam:  General: WN/WD NAD  Neurology: A&Ox3, nonfocal, MARTIN x 4  Head:  Normocephalic, atraumatic  Respiratory: CTA B/L  CV: RRR, S1S2, no murmur.  R ACW PPM site with mild swelling, no erythema, dressing d/i  Abdominal: Soft, NT, ND no palpable mass  MSK: No edema, + peripheral pulses, FROM all 4 extremity  Labs:                        14.6   6.31  )-----------( 102      ( 22 Feb 2020 07:16 )             45.1     02-22    133<L>  |  103  |  21  ----------------------------<  97  4.1   |  22  |  0.99    Ca    8.8      22 Feb 2020 07:09      CARDIAC MARKERS ( 23 Feb 2020 10:50 )  x     / x     / 116 U/L / x     / 2.6 ng/mL          Assessment    64 male with PMHx of MI (2013) w/ DESx1, AICD, HTN, HLD, presented to [place] ED, with c/o dizziness, chest pain complicated with episode of  VT requiring external defib . EKG post DCCV showed SR with ST elevation in leads V1-V4, he was loaded with  mg, plavix 300 mg, heparin gtt and amiodarone gtt was started. He was transferred to [address] for further management. In CCU: he was continued on levophed gtt for pressor support, SBP: 100's MAP: 60's, amiodarone gtt 1 mg /min for VT, and hep gtt. s/p cardiac cath showing non obstructive disease;  s/p VT ablation 2/19/2020 and  s/p ICD pulse generator change with pocket revision 2/21/2020; Now with left sided intermittent chest pain     Plan:  >12 lead EKG ( st elevation V1 V2 wth q waves )  >CE stat ( Trop 366; down trending )  >Tylenol for ? inflammatory pain  > Discussed with cardiology Fellow    >Discussed with attending  Albina Hernandez ANP-BC  #[id number]

## 2020-02-24 ENCOUNTER — TRANSCRIPTION ENCOUNTER (OUTPATIENT)
Age: 65
End: 2020-02-24

## 2020-02-24 VITALS
OXYGEN SATURATION: 97 % | DIASTOLIC BLOOD PRESSURE: 68 MMHG | HEART RATE: 85 BPM | SYSTOLIC BLOOD PRESSURE: 115 MMHG | TEMPERATURE: 98 F | RESPIRATION RATE: 18 BRPM

## 2020-02-24 PROCEDURE — 83036 HEMOGLOBIN GLYCOSYLATED A1C: CPT

## 2020-02-24 PROCEDURE — 82553 CREATINE MB FRACTION: CPT

## 2020-02-24 PROCEDURE — 84295 ASSAY OF SERUM SODIUM: CPT

## 2020-02-24 PROCEDURE — 93926 LOWER EXTREMITY STUDY: CPT | Mod: 26,RT

## 2020-02-24 PROCEDURE — 85027 COMPLETE CBC AUTOMATED: CPT

## 2020-02-24 PROCEDURE — 82435 ASSAY OF BLOOD CHLORIDE: CPT

## 2020-02-24 PROCEDURE — C8929: CPT

## 2020-02-24 PROCEDURE — 93654 COMPRE EP EVAL TX VT: CPT

## 2020-02-24 PROCEDURE — C1887: CPT

## 2020-02-24 PROCEDURE — 84132 ASSAY OF SERUM POTASSIUM: CPT

## 2020-02-24 PROCEDURE — 83605 ASSAY OF LACTIC ACID: CPT

## 2020-02-24 PROCEDURE — 93662 INTRACARDIAC ECG (ICE): CPT

## 2020-02-24 PROCEDURE — 84100 ASSAY OF PHOSPHORUS: CPT

## 2020-02-24 PROCEDURE — 83880 ASSAY OF NATRIURETIC PEPTIDE: CPT

## 2020-02-24 PROCEDURE — 81001 URINALYSIS AUTO W/SCOPE: CPT

## 2020-02-24 PROCEDURE — 82330 ASSAY OF CALCIUM: CPT

## 2020-02-24 PROCEDURE — C1769: CPT

## 2020-02-24 PROCEDURE — 76882 US LMTD JT/FCL EVL NVASC XTR: CPT

## 2020-02-24 PROCEDURE — 82947 ASSAY GLUCOSE BLOOD QUANT: CPT

## 2020-02-24 PROCEDURE — 93926 LOWER EXTREMITY STUDY: CPT

## 2020-02-24 PROCEDURE — C1894: CPT

## 2020-02-24 PROCEDURE — 85730 THROMBOPLASTIN TIME PARTIAL: CPT

## 2020-02-24 PROCEDURE — 82550 ASSAY OF CK (CPK): CPT

## 2020-02-24 PROCEDURE — 71045 X-RAY EXAM CHEST 1 VIEW: CPT

## 2020-02-24 PROCEDURE — 33262 RMVL& REPLC PULSE GEN 1 LEAD: CPT

## 2020-02-24 PROCEDURE — 99239 HOSP IP/OBS DSCHRG MGMT >30: CPT | Mod: GC

## 2020-02-24 PROCEDURE — 80053 COMPREHEN METABOLIC PANEL: CPT

## 2020-02-24 PROCEDURE — 85610 PROTHROMBIN TIME: CPT

## 2020-02-24 PROCEDURE — 85014 HEMATOCRIT: CPT

## 2020-02-24 PROCEDURE — 93462 L HRT CATH TRNSPTL PUNCTURE: CPT

## 2020-02-24 PROCEDURE — 99232 SBSQ HOSP IP/OBS MODERATE 35: CPT

## 2020-02-24 PROCEDURE — 84443 ASSAY THYROID STIM HORMONE: CPT

## 2020-02-24 PROCEDURE — 93005 ELECTROCARDIOGRAM TRACING: CPT

## 2020-02-24 PROCEDURE — 82803 BLOOD GASES ANY COMBINATION: CPT

## 2020-02-24 PROCEDURE — 86850 RBC ANTIBODY SCREEN: CPT

## 2020-02-24 PROCEDURE — 99152 MOD SED SAME PHYS/QHP 5/>YRS: CPT

## 2020-02-24 PROCEDURE — 83735 ASSAY OF MAGNESIUM: CPT

## 2020-02-24 PROCEDURE — C1722: CPT

## 2020-02-24 PROCEDURE — 93454 CORONARY ARTERY ANGIO S&I: CPT

## 2020-02-24 PROCEDURE — 80048 BASIC METABOLIC PNL TOTAL CA: CPT

## 2020-02-24 PROCEDURE — 86901 BLOOD TYPING SEROLOGIC RH(D): CPT

## 2020-02-24 PROCEDURE — 86803 HEPATITIS C AB TEST: CPT

## 2020-02-24 PROCEDURE — 84484 ASSAY OF TROPONIN QUANT: CPT

## 2020-02-24 PROCEDURE — 86900 BLOOD TYPING SEROLOGIC ABO: CPT

## 2020-02-24 PROCEDURE — 80061 LIPID PANEL: CPT

## 2020-02-24 RX ORDER — METOPROLOL TARTRATE 50 MG
1 TABLET ORAL
Qty: 0 | Refills: 0 | DISCHARGE

## 2020-02-24 RX ORDER — METOPROLOL TARTRATE 50 MG
1 TABLET ORAL
Qty: 30 | Refills: 0
Start: 2020-02-24

## 2020-02-24 RX ADMIN — Medication 81 MILLIGRAM(S): at 12:36

## 2020-02-24 RX ADMIN — Medication 12.5 MILLIGRAM(S): at 05:51

## 2020-02-24 RX ADMIN — CHLORHEXIDINE GLUCONATE 1 APPLICATION(S): 213 SOLUTION TOPICAL at 05:51

## 2020-02-24 RX ADMIN — Medication 12.5 MILLIGRAM(S): at 12:36

## 2020-02-24 RX ADMIN — CLOPIDOGREL BISULFATE 75 MILLIGRAM(S): 75 TABLET, FILM COATED ORAL at 12:36

## 2020-02-24 RX ADMIN — FAMOTIDINE 20 MILLIGRAM(S): 10 INJECTION INTRAVENOUS at 12:36

## 2020-02-24 NOTE — DISCHARGE NOTE PROVIDER - CARE PROVIDER_API CALL
[facility],   [address] [place] [address]  Phone: [phone]  Fax: (   )    -  Scheduled Appointment: 02/28/2020 08:00 AM    Guilherme Barton)  Cardiac Electrophysiology; Cardiovascular Disease; Internal Medicine  [address] [place] [address]  Phone: [phone]  Fax: [phone]  Scheduled Appointment: 03/27/2020 10:40 AM Guilherme Barton)  Cardiac Electrophysiology; Cardiovascular Disease; Internal Medicine  [address] [place] [address]  Phone: [phone]  Fax: [phone]  Scheduled Appointment: 03/27/2020 10:40 AM    [facility],   [address] [place] [address]  Phone: [phone]  Fax: (   )    -  Scheduled Appointment: 02/28/2020 08:00 AM    Cardiology Clinic, Dr. Mikhail Aparicio  [address] [place] [address]  Phone: [phone]  Fax: (   )    -  Established Patient  Scheduled Appointment: 03/02/2020 02:30 PM

## 2020-02-24 NOTE — PROGRESS NOTE ADULT - PROBLEM SELECTOR PROBLEM 4
Gastroesophageal reflux disease without esophagitis
Gastroesophageal reflux disease without esophagitis
Thrombocytopenia
Thrombocytopenia

## 2020-02-24 NOTE — PROGRESS NOTE ADULT - PROBLEM SELECTOR PROBLEM 3
NSTEMI (non-ST elevated myocardial infarction)
NSTEMI (non-ST elevated myocardial infarction)
Gastroesophageal reflux disease without esophagitis
Gastroesophageal reflux disease without esophagitis

## 2020-02-24 NOTE — DISCHARGE NOTE PROVIDER - CARE PROVIDERS DIRECT ADDRESSES
,DirectAddress_Unknown,[email or web address],DirectAddress_Unknown,DirectAddress_Unknown

## 2020-02-24 NOTE — PROGRESS NOTE ADULT - ASSESSMENT
64 year old M with hx of AWMI s/p BMS to LAD in 2013, severe ICM s/p ICD (medtronic); transferred from [place] ED, presented with syncope, found to be hypotensive, and VT, s/p DCCV x 1, NSTEMI, s/p ASA, plavix load, and heparin gtt. Off of pressor support. s/p LHC showing mild-moderate distal disease. s/p VT ablation 2/19.  ICD battery change on 2/21.

## 2020-02-24 NOTE — PROGRESS NOTE ADULT - PROBLEM SELECTOR PLAN 5
-Asymptomatic. Improving  -Low platelets and some low albumin noted. ?underlying liver disease.   -UA without any proteinuria.   -Monitor CBC and for any bleeding for now.   -Consider further work up outpatient. Monitor for now.
-Asymptomatic. Improving  -Low platelets and some low albumin noted. ?underlying liver disease.   -UA without any proteinuria.   -Monitor CBC and for any bleeding for now.   -Consider further work up outpatient. Monitor for now.
-SCD's given recent procedure.
-SCD's given recent procedure.    6. Dispo: -Tentative DCP within next 1-2 days if remains stable/improved. Monitor ICD site.

## 2020-02-24 NOTE — PROGRESS NOTE ADULT - PROBLEM SELECTOR PLAN 2
-S/p VT ablation on 2/19  -ICD battery change on 2/21  -C/w metoprolol; cardio recs appreciated will convert to Toprol 50mg XL daily on DC.  -Monitor and replete lytes PRN.   -Tylenol PRN for any chest wall pain in ICD site area. -Can use Ice packs for site swelling as needed.
-S/p VT ablation on 2/19  -ICD battery change on 2/21  -C/w metoprolol; cardio recs appreciated will convert to Toprol 50mg XL daily on DC.  -Monitor and replete lytes PRN.   -Tylenol PRN for any chest wall pain in ICD site area. -Can use Ice packs for site swelling as needed.
s/p [facility] 2/19 showing mild-moderate disease in distal vessels with no interventions  - s/p ASA, Plavix load  - c/w asa 81 mg daily, plavix 75 mg daily, and atorvastatin 80mg QHS.   - C/w Metoprolol 12.5mg Q6H for now, will convert to Toprol XL 50mg daily on DC.   - TTE shows EF of 30-35% with segmental abnormalities   -C/w telemetry.  -DASH diet.
s/p [facility] 2/19 showing mild-moderate disease in distal vessels with no interventions  - s/p ASA, Plavix load  - c/w asa 81 mg daily, plavix 75 mg daily, and atorvastatin 80mg QHS.   - C/w Metoprolol 12.5mg Q6H for now, pending further EPS recs.   - TTE shows EF of 30-35% with segmental abnormalities   -C/w telemetry.  -DASH diet.

## 2020-02-24 NOTE — PROGRESS NOTE ADULT - PROBLEM SELECTOR PLAN 4
-C/w famotidine.
-C/w famotidine.
-Low platelets and some low albumin noted. ?underlying liver disease.   -Check UA.   -Monitor CBC and for any bleeding for now.   -Consider further work up inpatient vs outpatient. Monitor for now.
-Low platelets and some low albumin noted. ?underlying liver disease.   -UA without any proteinuria.   -Monitor CBC and for any bleeding for now.   -Consider further work up outpatient. Monitor for now.

## 2020-02-24 NOTE — PROGRESS NOTE ADULT - PROBLEM SELECTOR PLAN 1
-With significant cardiac hx during this admission  -EKG changes noted from prior EKG; ST changes noted on V1 and V2  -Follow up with CE  -Follow up with Cardiology recs
Cath without intervention.   -continue with DAPT, statin  -outpatient cardiology follow up.
-S/p VT ablation on 2/19. Per EPS, no need for further heparin drip.   -ICD battery change on 2/21. -Periprocedural vancomycin. -F/u CXR. -Monitor on telemetry. -F/u EKG post-procedure.   -C/w metoprolol, f/u EP recs regarding titrating.  -Monitor and replete lytes PRN.   -Tylenol PRN for any chest wall pain in ICD site area.
-S/p VT ablation on 2/19. Per EPS, no need for further heparin drip.   -ICD battery change on 2/21. -S/p periprocedural vancomycin. -F/u repeat CXR. -Monitor on telemetry. -F/u EKG post-procedure and am.   -C/w metoprolol; cardio recs appreciated will convert to Toprol XL on DC.  -Monitor and replete lytes PRN.   -Tylenol PRN for any chest wall pain in ICD site area. -Can use Ice packs for site swelling as needed.

## 2020-02-24 NOTE — PROGRESS NOTE ADULT - PROBLEM SELECTOR PLAN 3
s/p [facility] 2/19 showing mild-moderate disease in distal vessels with no interventions  - s/p ASA, Plavix load  - c/w asa 81 mg daily, plavix 75 mg daily, and atorvastatin 80mg QHS.   - C/w Metoprolol 12.5mg Q6H for now, will convert to Toprol XL 50mg daily on DC.   - TTE shows EF of 30-35% with segmental abnormalities   -C/w telemetry.  -DASH diet.
s/p [facility] 2/19 showing mild-moderate disease in distal vessels with no interventions  - s/p ASA, Plavix load  - c/w asa 81 mg daily, plavix 75 mg daily, and atorvastatin 80mg QHS.   - C/w Metoprolol 12.5mg Q6H for now, will convert to Toprol XL 50mg daily on DC.   - TTE shows EF of 30-35% with segmental abnormalities   -C/w telemetry.  -DASH diet.
-C/w famotidine.
-C/w famotidine.

## 2020-02-24 NOTE — PROGRESS NOTE ADULT - SUBJECTIVE AND OBJECTIVE BOX
SUBJ:  No acute events overnight. Denies chest pain and shortness of breath. Discharge disposition today.     Home Medications:  aspirin 81 mg oral tablet, chewable: 1 tab(s) orally once a day (23 Dec 2019 03:00)  atorvastatin 80 mg oral tablet: 1 tab(s) orally once a day (at bedtime) (23 Dec 2019 03:00)  clopidogrel 75 mg oral tablet: 1 tab(s) orally once a day (23 Dec 2019 03:00)  famotidine 20 mg oral tablet: 1 tab(s) orally once a day (23 Dec 2019 03:00)  lisinopril 10 mg oral tablet: 10.5 milligram(s) orally once a day (at bedtime) (23 Dec 2019 03:00)  metoprolol tartrate 75 mg oral tablet: 1 tab(s) orally once a day (23 Dec 2019 03:00)      MEDICATIONS  (STANDING):  aspirin enteric coated 81 milliGRAM(s) Oral daily  atorvastatin 80 milliGRAM(s) Oral at bedtime  chlorhexidine 2% Cloths 1 Application(s) Topical <User Schedule>  clopidogrel Tablet 75 milliGRAM(s) Oral daily  famotidine    Tablet 20 milliGRAM(s) Oral daily  metoprolol tartrate 12.5 milliGRAM(s) Oral every 6 hours  polyethylene glycol 3350 17 Gram(s) Oral every 24 hours  senna 2 Tablet(s) Oral at bedtime    MEDICATIONS  (PRN):  acetaminophen   Tablet .. 975 milliGRAM(s) Oral every 6 hours PRN Mild Pain (1 - 3)      Vital Signs Last 24 Hrs  T(C): 36.6 (24 Feb 2020 04:45), Max: 36.6 (23 Feb 2020 22:03)  T(F): 97.8 (24 Feb 2020 04:45), Max: 97.9 (23 Feb 2020 22:03)  HR: 84 (24 Feb 2020 04:45) (79 - 94)  BP: 108/67 (24 Feb 2020 04:45) (108/67 - 124/66)  BP(mean): --  RR: 18 (24 Feb 2020 04:45) (18 - 18)  SpO2: 97% (24 Feb 2020 04:45) (97% - 97%)    REVIEW OF SYSTEMS:  As per HPI, otherwise unremarkable.     PHYSICAL EXAM:  Appearance: No acute distress  Eyes: PERRL, EOMI, pink conjunctiva  HENT: Normal oral mucosa  Cardiovascular: RRR, S1, S2, no murmurs, rubs, or gallops; no edema; no JVD  Respiratory: Diminished R base, likely atelectasis  Gastrointestinal: soft, non-tender, non-distended with normal bowel sounds  Musculoskeletal: No clubbing; no joint deformity   Neurologic: Non-focal  Lymphatic: No lymphadenopathy  Psychiatry: AAOx3, mood & affect appropriate  Skin: No rashes, ecchymoses, or cyanosis. ICD site with dressing.    TELEMETRY: 2/24/2020  NSR     CARDIAC MARKERS ( 23 Feb 2020 10:50 )  x     / x     / 116 U/L / x     / 2.6 ng/mL    IMPRESSION AND PLAN:  63 yo male w h/o ischemic cardiomyopathy s/p ICD, CAD (AWMI s/p BMS to LAD in 2013), transfer to [facility] from outside hospital after syncope secondary to VT s/p DCCV; undergoing LHC demonstrating mild-moderate distal disease and ultimately having VT ablation 2/19, ICD batter change 2/21.      1) Ventricular Tachycardia  s/p VT ablation 2/19 and ICD gen change yesterday  Sinus rhythm on telemetry with no ectopy    ·	Continue medical management with metoprolol succinate 50 mg daily.     2) Ischemic Cardiomyopathy/HFrEF  Euvolemic on exam.   EF 25-30%    ·	Continue medical management with metoprolol succinate 50 mg daily. Consider adding ACEi/ARB outpatient as blood pressure soft inpatient     3) CAD  s/p LHC with non-obstructive CAD  Asymptomatic     ·	Continue medical management with aspirin 81 mg daily ,clopidogrel 75 mg daily, and atorvastatin 80 mg nightly.      Discharge with outpatient follow up EP 1-2 weeks.    Shena Chatterjee MD, MPH, NAHUN, RPVI, FACC  Cardiovascular Specialist Attending  [facility]  C: [phone]   E: [email or web address]

## 2020-02-24 NOTE — DISCHARGE NOTE PROVIDER - NSDCFUADDINST_GEN_ALL_CORE_FT
Please follow up with your cardiologist or primary care physician to repeat your CBC to ensure your platelet count is stable.

## 2020-02-24 NOTE — DISCHARGE NOTE PROVIDER - PROVIDER TOKENS
FREE:[LAST:[[facility]],PHONE:[phone]],FAX:[(   )    -],ADDRESS:[[address] [place] [address]],SCHEDULEDAPPT:[02/28/2020],SCHEDULEDAPPTTIME:[08:00 AM]],PROVIDER:[TOKEN:[[id number]],SCHEDULEDAPPT:[03/27/2020],SCHEDULEDAPPTTIME:[10:40 AM]] PROVIDER:[TOKEN:[[id number]],SCHEDULEDAPPT:[03/27/2020],SCHEDULEDAPPTTIME:[10:40 AM]],FREE:[LAST:[[facility]],PHONE:[phone]],FAX:[(   )    -],ADDRESS:[[address] [place] [address]],SCHEDULEDAPPT:[02/28/2020],SCHEDULEDAPPTTIME:[08:00 AM]],FREE:[LAST:[[facility]],FIRST:[Dr. Mikhail Aparicio],PHONE:[phone]],FAX:[(   )    -],ADDRESS:[[address] [place] [address]  [id number]],SCHEDULEDAPPT:[03/02/2020],SCHEDULEDAPPTTIME:[02:30 PM],ESTABLISHEDPATIENT:[T]]

## 2020-02-24 NOTE — PROGRESS NOTE ADULT - SUBJECTIVE AND OBJECTIVE BOX
Authored by Dr. Ramírez Baker  Pager # [phone]  Patient is a 64y old  Male who presents with a chief complaint of chest pain (2020 10:19)      SUBJECTIVE / OVERNIGHT EVENTS:  No events overnight. Patient offers no chest pain. No SOB, +Right groin pain at site of cath.     ROS:  All other review of systems negative    Allergies    No Known Allergies    Intolerances        MEDICATIONS  (STANDING):  aspirin enteric coated 81 milliGRAM(s) Oral daily  atorvastatin 80 milliGRAM(s) Oral at bedtime  chlorhexidine 2% Cloths 1 Application(s) Topical <User Schedule>  chlorhexidine 4% Liquid 1 Application(s) Topical <User Schedule>  clopidogrel Tablet 75 milliGRAM(s) Oral daily  famotidine    Tablet 20 milliGRAM(s) Oral daily  metoprolol tartrate 12.5 milliGRAM(s) Oral every 6 hours  polyethylene glycol 3350 17 Gram(s) Oral every 24 hours  senna 2 Tablet(s) Oral at bedtime    MEDICATIONS  (PRN):  acetaminophen   Tablet .. 975 milliGRAM(s) Oral every 6 hours PRN Mild Pain (1 - 3)    Vital Signs Last 24 Hrs  T(C): 36.7 (2020 12:24), Max: 36.7 (2020 12:24)  T(F): 98 (2020 12:24), Max: 98 (2020 12:24)  HR: 85 (2020 12:24) (79 - 94)  BP: 115/68 (2020 12:24) (108/67 - 124/66)  BP(mean): --  RR: 18 (2020 12:24) (18 - 18)  SpO2: 97% (2020 12:24) (97% - 97%)      I&O's Summary    2020 07:  -  2020 07:00  --------------------------------------------------------  IN: 1400 mL / OUT: 1401 mL / NET: -1 mL    2020 07:01  -  2020 14:23  --------------------------------------------------------  IN: 180 mL / OUT: 0 mL / NET: 180 mL          PHYSICAL EXAM:  GENERAL: NAD, well-developed  HEAD:  Atraumatic, Normocephalic  EYES: anicteric sclera  NECK: Supple, No JVD  CHEST/LUNG: Clear to auscultation bilaterally; No wheeze  HEART: Regular rate and rhythm; No murmurs, rubs, or gallops  ABDOMEN: Soft, Nontender, Nondistended; Bowel sounds present  EXTREMITIES:  2+ Peripheral Pulses, No clubbing, cyanosis, or edema  : Right groin site with small hematoma overlying pulse.   NEUROLOGY: AAOx3, non-focal  PSYCH: calm  SKIN: Right upper chest ICD site swollen with a few blisters noted.     LABS:                     CARDIAC MARKERS ( 2020 10:50 )  x     / x     / 116 U/L / x     / 2.6 ng/mL      Urinalysis Basic - ( 2020 08:26 )    Color: Light Yellow / Appearance: Clear / S.018 / pH: x  Gluc: x / Ketone: Negative  / Bili: Negative / Urobili: Negative   Blood: x / Protein: Negative / Nitrite: Negative   Leuk Esterase: Negative / RBC: 4 /hpf / WBC 1 /HPF   Sq Epi: x / Non Sq Epi: 0 /hpf / Bacteria: Negative        RADIOLOGY & ADDITIONAL TESTS:  Results Reviewed:   Imaging Personally Reviewed:  Electrocardiogram Personally Reviewed:    COORDINATION OF CARE:  Care Discussed with Consultants/Other Providers [Y/N]: Dr. Chatterjee  Prior or Outpatient Records Reviewed [Y/N]:

## 2020-02-24 NOTE — PROGRESS NOTE ADULT - ATTENDING COMMENTS
Lenny Beard MD  Division of [facility] Medicine  Cell: [phone]  Pager: [phone]  Office: [phone]
Lenny Beard MD  Division of [facility] Medicine  Cell: [phone]  Pager: [phone]  Office: [phone]
Agree with plan as outlined above.
Patient with VT status post external shock as it was below the ICD detection rate.  Device reprogrammed.  Cardiac cath without new ischemia in patient with known CAD/ischemic cardiomyopathy.  VT ablation in patient with HFrEF and monomorphic VT.    Uptitrate guideline directed medical therapies including beta-blocker as tolerated.
He has had some non sustained VT and will need to be observed on telemetry to make sure that sustained VT does not recur. Please resume beta blockers.    His present ICD generator is very superficial with the lateral margin tethered to the skin and is at risk of erosion and infection. This should be revised for a deeper implant. The present ICD generator is 7 years old and he will thus, need a generator replacement.
Patient with VT status post external shock as it was below the ICD detection rate.  Device reprogrammed.  Plan for cardiac cath to evaluate for ischemia in patient with known CAD/ischemic cardiomyopathy.  If no significant new coronary disease, will plan for VT ablation in patient with HFrEF and monomorphic VT.
Patient with VT status post external shock as it was below the ICD detection rate.  Device reprogrammed.  Plan for cardiac cath to evaluate for ischemia in patient with known CAD/ischemic cardiomyopathy.  If no significant new coronary disease, will plan for VT ablation in patient with HFrEF and monomorphic VT.
#right groin hematoma  -US to r/o pseduoaneurysm.     If US negative, stable for discharge. Has outpatient appointment with EP n 2/28 already made.     D/c planning 40 minutes.

## 2020-02-24 NOTE — DISCHARGE NOTE PROVIDER - NSDCCPCAREPLAN_GEN_ALL_CORE_FT
PRINCIPAL DISCHARGE DIAGNOSIS  Diagnosis: Ventricular tachycardia  Assessment and Plan of Treatment: s/p VT Ablation   s/p ICD Generator Change.   Follow up with EP.    Continue present medication regimen.      SECONDARY DISCHARGE DIAGNOSES  Diagnosis: CAD in native artery  Assessment and Plan of Treatment: Coronary artery disease is a condition where the arteries the supply the heart muscle get clogges with fatty deposits & puts you at risk for a heart attack  Call your doctor if you have any new pain, pressure, or discomfort in the center of your chest, pain, tingling or discomfort in arms, back, neck, jaw, or stomach, shortness of breath, nausea, vomiting, burping or heartburn, sweating, cold and clammy skin, racing or abnormal heartbeat for more than 10 minutes or if they keep coming & going.  Call 911 and do not tr to get to hospital by care  You can help yourself with lefestyle changes (quitting smoking if you smoke), eat lots of fruits & vegetables & low fat dairy products, not a lot of meat & fatty foods, walk or some form of physical activity most days of the week, lose weight if you are overweight  Take your cardiac medication as prescribed to lower cholesterol, to lower blood pressure, aspirin to prevent blood clots, and diabetes control  Make sure to keep appointments with doctor for cardiac follow up care      Diagnosis: Ischemic cardiomyopathy  Assessment and Plan of Treatment: Continue present medication regimen .   Follow up with PMD in 1 week.

## 2020-02-24 NOTE — DISCHARGE NOTE PROVIDER - HOSPITAL COURSE
To be done. 64 year old M with hx of AWMI s/p BMS to LAD in 2013, severe ICM s/p ICD (medtronic); transferred from [place] ED, presented with syncope, found to be hypotensive, and VT, s/p DCCV x 1, NSTEMI, s/p ASA, plavix load, and heparin gtt. Off of pressor support. s/p LHC showing mild-moderate distal disease. s/p VT ablation 2/19.  ICD battery change on 2/21.   Pt was seen by Cardiology and EP.  Complained of swelling of the right groin.  s/p Doppler of which was negative for pseudoanuerysm.  pt will be disharged to home today  on Toprol XL 50 mg daily. ICD site without swelling.  ICD post care instructions reinforced.   He will follow up in the EP Clinic on 2/28 and be seen by Dr. Barton on 3/27/2020.  An appointment has also been made for pt to be seen in the Cardiology Clinic on 3/2/2020 at 1430 with Dr. Mikhail Aparicio.  Pt verbalize understanding of discharge plan and medication reconciliation.  Pt is hemodynamically stable for discharge to home today.

## 2020-02-24 NOTE — DISCHARGE NOTE PROVIDER - NSDCMRMEDTOKEN_GEN_ALL_CORE_FT
aspirin 81 mg oral tablet, chewable: 1 tab(s) orally once a day  atorvastatin 80 mg oral tablet: 1 tab(s) orally once a day (at bedtime)  clopidogrel 75 mg oral tablet: 1 tab(s) orally once a day  famotidine 20 mg oral tablet: 1 tab(s) orally once a day  lisinopril 10 mg oral tablet: 10.5 milligram(s) orally once a day (at bedtime)  metoprolol tartrate 75 mg oral tablet: 1 tab(s) orally once a day aspirin 81 mg oral tablet, chewable: 1 tab(s) orally once a day  atorvastatin 80 mg oral tablet: 1 tab(s) orally once a day (at bedtime)  clopidogrel 75 mg oral tablet: 1 tab(s) orally once a day  famotidine 20 mg oral tablet: 1 tab(s) orally once a day  Toprol-XL 50 mg oral tablet, extended release: 1 tab(s) orally once a day

## 2020-02-24 NOTE — DISCHARGE NOTE NURSING/CASE MANAGEMENT/SOCIAL WORK - PATIENT PORTAL LINK FT
You can access the FollowMyHealth Patient Portal offered by [facility] by registering at the following website: [email or web address]. By joining [facility]’s FollowMyHealth portal, you will also be able to view your health information using other applications (apps) compatible with our system.

## 2020-02-24 NOTE — PROGRESS NOTE ADULT - PROBLEM SELECTOR PROBLEM 2
Ventricular tachycardia
Ventricular tachycardia
NSTEMI (non-ST elevated myocardial infarction)
NSTEMI (non-ST elevated myocardial infarction)

## 2020-02-28 ENCOUNTER — APPOINTMENT (OUTPATIENT)
Dept: ELECTROPHYSIOLOGY | Facility: CLINIC | Age: 65
End: 2020-02-28
Payer: MEDICAID

## 2020-02-28 VITALS
HEIGHT: 65 IN | SYSTOLIC BLOOD PRESSURE: 122 MMHG | OXYGEN SATURATION: 9 % | DIASTOLIC BLOOD PRESSURE: 76 MMHG | HEART RATE: 71 BPM

## 2020-02-28 PROCEDURE — 99024 POSTOP FOLLOW-UP VISIT: CPT

## 2020-02-28 PROCEDURE — 93282 PRGRMG EVAL IMPLANTABLE DFB: CPT

## 2020-03-04 ENCOUNTER — EMERGENCY (EMERGENCY)
Facility: HOSPITAL | Age: 65
LOS: 1 days | Discharge: HOME | End: 2020-03-04
Attending: EMERGENCY MEDICINE | Admitting: EMERGENCY MEDICINE
Payer: MEDICAID

## 2020-03-04 VITALS
HEART RATE: 75 BPM | WEIGHT: 156.09 LBS | RESPIRATION RATE: 16 BRPM | DIASTOLIC BLOOD PRESSURE: 90 MMHG | SYSTOLIC BLOOD PRESSURE: 148 MMHG | HEIGHT: 65 IN | TEMPERATURE: 99 F | OXYGEN SATURATION: 99 %

## 2020-03-04 VITALS
OXYGEN SATURATION: 98 % | DIASTOLIC BLOOD PRESSURE: 74 MMHG | HEART RATE: 66 BPM | TEMPERATURE: 99 F | SYSTOLIC BLOOD PRESSURE: 122 MMHG | RESPIRATION RATE: 17 BRPM

## 2020-03-04 DIAGNOSIS — Z98.89 OTHER SPECIFIED POSTPROCEDURAL STATES: Chronic | ICD-10-CM

## 2020-03-04 LAB
ALBUMIN SERPL ELPH-MCNC: 3.3 G/DL — SIGNIFICANT CHANGE UP (ref 3.3–5)
ALP SERPL-CCNC: 73 U/L — SIGNIFICANT CHANGE UP (ref 40–120)
ALT FLD-CCNC: 39 U/L — SIGNIFICANT CHANGE UP (ref 12–78)
ANION GAP SERPL CALC-SCNC: 5 MMOL/L — SIGNIFICANT CHANGE UP (ref 5–17)
APTT BLD: 29.7 SEC — SIGNIFICANT CHANGE UP (ref 28.5–37)
AST SERPL-CCNC: 21 U/L — SIGNIFICANT CHANGE UP (ref 15–37)
BASOPHILS # BLD AUTO: 0.03 K/UL — SIGNIFICANT CHANGE UP (ref 0–0.2)
BASOPHILS NFR BLD AUTO: 0.4 % — SIGNIFICANT CHANGE UP (ref 0–2)
BILIRUB SERPL-MCNC: 0.7 MG/DL — SIGNIFICANT CHANGE UP (ref 0.2–1.2)
BUN SERPL-MCNC: 26 MG/DL — HIGH (ref 7–23)
CALCIUM SERPL-MCNC: 8.7 MG/DL — SIGNIFICANT CHANGE UP (ref 8.5–10.1)
CHLORIDE SERPL-SCNC: 111 MMOL/L — HIGH (ref 96–108)
CO2 SERPL-SCNC: 26 MMOL/L — SIGNIFICANT CHANGE UP (ref 22–31)
CREAT SERPL-MCNC: 0.94 MG/DL — SIGNIFICANT CHANGE UP (ref 0.5–1.3)
EOSINOPHIL # BLD AUTO: 0.1 K/UL — SIGNIFICANT CHANGE UP (ref 0–0.5)
EOSINOPHIL NFR BLD AUTO: 1.4 % — SIGNIFICANT CHANGE UP (ref 0–6)
GLUCOSE SERPL-MCNC: 102 MG/DL — HIGH (ref 70–99)
HCT VFR BLD CALC: 41.8 % — SIGNIFICANT CHANGE UP (ref 39–50)
HGB BLD-MCNC: 13.9 G/DL — SIGNIFICANT CHANGE UP (ref 13–17)
IMM GRANULOCYTES NFR BLD AUTO: 0.4 % — SIGNIFICANT CHANGE UP (ref 0–1.5)
INR BLD: 1.17 RATIO — HIGH (ref 0.88–1.16)
LYMPHOCYTES # BLD AUTO: 0.95 K/UL — LOW (ref 1–3.3)
LYMPHOCYTES # BLD AUTO: 13.3 % — SIGNIFICANT CHANGE UP (ref 13–44)
MCHC RBC-ENTMCNC: 29.3 PG — SIGNIFICANT CHANGE UP (ref 27–34)
MCHC RBC-ENTMCNC: 33.3 GM/DL — SIGNIFICANT CHANGE UP (ref 32–36)
MCV RBC AUTO: 88.2 FL — SIGNIFICANT CHANGE UP (ref 80–100)
MONOCYTES # BLD AUTO: 0.71 K/UL — SIGNIFICANT CHANGE UP (ref 0–0.9)
MONOCYTES NFR BLD AUTO: 9.9 % — SIGNIFICANT CHANGE UP (ref 2–14)
NEUTROPHILS # BLD AUTO: 5.32 K/UL — SIGNIFICANT CHANGE UP (ref 1.8–7.4)
NEUTROPHILS NFR BLD AUTO: 74.6 % — SIGNIFICANT CHANGE UP (ref 43–77)
NRBC # BLD: 0 /100 WBCS — SIGNIFICANT CHANGE UP (ref 0–0)
PLATELET # BLD AUTO: 218 K/UL — SIGNIFICANT CHANGE UP (ref 150–400)
POTASSIUM SERPL-MCNC: 3.9 MMOL/L — SIGNIFICANT CHANGE UP (ref 3.5–5.3)
POTASSIUM SERPL-SCNC: 3.9 MMOL/L — SIGNIFICANT CHANGE UP (ref 3.5–5.3)
PROT SERPL-MCNC: 7.1 G/DL — SIGNIFICANT CHANGE UP (ref 6–8.3)
PROTHROM AB SERPL-ACNC: 13.2 SEC — HIGH (ref 10–12.9)
RBC # BLD: 4.74 M/UL — SIGNIFICANT CHANGE UP (ref 4.2–5.8)
RBC # FLD: 14.1 % — SIGNIFICANT CHANGE UP (ref 10.3–14.5)
SODIUM SERPL-SCNC: 142 MMOL/L — SIGNIFICANT CHANGE UP (ref 135–145)
WBC # BLD: 7.14 K/UL — SIGNIFICANT CHANGE UP (ref 3.8–10.5)
WBC # FLD AUTO: 7.14 K/UL — SIGNIFICANT CHANGE UP (ref 3.8–10.5)

## 2020-03-04 PROCEDURE — 80053 COMPREHEN METABOLIC PANEL: CPT

## 2020-03-04 PROCEDURE — 36415 COLL VENOUS BLD VENIPUNCTURE: CPT

## 2020-03-04 PROCEDURE — 85027 COMPLETE CBC AUTOMATED: CPT

## 2020-03-04 PROCEDURE — 71045 X-RAY EXAM CHEST 1 VIEW: CPT

## 2020-03-04 PROCEDURE — 93005 ELECTROCARDIOGRAM TRACING: CPT

## 2020-03-04 PROCEDURE — 93971 EXTREMITY STUDY: CPT

## 2020-03-04 PROCEDURE — 85610 PROTHROMBIN TIME: CPT

## 2020-03-04 PROCEDURE — 85730 THROMBOPLASTIN TIME PARTIAL: CPT

## 2020-03-04 PROCEDURE — 99285 EMERGENCY DEPT VISIT HI MDM: CPT

## 2020-03-04 PROCEDURE — 99284 EMERGENCY DEPT VISIT MOD MDM: CPT | Mod: 25

## 2020-03-04 PROCEDURE — 93010 ELECTROCARDIOGRAM REPORT: CPT

## 2020-03-04 PROCEDURE — 93971 EXTREMITY STUDY: CPT | Mod: 26,RT

## 2020-03-04 PROCEDURE — 71045 X-RAY EXAM CHEST 1 VIEW: CPT | Mod: 26

## 2020-03-04 RX ORDER — APIXABAN 2.5 MG/1
10 TABLET, FILM COATED ORAL ONCE
Refills: 0 | Status: COMPLETED | OUTPATIENT
Start: 2020-03-04 | End: 2020-03-04

## 2020-03-04 RX ORDER — ACETAMINOPHEN 500 MG
650 TABLET ORAL ONCE
Refills: 0 | Status: COMPLETED | OUTPATIENT
Start: 2020-03-04 | End: 2020-03-04

## 2020-03-04 RX ORDER — LIDOCAINE 4 G/100G
1 CREAM TOPICAL ONCE
Refills: 0 | Status: COMPLETED | OUTPATIENT
Start: 2020-03-04 | End: 2020-03-04

## 2020-03-04 RX ORDER — APIXABAN 2.5 MG/1
2 TABLET, FILM COATED ORAL
Qty: 28 | Refills: 0
Start: 2020-03-04 | End: 2020-03-10

## 2020-03-04 RX ORDER — APIXABAN 2.5 MG/1
1 TABLET, FILM COATED ORAL
Qty: 60 | Refills: 0
Start: 2020-03-04 | End: 2020-04-02

## 2020-03-04 RX ADMIN — APIXABAN 10 MILLIGRAM(S): 2.5 TABLET, FILM COATED ORAL at 15:28

## 2020-03-04 RX ADMIN — LIDOCAINE 1 PATCH: 4 CREAM TOPICAL at 12:29

## 2020-03-04 RX ADMIN — Medication 650 MILLIGRAM(S): at 13:29

## 2020-03-04 RX ADMIN — Medication 650 MILLIGRAM(S): at 12:29

## 2020-03-04 NOTE — ED PROVIDER NOTE - NSFOLLOWUPINSTRUCTIONS_ED_ALL_ED_FT
follow up with your cardiologist Dr Christiansen  call today to arrange follow up  follow up with vascular Dr Steve Valente  call today to arrange follow up  start Eliquis 5 mg twice a day tomorrow  continue your plavix medication,  stop aspirin  if any concerns return to ED

## 2020-03-04 NOTE — ED PROVIDER NOTE - PROGRESS NOTE DETAILS
call out to Vascular, Dr Lynn Valente spoke with Dr Lynn odonnell, vascular,  case discussed, informed of dvt right arm, result read, informed patient is on plavix and asa, hx discussed, new defibrillator right chest , advised patient to d/c aspirin, continue plavix, start eliquis 5 mg bid, can follow up in her office gave starting dose 10 mg in ED of Eliquis  rx sent to pharmacy, Eliquis 5 mg bid

## 2020-03-04 NOTE — ED PROVIDER NOTE - UPPER EXTREMITY EXAM, RIGHT
right shoulder pain with from, nvi, minima generalized right arm inflammation, nvi, from, pain with flexion, extension, abduction, adduction right shoulder/TENDERNESS

## 2020-03-04 NOTE — ED ADULT NURSE NOTE - OBJECTIVE STATEMENT
Presents to ER with right arm pain and swelling. Recently had a pacemaker change at [place], surgical site C,D,I but swollen. Radial pulse bounding.

## 2020-03-04 NOTE — ED PROVIDER NOTE - ATTENDING CONTRIBUTION TO CARE
I have personally performed a face to face diagnostic evaluation on this patient.  I have reviewed the PA note and agree with the history, exam, and plan of care, except as noted.  History and Exam by me shows  right upper arm swelling x 3 days.  s/p icd replaced 2 weeks ago.  no cp, sob.  lungs- cta bl.  heart s1s2.   us sig rue dvt

## 2020-03-04 NOTE — ED PROVIDER NOTE - CLINICAL SUMMARY MEDICAL DECISION MAKING FREE TEXT BOX
right arm pain with rom, minimal generalized sts, resolving right upper chest wall ecchymosis, dry linear wound, recent defibrillator placement surgery, 1.5 wks ago,  will obtain labs, doppler, cxr, ekg, give pain med

## 2020-03-04 NOTE — ED PROVIDER NOTE - PROVIDER TOKENS
FREE:[LAST:[Dr Steve Valente, Vascular],PHONE:[phone]],FAX:[(   )    -],ADDRESS:[[address] [place] [address]],FOLLOWUP:[1-3 Days]],FREE:[LAST:[Cardio Dr Lisandro Christiansen],PHONE:[(   )    -],FAX:[(   )    -],FOLLOWUP:[1-3 Days],ESTABLISHEDPATIENT:[T]]

## 2020-03-04 NOTE — ED PROVIDER NOTE - PATIENT PORTAL LINK FT
You can access the FollowMyHealth Patient Portal offered by [facility] by registering at the following website: [email or web address]. By joining [facility]’s FollowMyHealth portal, you will also be able to view your health information using other applications (apps) compatible with our system.

## 2020-03-04 NOTE — ED PROVIDER NOTE - CARE PROVIDER_API CALL
Dr Steve Valente, Vascular,   [address], [place]  Phone: [phone]  Fax: (   )    -  Follow Up Time: 1-3 Days    Cardio Dr Lisandro Christiansen,   Phone: (   )    -  Fax: (   )    -  Established Patient  Follow Up Time: 1-3 Days

## 2020-03-04 NOTE — ED PROVIDER NOTE - OBJECTIVE STATEMENT
64 y male presents with right arm pain with rom, states he feels the arm is "swollen", states he always has had some degree of swelling to the right arm after the 1st defibrillator was placed several years ago,  states 1.5 weeks ago, he had a new defibrillator placed right chest wall,  was seen in ED 2 weeks ago and transferred to [place] ED for Vtach.  states he is presently on plavix and asa, denies chest pain, no sob,  states last night he felt he "twisted" his right arm in his sleep.  states he takes otc tylenol for pain, did not take any tylenol today.  Cardio Dr Christiansen, PMD Dr Kellie Bear  former smoker

## 2020-03-04 NOTE — PHARMACOTHERAPY INTERVENTION NOTE - COMMENTS
Patient with right arm DVT, JAIRO FOSS requesting eliquis dosing - informed her 10mg BID given as loading dose for DVT treatment, followed by 5mg BID. Entered 1 time stat order

## 2020-03-10 ENCOUNTER — NON-APPOINTMENT (OUTPATIENT)
Age: 65
End: 2020-03-10

## 2020-03-10 ENCOUNTER — OUTPATIENT (OUTPATIENT)
Dept: OUTPATIENT SERVICES | Facility: HOSPITAL | Age: 65
LOS: 1 days | End: 2020-03-10
Payer: MEDICAID

## 2020-03-10 ENCOUNTER — APPOINTMENT (OUTPATIENT)
Dept: CARDIOLOGY | Facility: HOSPITAL | Age: 65
End: 2020-03-10

## 2020-03-10 VITALS
OXYGEN SATURATION: 98 % | HEIGHT: 65 IN | HEART RATE: 71 BPM | DIASTOLIC BLOOD PRESSURE: 81 MMHG | SYSTOLIC BLOOD PRESSURE: 124 MMHG

## 2020-03-10 DIAGNOSIS — Z98.89 OTHER SPECIFIED POSTPROCEDURAL STATES: Chronic | ICD-10-CM

## 2020-03-10 DIAGNOSIS — I25.10 ATHEROSCLEROTIC HEART DISEASE OF NATIVE CORONARY ARTERY WITHOUT ANGINA PECTORIS: ICD-10-CM

## 2020-03-10 PROCEDURE — G0463: CPT

## 2020-03-10 RX ORDER — SACUBITRIL AND VALSARTAN 24; 26 MG/1; MG/1
24-26 TABLET, FILM COATED ORAL TWICE DAILY
Qty: 60 | Refills: 3 | Status: DISCONTINUED | COMMUNITY
Start: 2020-03-10 | End: 2020-03-10

## 2020-03-10 RX ORDER — METOPROLOL SUCCINATE 50 MG/1
50 TABLET, EXTENDED RELEASE ORAL
Qty: 90 | Refills: 3 | Status: DISCONTINUED | COMMUNITY
Start: 2019-02-13 | End: 2020-03-10

## 2020-03-10 RX ORDER — ASPIRIN ENTERIC COATED TABLETS 81 MG 81 MG/1
81 TABLET, DELAYED RELEASE ORAL DAILY
Qty: 90 | Refills: 3 | Status: DISCONTINUED | COMMUNITY
Start: 2018-04-23 | End: 2020-03-10

## 2020-03-11 NOTE — ASSESSMENT
[FreeTextEntry1] : 62 y/o M w/ hx of STEMI 12/2013 s/p BMS to proximal LAD, and ICM HFrEF 30-35% s/p ICD presents for follow up after a recent hospitalization for syncope. He went to [facility] where he found to be hypotensive and in VT s/p DCCV x 1 ( VT ~190's bpm (below VT detect rate). He transferred to [facility] for further work up.\par \par He had a LHC showing mild-moderate distal disease and subsequently underwent VT ablation 2/19 and ICD gen change on 2/21 w/ pocket revision. He was discharged with no more episodes of VT. His course was complicated at home with his right arm swelling. He went [facility] again where he was evaluated in the ER w/ US which showed extensive deep venous thrombosis right subclavian, axillary and brachial veins. He was started on Eliquis and aspirin stopped. Also was arranged for outpatient f/u with vascular Dr. Calle. \par \par He was seen in device clinic recently with no new events on his ICD. He also feels well today. Arm swelling improved. No palpiations, no SOB, chest pain, SOLORZANO. \par \par #ICM - appears to be stage B. Denies any history of CHF or symptoms. Needs optimization of GDMT however very reluctant to change. \par - add back Lisinopril, continue Metoprolol \par - has been on Plavix for quite some time. Will continue for now. Aspirin held now that he is on Eliquis. \par - repeat TTE ordered by Dr. Christiansen. To be followed up and RTC in June\par \par \par VT ablation - Recent device interrogation with no events. Has appt with Dr. Guilherme Barton on 3/27. \par \par Right UE DVT - improved swelling. Possibly 2/2 to recent ICD revision\par - continue with Eliquis. To be f/u with Dr. Barton and vascular.\par \par #HLD\par -c/w statin\par \par #HTN\par -Toprol, Lisinopril as above\par -will consider starting spironolactone pending repeat TTE and BP checks\par \par EULOGIO Varma MD\par Cardiology Fellow

## 2020-03-11 NOTE — HISTORY OF PRESENT ILLNESS
[FreeTextEntry1] : 64 y/o M w/ hx of STEMI 12/2013 s/p BMS to proximal LAD, and ICM HFrEF 30-35% s/p ICD presents for follow up after a recent hospitalization for syncope. He went to [facility] where he found to be hypotensive and in VT s/p DCCV x 1 ( VT ~190's bpm (below VT detect rate). He transferred to [facility] for further work up.\par \par  He had a LHC showing mild-moderate distal disease and subsequently underwent VT ablation 2/19 and ICD gen change on 2/21 w/ pocket revision. He was discharged with no more episodes of VT. His course was complicated at home with his right arm swelling. He went [facility] again where he was evaluated in the ER w/ US which showed extensive deep venous thrombosis right subclavian, axillary and brachial veins. He was started on Eliquis and aspirin stopped. Also was arranged for outpatient f/u with vascular Dr. Calle. \par \par He was seen in device clinic recently with no new events on his ICD. He also feels well today. Arm swelling improved. No palpiations, no SOB, chest pain, SOLORZANO. \par \par Discharge meds:\par \par aspirin 81 mg oral tablet, chewable: 1 tab(s) orally once a day\par atorvastatin 80 mg oral tablet: 1 tab(s) orally once a day (at bedtime)\par clopidogrel 75 mg oral tablet: 1 tab(s) orally once a day\par famotidine 20 mg oral tablet: 1 tab(s) orally once a day\par Toprol-XL 50 mg oral tablet, extended release: 1 tab(s) orally once a day\par

## 2020-03-16 ENCOUNTER — APPOINTMENT (OUTPATIENT)
Dept: VASCULAR SURGERY | Facility: CLINIC | Age: 65
End: 2020-03-16
Payer: MEDICAID

## 2020-03-16 VITALS
SYSTOLIC BLOOD PRESSURE: 136 MMHG | TEMPERATURE: 98.2 F | HEART RATE: 69 BPM | HEIGHT: 65 IN | OXYGEN SATURATION: 99 % | WEIGHT: 153 LBS | RESPIRATION RATE: 16 BRPM | DIASTOLIC BLOOD PRESSURE: 79 MMHG | BODY MASS INDEX: 25.49 KG/M2

## 2020-03-16 PROCEDURE — 99203 OFFICE O/P NEW LOW 30 MIN: CPT

## 2020-03-16 NOTE — HISTORY OF PRESENT ILLNESS
[FreeTextEntry1] : 65 yo M with hx of CAD. Stenting 7 years ago recently transferred from [facility] to [facility] secondary to dizziness and new cardiac event. He was re-stented as well as VT-abladed. He states he used to have a defibrillator that eventually needed battery change. He developed RUE swelling during the process and was diagnosed with a RUE DVT involving up to SCV. His arm feels better now. He has been on eliquis.

## 2020-03-16 NOTE — VITALS
[Throbbing] : throbbing [FreeTextEntry3] : RUE-R chest [FreeTextEntry1] : elevation [FreeTextEntry2] : pressure over area

## 2020-03-16 NOTE — PHYSICAL EXAM
[2+] : left 2+ [FreeTextEntry1] : RUE _ 2 swelling, motor sens preserved, no prominent veins, R chest-defibrilator full ness lateral to device. NO erythema.

## 2020-03-16 NOTE — REVIEW OF SYSTEMS
[As Noted in HPI] : as noted in HPI [Limb Swelling] : limb swelling [Negative] : Heme/Lymph [FreeTextEntry9] : RUE swelling x 1 m

## 2020-03-18 DIAGNOSIS — I42.9 CARDIOMYOPATHY, UNSPECIFIED: ICD-10-CM

## 2020-03-27 ENCOUNTER — APPOINTMENT (OUTPATIENT)
Dept: ELECTROPHYSIOLOGY | Facility: CLINIC | Age: 65
End: 2020-03-27
Payer: MEDICAID

## 2020-03-27 ENCOUNTER — NON-APPOINTMENT (OUTPATIENT)
Age: 65
End: 2020-03-27

## 2020-03-27 VITALS
WEIGHT: 153 LBS | SYSTOLIC BLOOD PRESSURE: 131 MMHG | HEIGHT: 65 IN | HEART RATE: 71 BPM | OXYGEN SATURATION: 99 % | DIASTOLIC BLOOD PRESSURE: 80 MMHG | BODY MASS INDEX: 25.49 KG/M2

## 2020-03-27 PROCEDURE — 93000 ELECTROCARDIOGRAM COMPLETE: CPT | Mod: 59

## 2020-03-27 PROCEDURE — 93282 PRGRMG EVAL IMPLANTABLE DFB: CPT

## 2020-03-27 PROCEDURE — 99214 OFFICE O/P EST MOD 30 MIN: CPT

## 2020-03-27 NOTE — PHYSICAL EXAM
[General Appearance - Well Developed] : well developed [Normal Appearance] : normal appearance [Well Groomed] : well groomed [General Appearance - Well Nourished] : well nourished [No Deformities] : no deformities [General Appearance - In No Acute Distress] : no acute distress [Normal Conjunctiva] : the conjunctiva exhibited no abnormalities [Eyelids - No Xanthelasma] : the eyelids demonstrated no xanthelasmas [Normal Oral Mucosa] : normal oral mucosa [No Oral Pallor] : no oral pallor [No Oral Cyanosis] : no oral cyanosis [Normal Jugular Venous A Waves Present] : normal jugular venous A waves present [Normal Jugular Venous V Waves Present] : normal jugular venous V waves present [No Jugular Venous Morrissey A Waves] : no jugular venous morrissey A waves [Respiration, Rhythm And Depth] : normal respiratory rhythm and effort [Exaggerated Use Of Accessory Muscles For Inspiration] : no accessory muscle use [Auscultation Breath Sounds / Voice Sounds] : lungs were clear to auscultation bilaterally [Heart Rate And Rhythm] : heart rate and rhythm were normal [Heart Sounds] : normal S1 and S2 [Murmurs] : no murmurs present [FreeTextEntry1] : ICD site well healed. JVP normal, no edema [Abdomen Soft] : soft [Abdomen Tenderness] : non-tender [Abdomen Mass (___ Cm)] : no abdominal mass palpated [Abnormal Walk] : normal gait [Gait - Sufficient For Exercise Testing] : the gait was sufficient for exercise testing [Skin Color & Pigmentation] : normal skin color and pigmentation [] : no rash [No Venous Stasis] : no venous stasis [Skin Lesions] : no skin lesions [No Skin Ulcers] : no skin ulcer [No Xanthoma] : no  xanthoma was observed

## 2020-03-27 NOTE — HISTORY OF PRESENT ILLNESS
[FreeTextEntry1] : THis 64 year old man with a prior anterior septal MI and ischemic CM with LVEF 25% but NYHA 1 functional class presented with ventricular tachycardia below the rate cut off of the Medtronic single chamber ICD in February 2020. We took him to the EP lab and found a large anterior and septal LV scar extending to the RV septum. A monomorphic VT at 370 msec was induced that had LBBB left inferior axis. We managed to suppress VT inducibility with ablation at the LV and RV septum without affecting cardiac conduction. \par \par He also underwent ICD revision in the right pectoral area because of skin tethering to the device and impending erosion. A new Medtronic single chamber ICD was implanted in a deeper plane. 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 He is currently on apixaban and his arm swelling has subsided. \par \par He is on adequate doses of metoprolol succinate 50 mg daily and lisinopril 20 mg daily with aspirin, apixaban and atorvastatin. \par \par He feels well, has no cardiac symptoms. His right pectoral ICD site is well healed. \par \par Interrogation of the ICD shows no sustained arrhythmias. He has 6 beat run of non sustained VT. He is not pacing in the RV at the set rate of VVI 40ppm. ICD therapy is set for >180 but there is a monitor zone starting at 133 bpm.\par R wave is 11 mV\par Lead impedance is 360 and batter of the ICD has a projected longevity of 11 year. RV pacing threshold is 1.0V/0.4 msec.

## 2020-03-27 NOTE — REASON FOR VISIT
[Follow-Up - Clinic] : a clinic follow-up of [FreeTextEntry1] : Primary cardiologist: Lisandro Christiansen MD\par \par Follow up after ablation for VT\par \par ICD revision by Dr. Nair

## 2020-03-27 NOTE — DISCUSSION/SUMMARY
[FreeTextEntry1] : Mr. Ortez has had a good result from VT ablation and remains free of further VT by ICD interrogation and by symptoms. He functions at NYHA class 1 despite severe ischemic CM and has a good insight into his medical issues and medications. He is seen in the [facility] cardiology clinic here at 3 monthly intervals and I will plan to review him in 6 months to monitor his ventricular arrhythmias and ICD function. \par \par The apixaban could probably be discontinued after 3 months given that the right axillary vein thrombosis was provoked either from manipulation during device revision or immobilization after. He is being followed by vascular medicine.

## 2020-05-27 ENCOUNTER — RX CHANGE (OUTPATIENT)
Age: 65
End: 2020-05-27

## 2020-06-03 ENCOUNTER — NON-APPOINTMENT (OUTPATIENT)
Age: 65
End: 2020-06-03

## 2020-06-03 ENCOUNTER — APPOINTMENT (OUTPATIENT)
Dept: CARDIOLOGY | Facility: HOSPITAL | Age: 65
End: 2020-06-03

## 2020-06-03 ENCOUNTER — APPOINTMENT (OUTPATIENT)
Dept: ELECTROPHYSIOLOGY | Facility: CLINIC | Age: 65
End: 2020-06-03
Payer: MEDICAID

## 2020-06-03 ENCOUNTER — APPOINTMENT (OUTPATIENT)
Dept: CV DIAGNOSITCS | Facility: HOSPITAL | Age: 65
End: 2020-06-03

## 2020-06-03 ENCOUNTER — OUTPATIENT (OUTPATIENT)
Dept: OUTPATIENT SERVICES | Facility: HOSPITAL | Age: 65
LOS: 1 days | End: 2020-06-03
Payer: MEDICAID

## 2020-06-03 VITALS
DIASTOLIC BLOOD PRESSURE: 80 MMHG | WEIGHT: 155 LBS | HEIGHT: 65 IN | HEART RATE: 61 BPM | BODY MASS INDEX: 25.83 KG/M2 | SYSTOLIC BLOOD PRESSURE: 149 MMHG | OXYGEN SATURATION: 99 %

## 2020-06-03 DIAGNOSIS — Z98.89 OTHER SPECIFIED POSTPROCEDURAL STATES: Chronic | ICD-10-CM

## 2020-06-03 DIAGNOSIS — I25.10 ATHEROSCLEROTIC HEART DISEASE OF NATIVE CORONARY ARTERY WITHOUT ANGINA PECTORIS: ICD-10-CM

## 2020-06-03 PROCEDURE — C8929: CPT

## 2020-06-03 PROCEDURE — G0463: CPT

## 2020-06-03 PROCEDURE — 93306 TTE W/DOPPLER COMPLETE: CPT | Mod: 26

## 2020-06-03 PROCEDURE — 93005 ELECTROCARDIOGRAM TRACING: CPT

## 2020-06-03 PROCEDURE — 93282 PRGRMG EVAL IMPLANTABLE DFB: CPT

## 2020-06-03 NOTE — HISTORY OF PRESENT ILLNESS
[FreeTextEntry1] : 64M hx of hx of STEMI 12/2013 s/p BMS to proximal LAD, and ICM HFrEF ~20% s/p ICD and recent gen change 2/2020, VT s/p DCCV and VT abalation 2/2020, provoked RUE DVT on Eliquis, here for RPV.   As before, for VT he had a LHC showing mild-moderate distal disease and subsequently underwent VT ablation 2/19 and ICD gen change on 2/21 w/ pocket revision. He was discharged with no more episodes of VT. His course was complicated at home with his right arm swelling. He went [facility] again where he was evaluated in the ER w/ US which showed extensive deep venous thrombosis right subclavian, axillary and brachial veins. He was started on Eliquis and aspirin stopped. Also was arranged for outpatient f/u with vascular Dr. Calle. \par \par Seen in device clinic.  Had two short < 2 second episodes of NSVT, asx.  Pt is lost his apartment and is currently living in his van.  He is still working and otherwise able to care for himself.  He has means to obtaining his medications and still checks his blood pressure daily.  He has no cardiac symptoms, able to ambulate 1+ flight of stairs without dyspnea, CP, or palpitations.  No other complaints.  \par \par

## 2020-06-03 NOTE — PHYSICAL EXAM
[General Appearance - Well Developed] : well developed [Normal Appearance] : normal appearance [Well Groomed] : well groomed [General Appearance - Well Nourished] : well nourished [No Deformities] : no deformities [General Appearance - In No Acute Distress] : no acute distress [Eyelids - No Xanthelasma] : the eyelids demonstrated no xanthelasmas [Normal Conjunctiva] : the conjunctiva exhibited no abnormalities [Normal Oral Mucosa] : normal oral mucosa [No Oral Cyanosis] : no oral cyanosis [No Oral Pallor] : no oral pallor [Normal Jugular Venous A Waves Present] : normal jugular venous A waves present [No Jugular Venous Morrissey A Waves] : no jugular venous morrissey A waves [Normal Jugular Venous V Waves Present] : normal jugular venous V waves present [] : no respiratory distress [Exaggerated Use Of Accessory Muscles For Inspiration] : no accessory muscle use [Respiration, Rhythm And Depth] : normal respiratory rhythm and effort [Auscultation Breath Sounds / Voice Sounds] : lungs were clear to auscultation bilaterally [Heart Rate And Rhythm] : heart rate and rhythm were normal [Heart Sounds] : normal S1 and S2 [Edema] : no peripheral edema present [Abnormal Walk] : normal gait [FreeTextEntry1] : 2/6 holosystolic murmur

## 2020-06-03 NOTE — ASSESSMENT
[FreeTextEntry1] : 64M hx of hx of STEMI 12/2013 s/p BMS to proximal LAD, and ICM HFrEF ~20% s/p ICD and recent gen change 2/2020, VT s/p VT abalation 2/2020, provoked RUE DVT on Eliquis, here for RPV.\par \par #ICM - stage B\par -EF 20%, interval decrease\par -euvolemic and w/o new cardiac symptoms\par -restarted on Lisinopril 20mg prior clinic visit\par -c/w Toprol XL 50mg daily\par -start spironolactone 25mg for GDMT optimization\par \par #HTN\par -BP ~120-130s/70-80s on home BP Log\par -suspect mild increase in setting of recent stressors and diet changes\par -advised on dietary changes, lifestyle modifications\par -Toprol, Lisinopril as above\par -start spironolactone 25mg daily\par -repeat BMP in 2 weeks\par \par #VT\par -s/p VT ablation\par -~2 sec of NSVT x2 on device interrogation\par -EP follow up in 9/2020\par -c/w Metoprolol 50mg XL\par \par #Right UE DVT\par -provoked in setting of recent ICD revision\par -Eliquis for 6 mo, re-eval in 9/2020\par -vascular follow up\par \par #HLD\par -c/w statin\par \par Will discuss w/ attending\par Lisandro Christiansen MD\par Cardiovascular Fellow

## 2020-06-08 DIAGNOSIS — I82.409 ACUTE EMBOLISM AND THROMBOSIS OF UNSPECIFIED DEEP VEINS OF UNSPECIFIED LOWER EXTREMITY: ICD-10-CM

## 2020-06-08 DIAGNOSIS — I10 ESSENTIAL (PRIMARY) HYPERTENSION: ICD-10-CM

## 2020-06-08 DIAGNOSIS — I42.9 CARDIOMYOPATHY, UNSPECIFIED: ICD-10-CM

## 2020-06-08 DIAGNOSIS — I47.2 VENTRICULAR TACHYCARDIA: ICD-10-CM

## 2020-08-05 NOTE — ED ADULT NURSE NOTE - WOUND TYPE
Physical exam:  Vital signs reviewed as below:  T(C): 36.8 (08-04-20 @ 20:33), Max: 36.8 (08-04-20 @ 20:33)  HR: 76 (08-04-20 @ 22:08) (76 - 90)  BP: 161/85 (08-04-20 @ 22:08) (150/95 - 161/85)  RR: 18 (08-04-20 @ 22:08) (18 - 18)  SpO2: 100% (08-04-20 @ 22:08) (99% - 100%)  Constitutional-awake, alert, not in acute distress  Neuro-awake, alert, appropriately interactive, moving all extremities, 5-/5 strength on bilateral , elbow flexion, and although reports unable to move lower extremities, when she does move her legs has 5-/5 strength on hip flexion, knee extension, dorsiplantarflexion face symmetric  Eyes-pupils equally round and reactive to light, non icteric, no discharge noted  Ears, nose, mouth, throat-no abnormal discharge  CV-regular rate and rhythm, no rubs, murmurs, gallops appreciated, trace lower extremity edema, palpable distal pulses (radial, dorsalis pedis, posterior tibial)  Resp-clear to auscultation bilaterally, normal respiratory effort,   GI-abdomen soft and nontender, slightly distended, no rebound or guarding present  -not examined  MSK-normal range of motion of bilateral elbows and knees, no obvious deformities   Skin-warm, dry, vitiligo on hands/feet/face.    Psych-calm, cooperative, normal affect, not attending to internal stimuli    Rectal exam performed and good rectal tone.
blister

## 2020-08-19 DIAGNOSIS — I21.4 NON-ST ELEVATION (NSTEMI) MYOCARDIAL INFARCTION: ICD-10-CM

## 2020-09-04 ENCOUNTER — APPOINTMENT (OUTPATIENT)
Dept: ELECTROPHYSIOLOGY | Facility: CLINIC | Age: 65
End: 2020-09-04

## 2020-09-09 ENCOUNTER — OUTPATIENT (OUTPATIENT)
Dept: OUTPATIENT SERVICES | Facility: HOSPITAL | Age: 65
LOS: 1 days | End: 2020-09-09
Payer: MEDICAID

## 2020-09-09 ENCOUNTER — APPOINTMENT (OUTPATIENT)
Dept: CARDIOLOGY | Facility: HOSPITAL | Age: 65
End: 2020-09-09

## 2020-09-09 VITALS
DIASTOLIC BLOOD PRESSURE: 81 MMHG | OXYGEN SATURATION: 99 % | SYSTOLIC BLOOD PRESSURE: 135 MMHG | BODY MASS INDEX: 25.83 KG/M2 | HEART RATE: 61 BPM | WEIGHT: 155 LBS | HEIGHT: 65 IN

## 2020-09-09 DIAGNOSIS — Z91.89 OTHER SPECIFIED PERSONAL RISK FACTORS, NOT ELSEWHERE CLASSIFIED: ICD-10-CM

## 2020-09-09 DIAGNOSIS — R31.9 HEMATURIA, UNSPECIFIED: ICD-10-CM

## 2020-09-09 DIAGNOSIS — Z98.89 OTHER SPECIFIED POSTPROCEDURAL STATES: Chronic | ICD-10-CM

## 2020-09-09 DIAGNOSIS — I25.10 ATHEROSCLEROTIC HEART DISEASE OF NATIVE CORONARY ARTERY WITHOUT ANGINA PECTORIS: ICD-10-CM

## 2020-09-09 PROCEDURE — G0463: CPT

## 2020-09-09 PROCEDURE — 93005 ELECTROCARDIOGRAM TRACING: CPT

## 2020-09-09 NOTE — PHYSICAL EXAM
[General Appearance - Well Developed] : well developed [Normal Conjunctiva] : the conjunctiva exhibited no abnormalities [Normal Appearance] : normal appearance [Normal Oral Mucosa] : normal oral mucosa [Heart Rate And Rhythm] : heart rate and rhythm were normal [Respiration, Rhythm And Depth] : normal respiratory rhythm and effort [Heart Sounds] : normal S1 and S2 [Abdomen Soft] : soft [Arterial Pulses Normal] : the arterial pulses were normal [Abnormal Walk] : normal gait [] : no hepato-splenomegaly [Abdomen Tenderness] : non-tender [Nail Clubbing] : no clubbing of the fingernails [Cyanosis, Localized] : no localized cyanosis [Skin Color & Pigmentation] : normal skin color and pigmentation [Oriented To Time, Place, And Person] : oriented to person, place, and time [FreeTextEntry1] : Systolic murmur

## 2020-09-09 NOTE — HISTORY OF PRESENT ILLNESS
[FreeTextEntry1] : 64M hx of hx of STEMI 12/2013 s/p BMS to proximal LAD, and ICM HFrEF ~20% s/p ICD and recent gen change 2/2020, VT s/p DCCV and VT abalation 2/2020, provoked RUE DVT on Eliquis, here for f/u. Patient was started on spironolactone 25mg qd last visit. Reports 100% compliance with all meds. Denies presyncope/syncope. Patient taking eliquis as prescribed; denies melena, hematochezia, BRBPR. No gi hematuria, but reports possibly noticing 3 drops of blood in urine one week ago. Denies dysuria. \par Patient otherwise well w/ no cardiac complaints. Denies CP, dyspnea, HA, presyncope/syncope, LE edema, palpitations, ICD shocks, UE pain/swelling. Of note, patient missed his EP visit onn 9/4 due to forgetfulness. \par \par Pt is currently living in his van. He is still working and otherwise able to care for himself. He has means to obtaining his medications and still checks his blood pressure daily.

## 2020-09-10 DIAGNOSIS — I82.409 ACUTE EMBOLISM AND THROMBOSIS OF UNSPECIFIED DEEP VEINS OF UNSPECIFIED LOWER EXTREMITY: ICD-10-CM

## 2020-09-10 DIAGNOSIS — I42.9 CARDIOMYOPATHY, UNSPECIFIED: ICD-10-CM

## 2020-09-10 DIAGNOSIS — I47.2 VENTRICULAR TACHYCARDIA: ICD-10-CM

## 2020-10-08 ENCOUNTER — APPOINTMENT (OUTPATIENT)
Dept: ELECTROPHYSIOLOGY | Facility: CLINIC | Age: 65
End: 2020-10-08
Payer: MEDICAID

## 2020-10-08 ENCOUNTER — NON-APPOINTMENT (OUTPATIENT)
Age: 65
End: 2020-10-08

## 2020-10-08 VITALS — SYSTOLIC BLOOD PRESSURE: 122 MMHG | OXYGEN SATURATION: 99 % | HEART RATE: 83 BPM | DIASTOLIC BLOOD PRESSURE: 74 MMHG

## 2020-10-08 PROCEDURE — 99214 OFFICE O/P EST MOD 30 MIN: CPT

## 2020-10-08 PROCEDURE — 93000 ELECTROCARDIOGRAM COMPLETE: CPT

## 2020-10-11 ENCOUNTER — EMERGENCY (EMERGENCY)
Facility: HOSPITAL | Age: 65
LOS: 1 days | Discharge: HOME | End: 2020-10-11
Attending: EMERGENCY MEDICINE | Admitting: EMERGENCY MEDICINE
Payer: MEDICARE

## 2020-10-11 VITALS
SYSTOLIC BLOOD PRESSURE: 108 MMHG | RESPIRATION RATE: 18 BRPM | HEIGHT: 65 IN | HEART RATE: 68 BPM | TEMPERATURE: 97 F | WEIGHT: 149.91 LBS | OXYGEN SATURATION: 94 % | DIASTOLIC BLOOD PRESSURE: 67 MMHG

## 2020-10-11 VITALS
HEART RATE: 69 BPM | TEMPERATURE: 98 F | DIASTOLIC BLOOD PRESSURE: 65 MMHG | OXYGEN SATURATION: 98 % | SYSTOLIC BLOOD PRESSURE: 110 MMHG | RESPIRATION RATE: 17 BRPM

## 2020-10-11 DIAGNOSIS — Z98.89 OTHER SPECIFIED POSTPROCEDURAL STATES: Chronic | ICD-10-CM

## 2020-10-11 PROCEDURE — 99282 EMERGENCY DEPT VISIT SF MDM: CPT

## 2020-10-11 NOTE — ED PROVIDER NOTE - ATTENDING CONTRIBUTION TO CARE
Pt is a 64 yo male who presents to the ED with a cc of possible reaction to chemical exposure.  PMHx of HTN (hypertension), Hyperlipidemia, MI (myocardial infarction), Stented coronary artery.  Pt reports that yesterday while at work he was wearing the mask he believed that he was suppose to wear but he believes that the attacked filters may not have been correct.  He reports that he was dumping a gallon of muriatic acid into 4 gallons of water to dilute the mixture.  He then would  place this mixture into the pool.  He reports that while mixing the contents he may have breathed in some of the fumes. Denies ingesting the chemical, denies skin exposure.  He reports that initially he has no symptoms. Later that evening he noted a slight irritation to his chest and throat.  Denies noting cough or SOB.  Today the irritation to his chest has resolved but he did note some redness to his ulva with mild throat irritation improved with drinking water.  Denies difficulty swallowing, denies SOB.  Denies fever, chills, N/V, CP, SOB, abd pain.  On exam pt sitting up in bed NAD, NCAT, throat mild redness noted to ulva no tonsilar swelling, no exudate noted.  Heart RR, lungs CTA, abd soft NT/ND.  Pt with mild throat irritation s/p possible fume exposure day prior, non toxic appearing. Will contact poison control and provide symptoms management

## 2020-10-11 NOTE — ED ADULT NURSE NOTE - OBJECTIVE STATEMENT
Presents to ER with throat irritation secondary to inhaling pool chemicals early this morning.  Denies any CP/SOB.

## 2020-10-11 NOTE — ED PROVIDER NOTE - PATIENT PORTAL LINK FT
You can access the FollowMyHealth Patient Portal offered by [facility] by registering at the following website: [email or web address]. By joining [facility]’s FollowMyHealth portal, you will also be able to view your health information using other applications (apps) compatible with our system.

## 2020-10-11 NOTE — ED PROVIDER NOTE - CARE PROVIDER_API CALL
Kellie Bear S  INTERNAL MEDICINE  [address] [place] [id number]  Phone: [phone]  Fax: [phone]  Follow Up Time: 1-3 Days

## 2020-10-11 NOTE — ED PROVIDER NOTE - PROGRESS NOTE DETAILS
spoke with poison control. no cough, no shortness of breath, no stridor. recommends supportive care, no intervention needed at this time spoke with poison control. no cough, no shortness of breath, no stridor. recommends supportive care, no intervention needed at this time. return precautions explained

## 2020-10-11 NOTE — ED PROVIDER NOTE - NSFOLLOWUPINSTRUCTIONS_ED_ALL_ED_FT
drink plenty of fluids   return for any shortness of breath, unable to swallow, or stridor      Chemical Inhalation Injury, Adult    A chemical inhalation injury happens when a person breathes in (inhales) fumes or particles from chemicals that damage the throat, airways, or lungs (respiratory tract). Chemical inhalation injuries most often occur:  •During fires, when materials that are burned release chemicals into the environment.      •During work accidents, when large amounts of toxic chemicals are spilled at factories or industrial sites.    Chemicals that may be harmful are also found in many common household  and other products, such as:  •Aerosol sprays.      •Oven .      •Air fresheners.      •Furniture polish.      Mixing ammonia (or a product that contains ammonia) with bleach (or a product that contains bleach) is another common cause of household chemical inhalation injuries. This mixture produces a harmful gas (chloramine) that can irritate the lungs.    Chemical inhalation injuries can range from mild irritation of the upper airway to a serious injury deep in the lungs. This can affect your breathing ability. The level of injury depends on what chemical was involved, how strong it is, and how long you were exposed to it.      What are the causes?    Chemical inhalation injuries are caused by inhaling fumes or particles from chemicals. This can happen at work, at home, or during a fire.      What increases the risk?  This injury is more likely to happen to people who:  •Are exposed to burning materials.      •Work with chemicals, solvents, or .        What are the signs or symptoms?  Symptoms of this injury may include:  •Burning or irritation of the eyes, nose, throat, and windpipe (trachea).      •Coughing.      •Shortness of breath.      •Wheezing.      •Trouble breathing. This is caused by airway swelling.      •Coughing up blood.      •Headache.      •Dizziness.      •Nausea or vomiting.      •Weakness or fatigue.      Depending on the type of chemical exposure, you may have symptoms right away or up to 12 hours later.      How is this diagnosed?  This injury is diagnosed based on your symptoms, your medical history, and a physical exam. You may also have tests, including:  •Breathing tests to measure how much air your lungs can hold.      •Tests to measure the level of oxygen in your blood (pulse oximetry).    •Imaging studies, such as:  •Chest X-rays. These check for fluid buildup or inflammation of the small airways of the lungs (bronchioles).      •Chest CT scan. This test can identify injuries that are not visible by X-ray.          How is this treated?  Oxygen therapy is the main treatment for this injury. You may be given extra oxygen through a mask or a nose tube. Severe injuries may require use of a machine (ventilator) to help you breathe. Treatment may also include:  •Medicine to open the airways and reduce inflammation (bronchodilators or corticosteroids).      •IV fluids.      •Antibiotic medicines to prevent or treat lung infection caused by bacteria.      Depending on the injury, you may need to stay in the hospital to be monitored closely.      Follow these instructions at home:    •Take over-the-counter and prescription medicines only as told by your health care provider.      •If you were prescribed an antibiotic medicine, take it as told by your health care provider. Do not stop taking the antibiotic even if you start to feel better.      •Return to your normal activities as told by your health care provider. Ask your health care provider what activities are safe for you.      • Do not use any products that contain nicotine or tobacco, such as cigarettes and e-cigarettes. If you need help quitting, ask your health care provider.      •Keep all follow-up visits as told by your health care provider. This is important.        How is this prevented?  To prevent this type of injury:  •Do not mix bleach or any bleach-containing product with any  that contains ammonia.      •Do not put potentially harmful chemicals in spray bottles.      •Read all labels and follow instructions for using cleaning products.      •Use cleaning products only in well-ventilated areas.      •Follow work safety practices closely if your job puts you at risk for chemical inhalation. Be very careful to limit your exposure to gases and chemicals.        Contact a health care provider if:    •You have a fever.      •Your symptoms do not improve or they get worse.        Get help right away if:    •You have trouble breathing.      •You are coughing up blood.        Summary    •A chemical inhalation injury happens when a person breathes in (inhales) fumes or particles from chemicals that damage the throat, airways, or lungs (respiratory tract).      •Chemical inhalation injuries occur most often during fires or work accidents. Chemicals that may be harmful are also found in many common household  and other products.      •Oxygen therapy is the main treatment for this injury. You may be given extra oxygen through a mask or a nose tube.      •If your job puts you at risk for chemical inhalation, follow work safety practices closely. Be very careful to limit your exposure to gases and chemicals.      This information is not intended to replace advice given to you by your health care provider. Make sure you discuss any questions you have with your health care provider.

## 2020-10-11 NOTE — ED PROVIDER NOTE - OBJECTIVE STATEMENT
65 year old male with history of CAD, HTN, and HLD presents with throat irritation today after breathing in fumes of muriatic acid yesterday morning. patient reports wearing a mask (believes it was a n95 mask but thinks it was the wrong one). was dumping a gallon of muriatric acid into 4 gallons of water to dump into pool. no ingestion of acid or spills. no symptoms initially. later that evening noticed slight irritation to chest. no cough or shortness of breath. reports irritation to chest has now resolved. this morning he noticed some redness to uvula and slight irritation to throat. symptoms overall improved now compared to this am. no pain, "more of a slight irritation". no problems swallowing, eating, drinking, or handling secretions.   PCP Kellie Bear

## 2020-10-11 NOTE — ED PROVIDER NOTE - ENMT, MLM
Airway patent, Throat has no vesicles, no oropharyngeal exudates and uvula is midline. slight irritation to uvula. no facial or lip swelling. handles secretions without difficulty. drinking without difficulty

## 2020-10-11 NOTE — ED PROVIDER NOTE - CLINICAL SUMMARY MEDICAL DECISION MAKING FREE TEXT BOX
throat irritation after inhaling muriatic acid yesterday morning at 8:30. no diff breathing or swallowing. no cough or stridor. taking po fluids. suspect mild chemical irritation. will consult poison control, encourage drinking plenty of fluids

## 2020-10-13 ENCOUNTER — OUTPATIENT (OUTPATIENT)
Dept: OUTPATIENT SERVICES | Facility: HOSPITAL | Age: 65
LOS: 1 days | End: 2020-10-13
Payer: MEDICARE

## 2020-10-13 DIAGNOSIS — Z98.89 OTHER SPECIFIED POSTPROCEDURAL STATES: Chronic | ICD-10-CM

## 2020-10-13 DIAGNOSIS — I82.409 ACUTE EMBOLISM AND THROMBOSIS OF UNSPECIFIED DEEP VEINS OF UNSPECIFIED LOWER EXTREMITY: ICD-10-CM

## 2020-10-13 PROCEDURE — 93971 EXTREMITY STUDY: CPT

## 2020-10-13 PROCEDURE — 93971 EXTREMITY STUDY: CPT | Mod: 26,RT

## 2020-10-15 NOTE — PHYSICAL EXAM
[General Appearance - Well Developed] : well developed [Normal Appearance] : normal appearance [Well Groomed] : well groomed [General Appearance - Well Nourished] : well nourished [No Deformities] : no deformities [General Appearance - In No Acute Distress] : no acute distress [Normal Conjunctiva] : the conjunctiva exhibited no abnormalities [Eyelids - No Xanthelasma] : the eyelids demonstrated no xanthelasmas [Normal Oral Mucosa] : normal oral mucosa [No Oral Pallor] : no oral pallor [No Oral Cyanosis] : no oral cyanosis [Normal Jugular Venous A Waves Present] : normal jugular venous A waves present [Normal Jugular Venous V Waves Present] : normal jugular venous V waves present [No Jugular Venous Morrissey A Waves] : no jugular venous morrissey A waves [Respiration, Rhythm And Depth] : normal respiratory rhythm and effort [Exaggerated Use Of Accessory Muscles For Inspiration] : no accessory muscle use [Auscultation Breath Sounds / Voice Sounds] : lungs were clear to auscultation bilaterally [Heart Rate And Rhythm] : heart rate and rhythm were normal [Heart Sounds] : normal S1 and S2 [Murmurs] : no murmurs present [Abdomen Soft] : soft [Abdomen Tenderness] : non-tender [Abdomen Mass (___ Cm)] : no abdominal mass palpated [Abnormal Walk] : normal gait [Gait - Sufficient For Exercise Testing] : the gait was sufficient for exercise testing [Skin Color & Pigmentation] : normal skin color and pigmentation [] : no rash [No Venous Stasis] : no venous stasis [Skin Lesions] : no skin lesions [No Skin Ulcers] : no skin ulcer [No Xanthoma] : no  xanthoma was observed [FreeTextEntry1] : ICD site well healed. JVP normal, no edema

## 2020-10-15 NOTE — DISCUSSION/SUMMARY
[FreeTextEntry1] : Mr. Ortez has had a good result from VT ablation and remains free of further VT by ICD interrogation and by symptoms. He will follow in the general cardiology clinic and with the ICD clinic in 6 months. If he has recurrence of VT, he will need repeat ablation. \par \par I will plan to see him as and when necessary.

## 2020-10-15 NOTE — HISTORY OF PRESENT ILLNESS
[FreeTextEntry1] : THis 64 year old man with a prior anterior septal MI and ischemic CM with LVEF 25% but NYHA 1 functional class presented with ventricular tachycardia below the rate cut off of the Medtronic single chamber ICD in February 2020. We took him to the EP lab and found a large anterior and septal LV scar extending to the RV septum. A monomorphic VT at 370 msec was induced that had LBBB left inferior axis. We managed to suppress VT inducibility with ablation at the LV and RV septum without affecting cardiac conduction. \par \par He also underwent ICD revision in the right pectoral area because of skin tethering to the device and impending erosion. A new Medtronic single chamber ICD was implanted in a deeper plane. 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 He is currently on apixaban and his arm swelling has subsided. \par \par He is on adequate doses of metoprolol succinate 50 mg daily and lisinopril 20 mg daily with aspirin, n and atorvastatin. \par \par He feels well, has no cardiac symptoms. His right pectoral ICD site is well healed. \par \par Interrogation of the ICD shows no sustained arrhythmias. He has 3-6 beat runs of non sustained VT. He is not pacing in the RV at the set rate of VVI 40ppm. ICD therapy is set for >180 but there is a monitor zone starting at 133 bpm.\par R wave is 12 mV, RV pacing threshold is 1.0V/0.4 msec, Lead impedance is 361 Ohms and 66 Ohms for pacing and defibrillation respectively. \par \par \par Lead impedance is 360 and batter of the ICD has a projected longevity of 11 year. RV pacing threshold is 1.0V/0.4 msec.

## 2020-10-15 NOTE — REASON FOR VISIT
[Follow-Up - Clinic] : a clinic follow-up of [FreeTextEntry1] : Primary cardiologist: Lisandro Christiansen MD/ Enoc Mcfarland MD\par \par Follow up after ablation for VT\par \par ICD revision by Dr. Nair

## 2020-10-28 ENCOUNTER — OUTPATIENT (OUTPATIENT)
Dept: OUTPATIENT SERVICES | Facility: HOSPITAL | Age: 65
LOS: 1 days | End: 2020-10-28
Payer: MEDICARE

## 2020-10-28 ENCOUNTER — APPOINTMENT (OUTPATIENT)
Dept: CARDIOLOGY | Facility: HOSPITAL | Age: 65
End: 2020-10-28

## 2020-10-28 VITALS
WEIGHT: 155 LBS | HEART RATE: 73 BPM | SYSTOLIC BLOOD PRESSURE: 115 MMHG | HEIGHT: 65 IN | BODY MASS INDEX: 25.83 KG/M2 | DIASTOLIC BLOOD PRESSURE: 68 MMHG | OXYGEN SATURATION: 98 %

## 2020-10-28 DIAGNOSIS — Z98.89 OTHER SPECIFIED POSTPROCEDURAL STATES: Chronic | ICD-10-CM

## 2020-10-28 DIAGNOSIS — I82.409 ACUTE EMBOLISM AND THROMBOSIS OF UNSPECIFIED DEEP VEINS OF UNSPECIFIED LOWER EXTREMITY: ICD-10-CM

## 2020-10-28 DIAGNOSIS — I25.10 ATHEROSCLEROTIC HEART DISEASE OF NATIVE CORONARY ARTERY WITHOUT ANGINA PECTORIS: ICD-10-CM

## 2020-10-28 PROCEDURE — G0463: CPT

## 2020-10-28 RX ORDER — APIXABAN 5 MG/1
5 TABLET, FILM COATED ORAL
Qty: 90 | Refills: 3 | Status: DISCONTINUED | COMMUNITY
Start: 2020-03-10 | End: 2020-10-28

## 2020-10-28 NOTE — ASSESSMENT
[FreeTextEntry1] : 65M hx of hx of STEMI 12/2013 s/p BMS to proximal LAD, and ICM HFrEF ~20% s/p ICD and recent gen change 2/2020, VT s/p VT abalation 2/2020, provoked RUE DVT on Eliquis, here for RPV.\par \par #ICM - stage B\par -EF 20%, interval decrease\par -switch to ASA 81mg daily from Plavix as no longer taking Eliquis\par -euvolemic and w/o new cardiac symptoms\par -c/w Lisinopril 20mg daily, spironolactone 25mg daily\par -c/w Toprol XL 50mg daily\par -will discuss further addition of Farxiga if financially feasible\par \par #HTN\par -BP 110s/70s\par -Toprol, Lisinopril, spironolactone\par \par #VT\par -s/p VT ablation\par -short NSVT per recent interrogation\par -Toprol as above\par -c/w ongoing EP follow up \par \par #Right UE DVT\par -provoked in setting of recent ICD revision\par -will d/c Eliquis now s/p 6 mo therapy\par \par #HLD\par -c/w statin\par \par #pleurisy\par -in setting of inhaling hydrochloric acid\par -advised on pulmonary follow up\par \par Discussed w/ Dr. Tamayo\par Lisandro Christiansen MD

## 2020-10-28 NOTE — HISTORY OF PRESENT ILLNESS
[FreeTextEntry1] : 65M hx of hx of STEMI 12/2013 s/p BMS to proximal LAD, and ICM HFrEF ~20% s/p ICD and recent gen change 2/2020, VT s/p DCCV and VT ablation 2/2020, provoked RUE DVT on Eliquis, here for f/u.  Pt states since his last visit with Dr. Mcfarland on 9/9/20 he has had a workplace incidence where he may have inhaled 37.5% hydrochloric acid as he was given the wrong respiratory to wear.  Since then, he states he has been having some mild pleurisy that has been improving.  Has not been evaluated by pulmonology yet.   He still currently lives in his van however is able to care for himself. Otherwise, he has no other complaints and feels well.  Denies CP, dyspnea, HA, presyncope/syncope, LE edema, palpitations, ICD shocks, UE pain/swelling.

## 2020-10-28 NOTE — PHYSICAL EXAM
[General Appearance - Well Developed] : well developed [Normal Appearance] : normal appearance [Well Groomed] : well groomed [General Appearance - Well Nourished] : well nourished [No Deformities] : no deformities [General Appearance - In No Acute Distress] : no acute distress [Normal Conjunctiva] : the conjunctiva exhibited no abnormalities [Eyelids - No Xanthelasma] : the eyelids demonstrated no xanthelasmas [Normal Oral Mucosa] : normal oral mucosa [No Oral Pallor] : no oral pallor [No Oral Cyanosis] : no oral cyanosis [Respiration, Rhythm And Depth] : normal respiratory rhythm and effort [Exaggerated Use Of Accessory Muscles For Inspiration] : no accessory muscle use [Auscultation Breath Sounds / Voice Sounds] : lungs were clear to auscultation bilaterally [Heart Rate And Rhythm] : heart rate and rhythm were normal [Heart Sounds] : normal S1 and S2 [Murmurs] : no murmurs present [Abdomen Soft] : soft [Abdomen Tenderness] : non-tender [Abdomen Mass (___ Cm)] : no abdominal mass palpated [Abnormal Walk] : normal gait [Gait - Sufficient For Exercise Testing] : the gait was sufficient for exercise testing [Nail Clubbing] : no clubbing of the fingernails [Cyanosis, Localized] : no localized cyanosis [Petechial Hemorrhages (___cm)] : no petechial hemorrhages [] : no ischemic changes

## 2020-10-30 DIAGNOSIS — I42.9 CARDIOMYOPATHY, UNSPECIFIED: ICD-10-CM

## 2020-10-30 DIAGNOSIS — I82.409 ACUTE EMBOLISM AND THROMBOSIS OF UNSPECIFIED DEEP VEINS OF UNSPECIFIED LOWER EXTREMITY: ICD-10-CM

## 2020-10-30 DIAGNOSIS — I47.2 VENTRICULAR TACHYCARDIA: ICD-10-CM

## 2021-01-01 NOTE — ED ADULT NURSE NOTE - NSFALLRSKOUTCOME_ED_ALL_ED
Universal Safety Interventions Admit to NICU  Vitals as per protocol  Observe in NICU for 6 hours minimum due to extramural delivery  PUI status until parents cleared  Breast feeding ad kelsey po q 3hourly as tolerated

## 2021-01-27 ENCOUNTER — INPATIENT (INPATIENT)
Facility: HOSPITAL | Age: 66
LOS: 0 days | Discharge: HOME | DRG: 392 | End: 2021-01-28
Attending: FAMILY MEDICINE | Admitting: INTERNAL MEDICINE
Payer: COMMERCIAL

## 2021-01-27 VITALS
RESPIRATION RATE: 15 BRPM | HEART RATE: 74 BPM | SYSTOLIC BLOOD PRESSURE: 133 MMHG | HEIGHT: 65 IN | TEMPERATURE: 98 F | DIASTOLIC BLOOD PRESSURE: 83 MMHG | WEIGHT: 160.06 LBS | OXYGEN SATURATION: 97 %

## 2021-01-27 DIAGNOSIS — I25.10 ATHEROSCLEROTIC HEART DISEASE OF NATIVE CORONARY ARTERY WITHOUT ANGINA PECTORIS: ICD-10-CM

## 2021-01-27 DIAGNOSIS — E78.5 HYPERLIPIDEMIA, UNSPECIFIED: ICD-10-CM

## 2021-01-27 DIAGNOSIS — R07.9 CHEST PAIN, UNSPECIFIED: ICD-10-CM

## 2021-01-27 DIAGNOSIS — Z29.9 ENCOUNTER FOR PROPHYLACTIC MEASURES, UNSPECIFIED: ICD-10-CM

## 2021-01-27 DIAGNOSIS — R10.13 EPIGASTRIC PAIN: ICD-10-CM

## 2021-01-27 DIAGNOSIS — I50.9 HEART FAILURE, UNSPECIFIED: ICD-10-CM

## 2021-01-27 DIAGNOSIS — I10 ESSENTIAL (PRIMARY) HYPERTENSION: ICD-10-CM

## 2021-01-27 DIAGNOSIS — Z98.89 OTHER SPECIFIED POSTPROCEDURAL STATES: Chronic | ICD-10-CM

## 2021-01-27 DIAGNOSIS — I47.2 VENTRICULAR TACHYCARDIA: ICD-10-CM

## 2021-01-27 LAB
ALBUMIN SERPL ELPH-MCNC: 3.4 G/DL — SIGNIFICANT CHANGE UP (ref 3.3–5)
ALP SERPL-CCNC: 57 U/L — SIGNIFICANT CHANGE UP (ref 40–120)
ALT FLD-CCNC: 23 U/L — SIGNIFICANT CHANGE UP (ref 12–78)
ANION GAP SERPL CALC-SCNC: 8 MMOL/L — SIGNIFICANT CHANGE UP (ref 5–17)
AST SERPL-CCNC: 20 U/L — SIGNIFICANT CHANGE UP (ref 15–37)
BASOPHILS # BLD AUTO: 0.02 K/UL — SIGNIFICANT CHANGE UP (ref 0–0.2)
BASOPHILS NFR BLD AUTO: 0.3 % — SIGNIFICANT CHANGE UP (ref 0–2)
BILIRUB SERPL-MCNC: 0.9 MG/DL — SIGNIFICANT CHANGE UP (ref 0.2–1.2)
BUN SERPL-MCNC: 28 MG/DL — HIGH (ref 7–23)
CALCIUM SERPL-MCNC: 8.4 MG/DL — LOW (ref 8.5–10.1)
CHLORIDE SERPL-SCNC: 112 MMOL/L — HIGH (ref 96–108)
CO2 SERPL-SCNC: 25 MMOL/L — SIGNIFICANT CHANGE UP (ref 22–31)
CREAT SERPL-MCNC: 0.85 MG/DL — SIGNIFICANT CHANGE UP (ref 0.5–1.3)
EOSINOPHIL # BLD AUTO: 0.09 K/UL — SIGNIFICANT CHANGE UP (ref 0–0.5)
EOSINOPHIL NFR BLD AUTO: 1.2 % — SIGNIFICANT CHANGE UP (ref 0–6)
GLUCOSE SERPL-MCNC: 79 MG/DL — SIGNIFICANT CHANGE UP (ref 70–99)
HCT VFR BLD CALC: 42.1 % — SIGNIFICANT CHANGE UP (ref 39–50)
HGB BLD-MCNC: 14.3 G/DL — SIGNIFICANT CHANGE UP (ref 13–17)
IMM GRANULOCYTES NFR BLD AUTO: 0.4 % — SIGNIFICANT CHANGE UP (ref 0–1.5)
LIDOCAIN IGE QN: 102 U/L — SIGNIFICANT CHANGE UP (ref 73–393)
LYMPHOCYTES # BLD AUTO: 0.96 K/UL — LOW (ref 1–3.3)
LYMPHOCYTES # BLD AUTO: 12.7 % — LOW (ref 13–44)
MAGNESIUM SERPL-MCNC: 2.3 MG/DL — SIGNIFICANT CHANGE UP (ref 1.6–2.6)
MCHC RBC-ENTMCNC: 30.2 PG — SIGNIFICANT CHANGE UP (ref 27–34)
MCHC RBC-ENTMCNC: 34 GM/DL — SIGNIFICANT CHANGE UP (ref 32–36)
MCV RBC AUTO: 89 FL — SIGNIFICANT CHANGE UP (ref 80–100)
MONOCYTES # BLD AUTO: 0.56 K/UL — SIGNIFICANT CHANGE UP (ref 0–0.9)
MONOCYTES NFR BLD AUTO: 7.4 % — SIGNIFICANT CHANGE UP (ref 2–14)
NEUTROPHILS # BLD AUTO: 5.91 K/UL — SIGNIFICANT CHANGE UP (ref 1.8–7.4)
NEUTROPHILS NFR BLD AUTO: 78 % — HIGH (ref 43–77)
NRBC # BLD: 0 /100 WBCS — SIGNIFICANT CHANGE UP (ref 0–0)
PLATELET # BLD AUTO: 131 K/UL — LOW (ref 150–400)
POTASSIUM SERPL-MCNC: 4.1 MMOL/L — SIGNIFICANT CHANGE UP (ref 3.5–5.3)
POTASSIUM SERPL-SCNC: 4.1 MMOL/L — SIGNIFICANT CHANGE UP (ref 3.5–5.3)
PROT SERPL-MCNC: 6.5 G/DL — SIGNIFICANT CHANGE UP (ref 6–8.3)
RBC # BLD: 4.73 M/UL — SIGNIFICANT CHANGE UP (ref 4.2–5.8)
RBC # FLD: 13.5 % — SIGNIFICANT CHANGE UP (ref 10.3–14.5)
SARS-COV-2 RNA SPEC QL NAA+PROBE: SIGNIFICANT CHANGE UP
SODIUM SERPL-SCNC: 145 MMOL/L — SIGNIFICANT CHANGE UP (ref 135–145)
TROPONIN I SERPL-MCNC: <.015 NG/ML — SIGNIFICANT CHANGE UP (ref 0.01–0.04)
WBC # BLD: 7.57 K/UL — SIGNIFICANT CHANGE UP (ref 3.8–10.5)
WBC # FLD AUTO: 7.57 K/UL — SIGNIFICANT CHANGE UP (ref 3.8–10.5)

## 2021-01-27 PROCEDURE — 71045 X-RAY EXAM CHEST 1 VIEW: CPT | Mod: 26

## 2021-01-27 PROCEDURE — 99285 EMERGENCY DEPT VISIT HI MDM: CPT

## 2021-01-27 PROCEDURE — 99223 1ST HOSP IP/OBS HIGH 75: CPT | Mod: GC

## 2021-01-27 PROCEDURE — 93010 ELECTROCARDIOGRAM REPORT: CPT

## 2021-01-27 PROCEDURE — 99223 1ST HOSP IP/OBS HIGH 75: CPT

## 2021-01-27 RX ORDER — PANTOPRAZOLE SODIUM 20 MG/1
40 TABLET, DELAYED RELEASE ORAL
Refills: 0 | Status: DISCONTINUED | OUTPATIENT
Start: 2021-01-27 | End: 2021-01-28

## 2021-01-27 RX ORDER — LISINOPRIL 2.5 MG/1
20 TABLET ORAL DAILY
Refills: 0 | Status: DISCONTINUED | OUTPATIENT
Start: 2021-01-27 | End: 2021-01-28

## 2021-01-27 RX ORDER — ASPIRIN/CALCIUM CARB/MAGNESIUM 324 MG
325 TABLET ORAL ONCE
Refills: 0 | Status: COMPLETED | OUTPATIENT
Start: 2021-01-27 | End: 2021-01-27

## 2021-01-27 RX ORDER — PANTOPRAZOLE SODIUM 20 MG/1
40 TABLET, DELAYED RELEASE ORAL ONCE
Refills: 0 | Status: COMPLETED | OUTPATIENT
Start: 2021-01-27 | End: 2021-01-27

## 2021-01-27 RX ORDER — ATORVASTATIN CALCIUM 80 MG/1
80 TABLET, FILM COATED ORAL AT BEDTIME
Refills: 0 | Status: DISCONTINUED | OUTPATIENT
Start: 2021-01-27 | End: 2021-01-28

## 2021-01-27 RX ORDER — METOPROLOL TARTRATE 50 MG
50 TABLET ORAL DAILY
Refills: 0 | Status: DISCONTINUED | OUTPATIENT
Start: 2021-01-27 | End: 2021-01-28

## 2021-01-27 RX ORDER — ASPIRIN/CALCIUM CARB/MAGNESIUM 324 MG
81 TABLET ORAL DAILY
Refills: 0 | Status: DISCONTINUED | OUTPATIENT
Start: 2021-01-27 | End: 2021-01-28

## 2021-01-27 RX ORDER — ENOXAPARIN SODIUM 100 MG/ML
40 INJECTION SUBCUTANEOUS DAILY
Refills: 0 | Status: DISCONTINUED | OUTPATIENT
Start: 2021-01-27 | End: 2021-01-28

## 2021-01-27 RX ADMIN — ATORVASTATIN CALCIUM 80 MILLIGRAM(S): 80 TABLET, FILM COATED ORAL at 22:53

## 2021-01-27 RX ADMIN — Medication 325 MILLIGRAM(S): at 19:31

## 2021-01-27 RX ADMIN — PANTOPRAZOLE SODIUM 40 MILLIGRAM(S): 20 TABLET, DELAYED RELEASE ORAL at 19:31

## 2021-01-27 NOTE — CONSULT NOTE ADULT - SUBJECTIVE AND OBJECTIVE BOX
[facility] - Андрей Cardenas, José Miguel, Joshua, Magnus, Nubia Mcrae  Office Number: [phone]    Initial Consult Note    CHIEF COMPLAINT: Patient is a 65y old  Male who presents with a chief complaint of abdominal pain     HPI:  65 yearmale with PMHx of MI (2013) w/ DESx1, AICD, HTN, HLD,  VTs /p VT ablation 2/19/2020 and  s/p medronic ICD pulse generator change with pocket revision 2/21/2020 presenting with abdominal pain. Reports two week history of epigastric pain. He recently saw his PMD who prescribed omeprazole. Today pain worsened so he went to urgent care and was sent to ed. Seen at bedside, denies chest pain dizziness palpitations or shortness of breath. denies recent illness, no fever chills n/v/d/      PAST MEDICAL & SURGICAL HISTORY:  Hyperlipidemia    HTN (hypertension)    Stented coronary artery    MI (myocardial infarction)    History of tonsillectomy        SOCIAL HISTORY:  No tobacco, ethanol, or drug abuse.    FAMILY HISTORY:  No pertinent family history in first degree relatives      No family history of acute MI or sudden cardiac death.    MEDICATIONS  (STANDING):  aspirin 325 milliGRAM(s) Oral Once  pantoprazole  Injectable 40 milliGRAM(s) IV Push Once    MEDICATIONS  (PRN):      Allergies    No Known Allergies    Intolerances        REVIEW OF SYSTEMS:    CONSTITUTIONAL: No weakness, fevers or chills  EYES/ENT: No visual changes;  No vertigo or throat pain   NECK: No pain or stiffness  RESPIRATORY: No cough, wheezing, hemoptysis; No shortness of breath  CARDIOVASCULAR: No chest pain or palpitations  GASTROINTESTINAL: + abdominal pain. No nausea, vomiting, or hematemesis; No diarrhea or constipation. No melena or hematochezia.  GENITOURINARY: No dysuria, frequency or hematuria  NEUROLOGICAL: No numbness or weakness  SKIN: No itching or rash  All other review of systems is negative unless indicated above    VITAL SIGNS:   Vital Signs Last 24 Hrs  T(C): 36.6 (27 Jan 2021 15:42), Max: 36.6 (27 Jan 2021 15:42)  T(F): 97.9 (27 Jan 2021 15:42), Max: 97.9 (27 Jan 2021 15:42)  HR: 74 (27 Jan 2021 15:42) (74 - 74)  BP: 133/83 (27 Jan 2021 15:42) (133/83 - 133/83)  BP(mean): --  RR: 15 (27 Jan 2021 15:42) (15 - 15)  SpO2: 97% (27 Jan 2021 15:42) (97% - 97%)    I&O's Summary      On Exam:    Constitutional: NAD, alert and oriented x 3  Lungs:  Non-labored, breath sounds are clear bilaterally, No wheezing, rales or rhonchi  Cardiovascular: RRR.  S1 and S2 positive. + murmur   Gastrointestinal: Bowel Sounds present, soft,tender to touch    Lymph: No peripheral edema. No cervical lymphadenopathy.  Neurological: Alert, no focal deficits  Skin: No rashes or ulcers   Psych:  Mood & affect appropriate.    LABS: All Labs Reviewed:                Blood Culture:         RADIOLOGY:    EKG:  < from: TTE with Doppler (w/Cont) (06.03.20 @ 10:24) >  Conclusions:  1. Mitral annular calcification, otherwise normal mitral  valve. Moderate mitral regurgitation.  2. Normal trileaflet aortic valve. Mild aortic  regurgitation.  3. Severe segmental left ventricular systolic dysfunction.  Endocardial visualization enhanced with intravenous  injection of Ultrasonic Enhancing Agent (Definity). No left  ventricular thrombus.  There is swirling of Ultrasonic  Enhancing Agent (Definity) in the aprex indicating a low  flow state.  4. Moderate diastolic dysfunction (Stage II).  5. Mild right ventricular enlargement with normal right  ventricular systolic function. A device wire is noted in  the right heart.  6. Normal pericardium with trace pericardial effusion.  *** Compared with echocardiogram of 2/17/2020, no  significant changes noted.  ----------------------------------  < from: 12 Lead ECG (03.04.20 @ 12:08) >    Ventricular Rate 68 BPM    Atrial Rate 68 BPM    P-R Interval 162 ms    QRS Duration 120 ms    Q-T Interval 420 ms    QTC Calculation(Bezet) 446 ms    P Axis 74 degrees    R Axis -67 degrees    T Axis 120 degrees    Diagnosis Line Normal sinus rhythm  Left axis deviation  Anteroseptal infarct (cited on or before 16-FEB-2020)  T wave abnormality, consider lateral ischemia  Abnormal ECG    < end of copied text >    < end of copied text >

## 2021-01-27 NOTE — H&P ADULT - PROBLEM SELECTOR PLAN 3
history of ischemic cardiomyopathy, Appears euvolemic on exam.   -2decho from 6/2020 as delineated above, no need to repeat    -c/w beta blocker   -unable to tolerate ace arb in the past given soft bp history of ischemic cardiomyopathy, Appears euvolemic on exam.   - TTE from 6/2020 - EF 20%, Mitral annular calcification, Mild aortic  regurgitation, Severe segmental left ventricular systolic dysfunction, Moderate diastolic dysfunction (Stage II)   -Continue metoprolol and ACEi history of ischemic cardiomyopathy, Appears euvolemic on exam.   - TTE from 6/2020 - EF 20%, Mitral annular calcification, Mild aortic  regurgitation, Severe segmental left ventricular systolic dysfunction, Moderate diastolic dysfunction (Stage II)   -Continue metoprolol and ACEi [medications and dosages confirmed with patient]

## 2021-01-27 NOTE — ED PROVIDER NOTE - OBJECTIVE STATEMENT
Pt is a 66 yo male with hx of cad, mi. pt pw 2 weeks of intermittent epigastric/chest pain.  pt denies sob, n/v, palpitations.  pt states he is off eloquis and plavix.  pt denes radiation and dicomfort now 1-2/10.  [facility] and sent to er    pmd: amari

## 2021-01-27 NOTE — H&P ADULT - PROBLEM SELECTOR PLAN 2
CAD (AWMI s/p BMS to LAD in 2013)  -no active signs of ischemia - chest pain likely more GI related  -continue home aspirin, statin and beta blocker  -plan as above

## 2021-01-27 NOTE — ED PROVIDER NOTE - CHPI ED SYMPTOMS NEG
no back pain/no cough/no dizziness/no fever/no nausea/no shortness of breath/no syncope/no chills/no diaphoresis

## 2021-01-27 NOTE — H&P ADULT - PROBLEM SELECTOR PLAN 4
hx fo ventricular tachycardia s/p VT ablation 2/19/2020 with ICD placed and ICD battery change on 2/2020  -currently in sinus rhythm  -continue to monitor on telemetry  -continue home metoprolol hx of ventricular tachycardia s/p VT ablation 2/19/2020 with ICD placed and ICD battery change on 2/2020  -currently in sinus rhythm  -continue to monitor on telemetry  -continue home metoprolol

## 2021-01-27 NOTE — ED ADULT NURSE NOTE - OBJECTIVE STATEMENT
Pt received in bed alert and oriented with the c/o mid chest pain which started since yesterday. Pt states that the pain doesn't radiate anywhere and it is causing great discomfort. As per Md's orders IV stefan placed blood specimen obtained and sent to the lab. Nursing care ongoing and safety maintained.

## 2021-01-27 NOTE — H&P ADULT - NSHPPHYSICALEXAM_GEN_ALL_CORE
T(C): 36.6 (01-27-21 @ 15:42), Max: 36.6 (01-27-21 @ 15:42)  HR: 74 (01-27-21 @ 15:42) (74 - 74)  BP: 133/83 (01-27-21 @ 15:42) (133/83 - 133/83)  RR: 15 (01-27-21 @ 15:42) (15 - 15)  SpO2: 97% (01-27-21 @ 15:42) (97% - 97%)    Physical Exam:  Gen: well appearing, NAD  HEENT: NCAT, PEERLA b/l, EOMI b/l, no conjunctival erythema  Cardio: regular rate and rhythm, +s1s2, no murmurs, rubs, or gallops  Pulm: CTA b/l, no wheezes, rales or rhonchi  Abdomen: soft, mild tenderness to palpation of epigastric region, nondistended, +BS x4 quadrants, no guarding  Extremities: no clubbing, cyanosis or edema, +2 pedal pulses  Neuro: AAOx3, CNII-XII intact grossly, 5/5 strength all extremities, sensation intact  Skin: warm and dry T(C): 36.6 (01-27-21 @ 15:42), Max: 36.6 (01-27-21 @ 15:42)  HR: 74 (01-27-21 @ 15:42) (74 - 74)  BP: 133/83 (01-27-21 @ 15:42) (133/83 - 133/83)  RR: 15 (01-27-21 @ 15:42) (15 - 15)  SpO2: 97% (01-27-21 @ 15:42) (97% - 97%)  Wt(kg): --    Physical Exam:   GENERAL: well-groomed, well-developed, NAD  HEENT: head NC/AT; EOM intact, PERRLA, conjunctiva & sclera clear; hearing grossly intact, moist mucous membranes  NECK: supple, no JVD  RESPIRATORY: CTA B/L, no wheezing, rales, rhonchi or rubs  CARDIOVASCULAR: S1&S2, RRR, no murmurs or gallops  ABDOMEN: soft, non-tender, non-distended, + Bowel sounds x4 quadrants, no guarding, rebound or rigidity  MUSCULOSKELETAL:  no clubbing, cyanosis or edema of all 4 extremities  LYMPH: no cervical lymphadenopathy  VASCULAR: Radial pulses 2+ bilaterally, no varicose veins   SKIN: warm and dry, color normal  NEUROLOGIC: AA&O X3, CN2-12 intact w/ no focal deficits, no sensory loss, motor Strength 5/5 in UE & LE B/L  Psych: Normal mood and affect, normal behavior

## 2021-01-27 NOTE — H&P ADULT - HISTORY OF PRESENT ILLNESS
65 year old male with past medical history of ischemic cardiomyopathy s/p ICD, CAD (AWMI s/p BMS to LAD in 2013), VT, s/p DCCV x 1. s/p LHC showing mild-moderate distal disease. s/p VT ablation 2/19/2020 sp ICD battery change on 2/2020 presenting with epigastric pain       IN THE ED:  Temp 97.9 F, HR 74, /83, RR 15 , SpO2 97% on RA  S/P aspirin 325mg, protonix 40mg IVP  EKG: Normal sinus rhythm  Left axis deviation  Anteroseptal infarct (cited on or before 16-FEB-2020)  T wave abnormality, consider lateral ischemia  Abnormal ECG  Labs significant for platelets 131, Cl 112, BUN 28, Cr .85, troponin <.15  Imaging: CXR: No evidence of active chest disease     65 year old male with past medical history of ischemic cardiomyopathy s/p ICD, CAD (AWMI s/p BMS to LAD in 2013), VT, s/p DCCV x 1. s/p LHC showing mild-moderate distal disease. s/p VT ablation 2/19/2020 sp ICD battery change on 2/2020, HTN, HLD presenting with epigastric pain. Patient states that for the past 2 weeks he has had worsening epigastric pain that radiates to the center of his chest. Patient has known history of gerd diagnosed on endoscopy about 2 years ago. Was taking pepcid daily for the past few years and was recently switched to ompeprazole by his doctor, around the same time of the worsening pain. Patient states he was worried about this constant pain and decided to come to the ED to be sure it was not cardiac in nature. Denies any palpitations or shortness of breath. Denies nausea, vomiting, or bloody stools. States the pain is burning in nature and is a 2/10 however at times is 4/10, states it is similiar to pain he has experienced in the past but more constat.      IN THE ED:  Temp 97.9 F, HR 74, /83, RR 15 , SpO2 97% on RA  S/P aspirin 325mg, protonix 40mg IVP  EKG: Normal sinus rhythm  Left axis deviation  Anteroseptal infarct (cited on or before 16-FEB-2020)  T wave abnormality, consider lateral ischemia  Abnormal ECG  Labs significant for platelets 131, Cl 112, BUN 28, Cr .85, troponin <.15  Imaging: CXR: No evidence of active chest disease

## 2021-01-27 NOTE — H&P ADULT - NSHPSOCIALHISTORY_GEN_ALL_CORE
former smoker, quit 36 years ago, 10 pack year history  Denies ETOH or illicit drugs  works in maintenance  lives alone  independent in ADLs, ambulates with out assistance

## 2021-01-27 NOTE — H&P ADULT - ASSESSMENT
65 year old male with past medical history of ischemic cardiomyopathy s/p ICD, CAD (AWMI s/p BMS to LAD in 2013), VT, s/p DCCV x 1. s/p LHC showing mild-moderate distal disease. s/p VT ablation 2/19/2020 sp ICD battery change on 2/2020, HTN, HLD and GERD presenting with epigastric pain. Admit for work up of chest pain

## 2021-01-27 NOTE — H&P ADULT - NSHPREVIEWOFSYSTEMS_GEN_ALL_CORE
Constitutional: denies fever, chills, sweating  HEENT: denies headache, dizziness, or lightheadedness  Respiratory: denies SOB, cough, or wheezing  Cardiovascular: denies CP, palpitations  Gastrointestinal: admits epigastric pain, denies nausea, vomiting, diarrhea, constipation, or bloody stools  Genitourinary: denies painful urination, increased frequency, urgency, or bloody urine  Skin/Breast: denies rashes or itching  Musculoskeletal: denies muscle aches, joint swelling, or muscle weakness  Neurologic: denies loss of sensation, numbness, or tingling  ROS negative except as noted above

## 2021-01-27 NOTE — H&P ADULT - NSICDXFAMILYHX_GEN_ALL_CORE_FT
FAMILY HISTORY:  No pertinent family history in first degree relatives     FAMILY HISTORY:  FH: heart attack

## 2021-01-27 NOTE — H&P ADULT - PROBLEM SELECTOR PLAN 1
persistent epigastric pain for past 2 weeks with known history of GERD as diagnosed by endoscopy  -likely 2/2 to GERD, however will rule out cardiogenic causes as patient with extensive cardiac history  -EKG: NSR with ST elevations unchanged from 3/2020  -troponins negative x1, continue to trend for 2 more sets  -s/p aspirin 325mg in ED, continue asa 81mg daily  -continue PPI  -Cardiology, Dr. Valdez consulted

## 2021-01-27 NOTE — ED ADULT TRIAGE NOTE - CHIEF COMPLAINT QUOTE
"Marlene been having chest pains for the past 2 weeks. I have gerd though.  But I went to Urgent Care to get checked out and they told me to come here"

## 2021-01-27 NOTE — CONSULT NOTE ADULT - ASSESSMENT
65 year old male with past medical history of ischemic cardiomyopathy s/p ICD, CAD (AWMI s/p BMS to LAD in 2013), VT, s/p DCCV x 1. s/p LHC showing mild-moderate distal disease. s/p VT ablation 2/19/2020 sp ICD battery change on 2/2020 presenting with epigastric pain     Ventricular Tachycardia   - s/p VT ablation 2/19/2020 and ICD gen change change    - Sinus rhythm on telemetry  -ekg unchanged from baseline NSR w   - continue home bb     Ischemic Cardiomyopathy   - Appears euvolemic on exam.   -2decho from 6/2020 as delineated above, no need to repeat    - c/w beta blocker   -unable to tolerate ace arb in the past given soft bp    CAD   - s/p LHC 2/2020 with non-obstructive CAD   - c/w ASA, Plavix, Lipitor , bb   -chest pain atypical appears to be GI, epigastric area, abdomen tender to touch - consider GI eval  -check cardiac enzymes x 3    Monitor and replete electrolytes. Keep K>4.0 and Mg>2.0.  Delia Lynch FNP-C  Cardiology NP  [facility] [address] [phone]       65 year old male with past medical history of ischemic cardiomyopathy s/p ICD, CAD (AWMI s/p BMS to LAD in 2013), VT, s/p DCCV x 1. s/p LHC showing mild-moderate distal disease. s/p VT ablation 2/19/2020 sp ICD battery change on 2/2020 presenting with epigastric pain     Ventricular Tachycardia   - s/p VT ablation 2/19/2020 and ICD gen change    - Sinus rhythm on telemetry  -ekg unchanged from baseline NSR w   - continue home bb     Ischemic Cardiomyopathy   - Appears euvolemic on exam.   -2decho from 6/2020 as delineated above, no need to repeat    - c/w beta blocker   -unable to tolerate ace arb in the past given soft bp    CAD   - s/p LHC 2/2020 with non-obstructive CAD   - c/w ASA, Plavix, Lipitor , bb   -chest pain atypical appears to be GI, epigastric area, abdomen tender to touch - consider GI eval  -check cardiac enzymes x 3    Monitor and replete electrolytes. Keep K>4.0 and Mg>2.0.  Delia Lynch FNP-C  Cardiology NP  [facility] [phone]       65 year old male with past medical history of ischemic cardiomyopathy s/p ICD, CAD (AWMI s/p BMS to LAD in 2013), VT, s/p DCCV x 1. s/p LHC showing mild-moderate distal disease. s/p VT ablation 2/19/2020 sp ICD battery change on 2/2020 presenting with epigastric pain     Ventricular Tachycardia   - s/p VT ablation 2/19/2020 and ICD gen change    - Sinus rhythm on telemetry  -ekg unchanged from baseline   - continue home bb     Ischemic Cardiomyopathy   - Appears euvolemic on exam.   -2decho from 6/2020 as delineated above, no need to repeat    - c/w beta blocker   -unable to tolerate ace arb in the past given soft bp    CAD   - s/p LHC 2/2020 with non-obstructive CAD   - c/w ASA, Plavix, Lipitor , bb   -chest pain atypical appears to be GI, epigastric area, abdomen tender to touch - consider GI eval  -check cardiac enzymes x 3, monitor on tele     Monitor and replete electrolytes. Keep K>4.0 and Mg>2.0.  Delia Lynch FNP-C  Cardiology NP  [facility] [phone]

## 2021-01-27 NOTE — ED PROVIDER NOTE - CLINICAL SUMMARY MEDICAL DECISION MAKING FREE TEXT BOX
pt with hx of mi within past year, aicd. pt p/w intermittent ekg and abn ekg althogh not much change from old.  check labs.  cards consult, asa, admit pt with hx of mi within past year, aicd. pt p/w intermittent ekg and abn ekg althogh not much change from old.  check labs.  cards consult, asa, admit as per cards

## 2021-01-28 ENCOUNTER — TRANSCRIPTION ENCOUNTER (OUTPATIENT)
Age: 66
End: 2021-01-28

## 2021-01-28 VITALS
RESPIRATION RATE: 16 BRPM | DIASTOLIC BLOOD PRESSURE: 64 MMHG | HEART RATE: 68 BPM | SYSTOLIC BLOOD PRESSURE: 104 MMHG | OXYGEN SATURATION: 96 % | TEMPERATURE: 97 F

## 2021-01-28 LAB
ANION GAP SERPL CALC-SCNC: 5 MMOL/L — SIGNIFICANT CHANGE UP (ref 5–17)
BASOPHILS # BLD AUTO: 0.02 K/UL — SIGNIFICANT CHANGE UP (ref 0–0.2)
BASOPHILS NFR BLD AUTO: 0.4 % — SIGNIFICANT CHANGE UP (ref 0–2)
BUN SERPL-MCNC: 23 MG/DL — SIGNIFICANT CHANGE UP (ref 7–23)
CALCIUM SERPL-MCNC: 8 MG/DL — LOW (ref 8.5–10.1)
CHLORIDE SERPL-SCNC: 113 MMOL/L — HIGH (ref 96–108)
CK MB BLD-MCNC: 2.2 % — SIGNIFICANT CHANGE UP (ref 0–3.5)
CK MB BLD-MCNC: 2.9 % — SIGNIFICANT CHANGE UP (ref 0–3.5)
CK MB CFR SERPL CALC: 1.9 NG/ML — SIGNIFICANT CHANGE UP (ref 0–3.6)
CK MB CFR SERPL CALC: 2.6 NG/ML — SIGNIFICANT CHANGE UP (ref 0–3.6)
CK SERPL-CCNC: 85 U/L — SIGNIFICANT CHANGE UP (ref 26–308)
CK SERPL-CCNC: 89 U/L — SIGNIFICANT CHANGE UP (ref 26–308)
CO2 SERPL-SCNC: 27 MMOL/L — SIGNIFICANT CHANGE UP (ref 22–31)
CREAT SERPL-MCNC: 0.91 MG/DL — SIGNIFICANT CHANGE UP (ref 0.5–1.3)
EOSINOPHIL # BLD AUTO: 0.17 K/UL — SIGNIFICANT CHANGE UP (ref 0–0.5)
EOSINOPHIL NFR BLD AUTO: 3.3 % — SIGNIFICANT CHANGE UP (ref 0–6)
GLUCOSE SERPL-MCNC: 77 MG/DL — SIGNIFICANT CHANGE UP (ref 70–99)
HCT VFR BLD CALC: 40.7 % — SIGNIFICANT CHANGE UP (ref 39–50)
HGB BLD-MCNC: 13.6 G/DL — SIGNIFICANT CHANGE UP (ref 13–17)
IMM GRANULOCYTES NFR BLD AUTO: 0.2 % — SIGNIFICANT CHANGE UP (ref 0–1.5)
LYMPHOCYTES # BLD AUTO: 1.4 K/UL — SIGNIFICANT CHANGE UP (ref 1–3.3)
LYMPHOCYTES # BLD AUTO: 26.9 % — SIGNIFICANT CHANGE UP (ref 13–44)
MAGNESIUM SERPL-MCNC: 2.1 MG/DL — SIGNIFICANT CHANGE UP (ref 1.6–2.6)
MCHC RBC-ENTMCNC: 30.1 PG — SIGNIFICANT CHANGE UP (ref 27–34)
MCHC RBC-ENTMCNC: 33.4 GM/DL — SIGNIFICANT CHANGE UP (ref 32–36)
MCV RBC AUTO: 90 FL — SIGNIFICANT CHANGE UP (ref 80–100)
MONOCYTES # BLD AUTO: 0.52 K/UL — SIGNIFICANT CHANGE UP (ref 0–0.9)
MONOCYTES NFR BLD AUTO: 10 % — SIGNIFICANT CHANGE UP (ref 2–14)
NEUTROPHILS # BLD AUTO: 3.09 K/UL — SIGNIFICANT CHANGE UP (ref 1.8–7.4)
NEUTROPHILS NFR BLD AUTO: 59.2 % — SIGNIFICANT CHANGE UP (ref 43–77)
NRBC # BLD: 0 /100 WBCS — SIGNIFICANT CHANGE UP (ref 0–0)
PHOSPHATE SERPL-MCNC: 2.9 MG/DL — SIGNIFICANT CHANGE UP (ref 2.5–4.5)
PLATELET # BLD AUTO: 125 K/UL — LOW (ref 150–400)
POTASSIUM SERPL-MCNC: 3.7 MMOL/L — SIGNIFICANT CHANGE UP (ref 3.5–5.3)
POTASSIUM SERPL-SCNC: 3.7 MMOL/L — SIGNIFICANT CHANGE UP (ref 3.5–5.3)
RBC # BLD: 4.52 M/UL — SIGNIFICANT CHANGE UP (ref 4.2–5.8)
RBC # FLD: 13.4 % — SIGNIFICANT CHANGE UP (ref 10.3–14.5)
SODIUM SERPL-SCNC: 145 MMOL/L — SIGNIFICANT CHANGE UP (ref 135–145)
TROPONIN I SERPL-MCNC: <.015 NG/ML — SIGNIFICANT CHANGE UP (ref 0.01–0.04)
TROPONIN I SERPL-MCNC: <.015 NG/ML — SIGNIFICANT CHANGE UP (ref 0.01–0.04)
WBC # BLD: 5.21 K/UL — SIGNIFICANT CHANGE UP (ref 3.8–10.5)
WBC # FLD AUTO: 5.21 K/UL — SIGNIFICANT CHANGE UP (ref 3.8–10.5)

## 2021-01-28 PROCEDURE — 84484 ASSAY OF TROPONIN QUANT: CPT

## 2021-01-28 PROCEDURE — 99285 EMERGENCY DEPT VISIT HI MDM: CPT

## 2021-01-28 PROCEDURE — 93005 ELECTROCARDIOGRAM TRACING: CPT

## 2021-01-28 PROCEDURE — 85025 COMPLETE CBC W/AUTO DIFF WBC: CPT

## 2021-01-28 PROCEDURE — U0005: CPT

## 2021-01-28 PROCEDURE — 82550 ASSAY OF CK (CPK): CPT

## 2021-01-28 PROCEDURE — 80048 BASIC METABOLIC PNL TOTAL CA: CPT

## 2021-01-28 PROCEDURE — U0003: CPT

## 2021-01-28 PROCEDURE — 83690 ASSAY OF LIPASE: CPT

## 2021-01-28 PROCEDURE — 82553 CREATINE MB FRACTION: CPT

## 2021-01-28 PROCEDURE — 99239 HOSP IP/OBS DSCHRG MGMT >30: CPT | Mod: GC

## 2021-01-28 PROCEDURE — 80053 COMPREHEN METABOLIC PANEL: CPT

## 2021-01-28 PROCEDURE — 99232 SBSQ HOSP IP/OBS MODERATE 35: CPT

## 2021-01-28 PROCEDURE — 36415 COLL VENOUS BLD VENIPUNCTURE: CPT

## 2021-01-28 PROCEDURE — 84100 ASSAY OF PHOSPHORUS: CPT

## 2021-01-28 PROCEDURE — G0378: CPT

## 2021-01-28 PROCEDURE — 83735 ASSAY OF MAGNESIUM: CPT

## 2021-01-28 PROCEDURE — 71045 X-RAY EXAM CHEST 1 VIEW: CPT

## 2021-01-28 RX ADMIN — ENOXAPARIN SODIUM 40 MILLIGRAM(S): 100 INJECTION SUBCUTANEOUS at 10:10

## 2021-01-28 RX ADMIN — LISINOPRIL 20 MILLIGRAM(S): 2.5 TABLET ORAL at 05:06

## 2021-01-28 RX ADMIN — Medication 50 MILLIGRAM(S): at 05:06

## 2021-01-28 RX ADMIN — Medication 81 MILLIGRAM(S): at 10:10

## 2021-01-28 RX ADMIN — PANTOPRAZOLE SODIUM 40 MILLIGRAM(S): 20 TABLET, DELAYED RELEASE ORAL at 05:06

## 2021-01-28 NOTE — PROGRESS NOTE ADULT - ASSESSMENT
65 year old male with past medical history of ischemic cardiomyopathy s/p ICD, CAD (AWMI s/p BMS to LAD in 2013), VT, s/p DCCV x 1. s/p LHC showing mild-moderate distal disease. s/p VT ablation 2/19/2020 sp ICD battery change on 2/2020 presenting with epigastric pain     CAD/Epigastric pain   - S/p LHC 2/2020 with non-obstructive CAD  - Continue ASA, Plavix, Lipitor , bb   - Chest pain atypical appears to be GI, epigastric area, abdomen tender to touch - consider GI eval  - Troponin I: <--<.015, <--<.015, <--<.015  - Telemetry with no events. Can discontinue telemetry.     Ventricular Tachycardia  - s/p VT ablation 2/19/2020 and ICD gen change   - Sinus rhythm on telemetry  - EKG unchanged from baseline  - Continue Toprol XL    Ischemic Cardiomyopathy   - Appears euvolemic on exam.   - ECHO from 6/2020 which showed MAC, mod MR, severe LVSD, stage 2 DD, mild RV enlargement, normal RV size and function, EF 20%  - Continue Toprol 50 XL  - Continue Lisinopril     - Monitor and replete lytes, keep K>4, Mg>2.  - All other medical needs as per primary team.  - Other cardiovascular workup will depend on clinical course.  - Will continue to follow.    Lilliam Ngo, MS ORTIZP, [facility] [place]  Nurse Practitioner- Cardiology   [phone]

## 2021-01-28 NOTE — DISCHARGE NOTE PROVIDER - CARE PROVIDERS DIRECT ADDRESSES
[email or web address],DirectAddress_Unknown ,[email or web address],DirectAddress_Unknown,[email or web address],[email or web address]

## 2021-01-28 NOTE — PROGRESS NOTE ADULT - SUBJECTIVE AND OBJECTIVE BOX
[facility] -- Андрей Cardenas, José Miguel, Joshua, Thor Agudelo Savella, Goodger: Office # [phone]    Follow Up:  Epigastric pain     Subjective/Observations: Patient seen and examined. Patient awake, alert, resting in bed. Patient denies any chest pain. No further epigastric pain, but complaining of pain in umbilical area. Denies SOB, dizziness, palpitations. No signs of orthopnea or PND. Tolerating room air.     REVIEW OF SYSTEMS: All review of systems is negative for eye, ENT, GI, , allergic, dermatologic, musculoskeletal and neurologic except as described above    PAST MEDICAL & SURGICAL HISTORY:  Hyperlipidemia  HTN (hypertension)  Stented coronary artery  MI (myocardial infarction)  History of tonsillectomy    MEDICATIONS  (STANDING):  aspirin  chewable 81 milliGRAM(s) Oral daily  atorvastatin 80 milliGRAM(s) Oral at bedtime  enoxaparin Injectable 40 milliGRAM(s) SubCutaneous daily  lisinopril 20 milliGRAM(s) Oral daily  metoprolol succinate ER 50 milliGRAM(s) Oral daily  pantoprazole    Tablet 40 milliGRAM(s) Oral before breakfast    MEDICATIONS  (PRN):    Allergies  No Known Allergies    Vital Signs Last 24 Hrs  T(C): 36.7 (28 Jan 2021 05:10), Max: 36.9 (27 Jan 2021 22:30)  T(F): 98.1 (28 Jan 2021 05:10), Max: 98.4 (27 Jan 2021 22:30)  HR: 70 (28 Jan 2021 05:10) (65 - 74)  BP: 117/69 (28 Jan 2021 05:10) (104/54 - 133/83)  BP(mean): --  RR: 16 (28 Jan 2021 05:10) (15 - 16)  SpO2: 98% (28 Jan 2021 05:10) (97% - 98%)  I&O's Summary    Weight (kg): 72.6 (01-27 @ 15:42)    TELE: SR, 4 beats VT  PHYSICAL EXAM:  Appearance: NAD, no distress, alert, Well developed   HEENT: Moist Mucous Membranes, Anicteric  Cardiovascular: Regular rate and rhythm, Normal S1 S2, No JVD, + murmurs, No rubs, gallops or clicks  Respiratory: Non-labored, Clear to auscultation, No rales, No rhonchi, No wheezing.   Gastrointestinal:  Soft, Non-tender, + BS  Neurologic: Non-focal  Skin: Warm and dry, No visible rashes or ulcers, No ecchymosis, No cyanosis  Musculoskeletal: No clubbing, No cyanosis, No joint swelling/tenderness  Psychiatry: Mood & affect appropriate  Lymph: No peripheral edema.     LABS: All Labs Reviewed:                        13.6   5.21  )-----------( 125      ( 28 Jan 2021 05:41 )             40.7                         14.3   7.57  )-----------( 131      ( 27 Jan 2021 17:39 )             42.1     28 Jan 2021 05:41    145    |  113    |  23     ----------------------------<  77     3.7     |  27     |  0.91   27 Jan 2021 17:39    145    |  112    |  28     ----------------------------<  79     4.1     |  25     |  0.85     Ca    8.0        28 Jan 2021 05:41  Ca    8.4        27 Jan 2021 17:39  Phos  2.9       28 Jan 2021 05:41  Mg     2.1       28 Jan 2021 05:41  Mg     2.3       27 Jan 2021 17:39    TPro  6.5    /  Alb  3.4    /  TBili  0.9    /  DBili  x      /  AST  20     /  ALT  23     /  AlkPhos  57     27 Jan 2021 17:39  Creatine Kinase, Serum: 85 U/L (01-28-21 @ 05:41)  Troponin I, Serum: <.015 ng/mL (01-28-21 @ 05:41)  Creatine Kinase, Serum: 89 U/L (01-28-21 @ 00:44)    < from: TTE with Doppler (w/Cont) (06.03.20 @ 10:24) >  Dimensions:    Normal Values:  LA:     3.6    2.0 - 4.0 cm  Ao:     3.0    2.0 - 3.8 cm  SEPTUM: 0.8    0.6 - 1.2 cm  PWT:    0.8    0.6 - 1.1 cm  LVIDd:  6.1    3.0 - 5.6 cm  LVIDs:  5.2    1.8 - 4.0 cm  Derived variables:  LVMI: 111 g/m2  RWT: 0.26  Fractional short: 15 %  EF (Visual Estimate): 20 %  Doppler Peak Velocity (m/sec): AoV=1.6  ------------------------------------------------------------------------  Observations:  Mitral Valve: Mitral annular calcification, otherwise  normal mitral valve. Moderate mitral regurgitation.  Aortic Valve/Aorta: Normal trileaflet aortic valve. Peak  transaortic valve gradient equals 10 mm Hg. Mild aortic  regurgitation.  Peak left ventricular outflow tract  gradient equals 4 mm Hg.  Aortic Root: 3 cm.  Left Atrium: Moderately dilated left atrium.  LA volume  index = 47 cc/m2.  Left Ventricle: Severe segmental left ventricular systolic  dysfunction. Endocardial visualization enhanced with  intravenous injection of Ultrasonic Enhancing Agent  (Definity). No left ventricular thrombus.  There is  swirling of Ultrasonic Enhancing Agent (Definity) in the  aprex indicating a low flow state.  Severe left ventricular  enlargement. Moderate diastolic dysfunction (Stage II).  Right Heart: Mild right atrial enlargement. Mild right  ventricular enlargement with normal right ventricular  systolic function. A device wire is noted in the right  heart. Normal tricuspid valve. Moderate tricuspid  regurgitation. Normal pulmonic valve. Minimal pulmonic  regurgitation.  Pericardium/Pleura: Normal pericardium with trace  pericardial effusion.  Hemodynamic: Estimated right ventricular systolic pressure  equals 32 mm Hg, assuming right atrial pressure equals 3 mm  Hg, consistent with normal pulmonary pressures.  ------------------------------------------------------------------------  Conclusions:  1. Mitral annular calcification, otherwise normal mitral valve. Moderate mitral regurgitation.  2. Normal trileaflet aortic valve. Mild aortic regurgitation.  3. Severe segmental left ventricular systolic dysfunction. Endocardial visualization enhanced with intravenous injection of Ultrasonic Enhancing Agent (Definity). No left ventricular thrombus.  There is swirling of Ultrasonic Enhancing Agent (Definity) in the aprex indicating a low flow state.  4. Moderate diastolic dysfunction (Stage II).  5. Mild right ventricular enlargement with normal right ventricular systolic function. A device wire is noted in the right heart.  6. Normal pericardium with trace pericardial effusion. *** Compared with echocardiogram of 2/17/2020, no significant changes noted.  < end of copied text >    < from: Cardiac Cath Lab - Adult (02.18.20 @ 19:56) >  VENTRICLES: No left ventriculogram was performed.  CORONARY VESSELS: The coronary circulation is right dominant.  LM:   --  LM: Normal.  LAD:   --  Proximal LAD: There was a diffuse 0 % stenosis at the site of a prior angioplasty with stent placement.  CX:   --  Circumflex: Angiography showed minor luminalirregularities with no flow limiting lesions.  RCA:   --  RCA: Angiography showed minor luminal irregularities with no flow limiting lesions.  COMPLICATIONS: There were no complications.  DIAGNOSTIC RECOMMENDATIONS: Medical management is recommended.  < end of copied text >    < from: Xray Chest 1 View- PORTABLE-Urgent (01.27.21 @ 17:21) >  IMPRESSION:   No evidence of active chest disease.  < end of copied text >

## 2021-01-28 NOTE — DISCHARGE NOTE PROVIDER - NSDCCPCAREPLAN_GEN_ALL_CORE_FT
PRINCIPAL DISCHARGE DIAGNOSIS  Diagnosis: Epigastric pain  Assessment and Plan of Treatment: You were admitted for further evaluation of your epigastric pain. You were seen and evaluated by cardiology. Your cardiac enzymes were within normal limits. -- Please follow up with your gasteroenterologist within 1 week of discharge. Return to the emergency room immediately if you experience fevers, chills, visual changes, sweating, chest pain, shortness of breath, nausea.      SECONDARY DISCHARGE DIAGNOSES  Diagnosis: HTN (hypertension)  Assessment and Plan of Treatment: Please continue to take your home Lisinopril, Toprol, aspirin as prescribed. Follow up with your cardiologist for your scheduled visits.    Diagnosis: Hyperlipidemia  Assessment and Plan of Treatment: Please continue to take your atorvastatin as prescribed. Follow up with your PCP for routine cholesterol test. Maintain low fat diet. Exercise 3x weekly if able to tolerate.     PRINCIPAL DISCHARGE DIAGNOSIS  Diagnosis: Epigastric pain  Assessment and Plan of Treatment: You were admitted for further evaluation of your epigastric pain. You were seen and evaluated by cardiology. Your cardiac enzymes were within normal limits. -- Please follow up with your gasteroenterologist within 1 week of discharge. Return to the emergency room immediately if you experience fevers, chills, visual changes, sweating, chest pain, shortness of breath, nausea.      SECONDARY DISCHARGE DIAGNOSES  Diagnosis: Thrombocytopenia  Assessment and Plan of Treatment: Laboratory findings demonstrated thrombocytopenia, also known as low platelet levels. ---    Diagnosis: HTN (hypertension)  Assessment and Plan of Treatment: Please continue to take your home Lisinopril, Toprol, aspirin as prescribed. Follow up with your cardiologist for your scheduled visits.    Diagnosis: Hyperlipidemia  Assessment and Plan of Treatment: Please continue to take your atorvastatin as prescribed. Follow up with your PCP for routine cholesterol test. Maintain low fat diet. Exercise 3x weekly if able to tolerate.     PRINCIPAL DISCHARGE DIAGNOSIS  Diagnosis: Epigastric pain  Assessment and Plan of Treatment: You were admitted for further evaluation of your epigastric pain. You were seen and evaluated by cardiology. Your cardiac enzymes were within normal limits. Please follow up with your gasteroenterologist within 1 week of discharge. You will likely need an outpatient endoscopy for your symptoms. Continue to take your recently prescribed omeprazole. Avoid excessive caffeine, alcohol, foods in high acidity, spicy foods for symptom control, NSAIDs. Return to the emergency room immediately if you experience fevers, chills, visual changes, sweating, chest pain, shortness of breath, nausea.      SECONDARY DISCHARGE DIAGNOSES  Diagnosis: Thrombocytopenia  Assessment and Plan of Treatment: Laboratory findings demonstrated thrombocytopenia, also known as low platelet levels. Follow up with your primary care provider for further evaluation.    Diagnosis: HTN (hypertension)  Assessment and Plan of Treatment: Please continue to take your home Lisinopril, Toprol, aspirin as prescribed. Follow up with your cardiologist for your scheduled visits. You should undergo a stress test outpatient with your cardiologist.    Diagnosis: Hyperlipidemia  Assessment and Plan of Treatment: Please continue to take your atorvastatin as prescribed. Follow up with your PCP for routine cholesterol test. Maintain low fat diet. Exercise 3x weekly if able to tolerate.     PRINCIPAL DISCHARGE DIAGNOSIS  Diagnosis: Epigastric pain  Assessment and Plan of Treatment: You were admitted for further evaluation of your epigastric pain. You were seen and evaluated by cardiology. Your cardiac enzymes were within normal limits, please follow up with outpatient cardiology and  gasteroenterologist within 1 week of discharge for further care and maangement. You will likely need an outpatient endoscopy for your symptoms. Continue to take your recently prescribed omeprazole. Avoid excessive caffeine, alcohol, foods in high acidity, spicy foods for symptom control, NSAIDs. Return to the emergency room immediately if you experience fevers, chills, visual changes, sweating, chest pain, shortness of breath, nausea.      SECONDARY DISCHARGE DIAGNOSES  Diagnosis: Thrombocytopenia  Assessment and Plan of Treatment: Laboratory findings demonstrated thrombocytopenia, also known as low platelet levels. Follow up with your primary care provider for further evaluation.    Diagnosis: Hyperlipidemia  Assessment and Plan of Treatment: Please continue to take your atorvastatin as prescribed. Follow up with your PCP for routine cholesterol test. Maintain low fat diet. Exercise 3x weekly if able to tolerate.    Diagnosis: HTN (hypertension)  Assessment and Plan of Treatment: Please continue to take your home Lisinopril, Toprol, aspirin as prescribed. Follow up with your cardiologist for your scheduled visits. You should undergo a stress test outpatient with your cardiologist.

## 2021-01-28 NOTE — DISCHARGE NOTE PROVIDER - CARE PROVIDER_API CALL
Kellie Bear  INTERNAL MEDICINE  [address] [place] [address]  Phone: [phone]  Fax: [phone]  Established Patient  Follow Up Time: 1 week    Lisandro Christiansen)  Residency  Medicine  [address] [place] [address]  Phone: ()-  Fax: ()-  Established Patient  Follow Up Time: Routine   Kellie Bear S  INTERNAL MEDICINE  [address] [place] [address]  Phone: [phone]  Fax: [phone]  Established Patient  Follow Up Time: 1 week    Lisandro Christiansen)  Residency  Medicine  [address] [place] [address]  Phone: ()-  Fax: ()-  Established Patient  Follow Up Time: Routine    Ferrada,   Outpatient GI  Phone: (   )    -  Fax: (   )    -  Established Patient  Follow Up Time:    Kellie Bear  INTERNAL MEDICINE  [address] [place] [address]  Phone: [phone]  Fax: [phone]  Established Patient  Follow Up Time: 1 week    Lisandro Christiansen)  Residency  Medicine  [address] [place] [address]  Phone: ()-  Fax: ()-  Established Patient  Follow Up Time: Routine    Ferrada,   Outpatient GI  Phone: (   )    -  Fax: (   )    -  Established Patient  Follow Up Time:     Navi Agudelo)  Cardio [facility]  [address] [place] [address]  Phone: [phone]  Fax: [phone]  Follow Up Time: 1 week    Alejandro Victoria ()  Internal Medicine  [address] [place] [address]  Phone: [phone]  Fax: [phone]  Follow Up Time: 1 week

## 2021-01-28 NOTE — DISCHARGE NOTE NURSING/CASE MANAGEMENT/SOCIAL WORK - PATIENT PORTAL LINK FT
You can access the FollowMyHealth Patient Portal offered by [facility] by registering at the following website: [email or web address]. By joining [facility]’s FollowMyHealth portal, you will also be able to view your health information using other applications (apps) compatible with our system.

## 2021-01-28 NOTE — DISCHARGE NOTE PROVIDER - NSDCACTIVITY_GEN_ALL_CORE
Return to Work/School allowed/Bathing allowed/Showering allowed/Stairs allowed/Driving allowed/Walking - Indoors allowed/No heavy lifting/straining/Walking - Outdoors allowed Showering allowed/Stairs allowed/Driving allowed/Walking - Indoors allowed/No heavy lifting/straining/Walking - Outdoors allowed

## 2021-01-28 NOTE — DISCHARGE NOTE PROVIDER - NSDCMRMEDTOKEN_GEN_ALL_CORE_FT
aspirin 81 mg oral tablet: 1 tab(s) orally once a day  atorvastatin 80 mg oral tablet: 1 tab(s) orally once a day (at bedtime)  lisinopril 20 mg oral tablet: 1 tab(s) orally once a day  omeprazole 40 mg oral delayed release capsule: 1 cap(s) orally once a day  Toprol-XL 50 mg oral tablet, extended release: 1 tab(s) orally once a day  Vitamin D3 1000 intl units (25 mcg) oral tablet: 1 tab(s) orally once a day

## 2021-01-28 NOTE — DISCHARGE NOTE PROVIDER - PROVIDER TOKENS
PROVIDER:[TOKEN:[[id number]],FOLLOWUP:[1 week],ESTABLISHEDPATIENT:[T]],PROVIDER:[TOKEN:[[id number]],FOLLOWUP:[Routine],ESTABLISHEDPATIENT:[T]] PROVIDER:[TOKEN:[[id number]],FOLLOWUP:[1 week],ESTABLISHEDPATIENT:[T]],PROVIDER:[TOKEN:[[id number]],FOLLOWUP:[Routine],ESTABLISHEDPATIENT:[T]],FREE:[LAST:[Ferrada],PHONE:[(   )    -],FAX:[(   )    -],ADDRESS:[Outpatient GI],ESTABLISHEDPATIENT:[T]] PROVIDER:[TOKEN:[[id number]],FOLLOWUP:[1 week],ESTABLISHEDPATIENT:[T]],PROVIDER:[TOKEN:[[id number]],FOLLOWUP:[Routine],ESTABLISHEDPATIENT:[T]],FREE:[LAST:[Ferrada],PHONE:[(   )    -],FAX:[(   )    -],ADDRESS:[Outpatient ],ESTABLISHEDPATIENT:[T]],PROVIDER:[TOKEN:[[id number]],FOLLOWUP:[1 week]],PROVIDER:[TOKEN:[[id number]],FOLLOWUP:[1 week]]

## 2021-01-28 NOTE — DISCHARGE NOTE PROVIDER - HOSPITAL COURSE
FROM ADMISSION H+P: 65 year old male with past medical history of ischemic cardiomyopathy s/p ICD, CAD (AWMI s/p BMS to LAD in 2013), VT, s/p DCCV x 1. s/p LHC showing mild-moderate distal disease. s/p VT ablation 2/19/2020 sp ICD battery change on 2/2020, HTN, HLD presenting with epigastric pain. Patient states that for the past 2 weeks he has had worsening epigastric pain that radiates to the center of his chest. Patient has known history of gerd diagnosed on endoscopy about 2 years ago. Was taking pepcid daily for the past few years and was recently switched to ompeprazole by his doctor, around the same time of the worsening pain. Patient states he was worried about this constant pain and decided to come to the ED to be sure it was not cardiac in nature. Denies any palpitations or shortness of breath. Denies nausea, vomiting, or bloody stools. States the pain is burning in nature and is a 2/10 however at times is 4/10, states it is similiar to pain he has experienced in the past but more constat.  IN THE ED:  Temp 97.9 F, HR 74, /83, RR 15 , SpO2 97% on RA  S/P aspirin 325mg, protonix 40mg IVP  EKG: Normal sinus rhythm  Left axis deviation  Anteroseptal infarct (cited on or before 16-FEB-2020)  T wave abnormality, consider lateral ischemia  Abnormal ECG  Labs significant for platelets 131, Cl 112, BUN 28, Cr .85, troponin <.15  Imaging: CXR: No evidence of active chest disease  ---  HOSPITAL COURSE: This 64 y/o male presented with epigastric pain, was admitted to telemetry for further work up of pain. Cardiology was consulted and evaluated the patient. Cardiac enzymes were trended, resulted negative x3. Patient was tested for Covid-19 which also resulted negative. Patient given ASA 325mg PO x1 then resumed home dose of ASA 81mg PO qd. Home anti-hypertensives and statin were continued. Patient placed on a DASH/TLC diet which was tolerated well.     Patient showed improvement throughout hospitalization. Patient was seen and examined on day of discharge      VITALS:   T(C): 36.7 (01-28-21 @ 05:10), Max: 36.9 (01-27-21 @ 22:30)  HR: 70 (01-28-21 @ 05:10) (65 - 74)  BP: 117/69 (01-28-21 @ 05:10) (104/54 - 133/83)  RR: 16 (01-28-21 @ 05:10) (15 - 16)  SpO2: 98% (01-28-21 @ 05:10) (97% - 98%)    PHYSICAL EXAM:    Patient is stable for discharge as per primary medical team and consultants. Patient was medically optimized for discharge with close outpatient follow up.    ---  CONSULTANTS:   Cardiology: Dr. Valdez   ---  TIME SPENT:  I, the attending physician, was physically present for the key portions of the evaluation and management (E/M) service provided. The total amount of time spent reviewing the hospital notes, laboratory values, imaging findings, assessing/counseling the patient, discussing with consultant physicians, social work, nursing staff was -- minutes    ---  FINAL DISCHARGE DIAGNOSIS LIST:  Please see last daily progress note for final discharge diagnoses    ---  Primary care provider was made aware of plan for discharge:      [  ] NO     [ x ] YES   FROM ADMISSION H+P: 65 year old male with past medical history of ischemic cardiomyopathy s/p ICD, CAD (AWMI s/p BMS to LAD in 2013), VT, s/p DCCV x 1. s/p LHC showing mild-moderate distal disease. s/p VT ablation 2/19/2020 sp ICD battery change on 2/2020, HTN, HLD presenting with epigastric pain. Patient states that for the past 2 weeks he has had worsening epigastric pain that radiates to the center of his chest. Patient has known history of gerd diagnosed on endoscopy about 2 years ago. Was taking pepcid daily for the past few years and was recently switched to ompeprazole by his doctor, around the same time of the worsening pain. Patient states he was worried about this constant pain and decided to come to the ED to be sure it was not cardiac in nature. Denies any palpitations or shortness of breath. Denies nausea, vomiting, or bloody stools. States the pain is burning in nature and is a 2/10 however at times is 4/10, states it is similiar to pain he has experienced in the past but more constat.  IN THE ED:  Temp 97.9 F, HR 74, /83, RR 15 , SpO2 97% on RA  S/P aspirin 325mg, protonix 40mg IVP  EKG: Normal sinus rhythm  Left axis deviation  Anteroseptal infarct (cited on or before 16-FEB-2020)  T wave abnormality, consider lateral ischemia  Abnormal ECG  Labs significant for platelets 131, Cl 112, BUN 28, Cr .85, troponin <.15  Imaging: CXR: No evidence of active chest disease  ---  HOSPITAL COURSE: This 64 y/o male presented with epigastric pain, was admitted to telemetry for further work up of pain. Cardiology was consulted and evaluated the patient. Cardiac enzymes were trended, resulted negative x3. Patient was tested for Covid-19 which also resulted negative. Patient given ASA 325mg PO x1 then resumed home dose of ASA 81mg PO qd. Home anti-hypertensives and statin were continued. Patient placed on a DASH/TLC diet which was tolerated well. Significant laboratory findings included thrombocytopenia (131 -> 125) --      Patient showed improvement throughout hospitalization. Patient was seen and examined on day of discharge      VITALS:   T(C): 36.7 (01-28-21 @ 05:10), Max: 36.9 (01-27-21 @ 22:30)  HR: 70 (01-28-21 @ 05:10) (65 - 74)  BP: 117/69 (01-28-21 @ 05:10) (104/54 - 133/83)  RR: 16 (01-28-21 @ 05:10) (15 - 16)  SpO2: 98% (01-28-21 @ 05:10) (97% - 98%)    PHYSICAL EXAM:    Patient is stable for discharge as per primary medical team and consultants. Patient was medically optimized for discharge with close outpatient follow up.    ---  CONSULTANTS:   Cardiology: Dr. Valdez   ---  TIME SPENT:  I, the attending physician, was physically present for the key portions of the evaluation and management (E/M) service provided. The total amount of time spent reviewing the hospital notes, laboratory values, imaging findings, assessing/counseling the patient, discussing with consultant physicians, social work, nursing staff was -- minutes    ---  FINAL DISCHARGE DIAGNOSIS LIST:  Please see last daily progress note for final discharge diagnoses    ---  Primary care provider was made aware of plan for discharge:      [  ] NO     [ x ] YES   FROM ADMISSION H+P: 65 year old male with past medical history of ischemic cardiomyopathy s/p ICD, CAD (AWMI s/p BMS to LAD in 2013), VT, s/p DCCV x 1. s/p LHC showing mild-moderate distal disease. s/p VT ablation 2/19/2020 sp ICD battery change on 2/2020, HTN, HLD presenting with epigastric pain. Patient states that for the past 2 weeks he has had worsening epigastric pain that radiates to the center of his chest. Patient has known history of gerd diagnosed on endoscopy about 2 years ago. Was taking pepcid daily for the past few years and was recently switched to ompeprazole by his doctor, around the same time of the worsening pain. Patient states he was worried about this constant pain and decided to come to the ED to be sure it was not cardiac in nature. Denies any palpitations or shortness of breath. Denies nausea, vomiting, or bloody stools. States the pain is burning in nature and is a 2/10 however at times is 4/10, states it is similiar to pain he has experienced in the past but more constat.  IN THE ED:  Temp 97.9 F, HR 74, /83, RR 15 , SpO2 97% on RA  S/P aspirin 325mg, protonix 40mg IVP  EKG: Normal sinus rhythm  Left axis deviation  Anteroseptal infarct (cited on or before 16-FEB-2020)  T wave abnormality, consider lateral ischemia  Abnormal ECG  Labs significant for platelets 131, Cl 112, BUN 28, Cr .85, troponin <.15  Imaging: CXR: No evidence of active chest disease  ---  HOSPITAL COURSE: This 66 y/o male presented with epigastric pain, was admitted to telemetry for further work up of pain. Cardiology was consulted and evaluated the patient. Cardiac enzymes were trended, resulted negative x3. Patient was tested for Covid-19 which also resulted negative. Patient given ASA 325mg PO x1 then resumed home dose of ASA 81mg PO qd. Home anti-hypertensives and statin were continued. Patient was given Protonix. Patient placed on a DASH/TLC diet which was tolerated well. Significant laboratory findings included thrombocytopenia (131 -> 125) for which the patient will need an outpatient evaluation. Patient showed improvement throughout hospitalization. Patient was seen and examined on day of discharge:    VITALS:   T(C): 36.7 (01-28-21 @ 05:10), Max: 36.9 (01-27-21 @ 22:30)  HR: 70 (01-28-21 @ 05:10) (65 - 74)  BP: 117/69 (01-28-21 @ 05:10) (104/54 - 133/83)  RR: 16 (01-28-21 @ 05:10) (15 - 16)  SpO2: 98% (01-28-21 @ 05:10) (97% - 98%)    PHYSICAL EXAM:    Patient is stable for discharge as per primary medical team and consultants. Patient was medically optimized for discharge with close outpatient follow up.    ---  CONSULTANTS:   Cardiology: Dr. Valdez   ---  TIME SPENT:  I, the attending physician, was physically present for the key portions of the evaluation and management (E/M) service provided. The total amount of time spent reviewing the hospital notes, laboratory values, imaging findings, assessing/counseling the patient, discussing with consultant physicians, social work, nursing staff was -- minutes    ---  FINAL DISCHARGE DIAGNOSIS LIST:  Please see last daily progress note for final discharge diagnoses    ---  Primary care provider was made aware of plan for discharge:      [  ] NO     [ x ] YES   FROM ADMISSION H+P: 65 year old male with past medical history of ischemic cardiomyopathy s/p ICD, CAD (AWMI s/p BMS to LAD in 2013), VT, s/p DCCV x 1. s/p LHC showing mild-moderate distal disease. s/p VT ablation 2/19/2020 sp ICD battery change on 2/2020, HTN, HLD presenting with epigastric pain. Patient states that for the past 2 weeks he has had worsening epigastric pain that radiates to the center of his chest. Patient has known history of gerd diagnosed on endoscopy about 2 years ago. Was taking pepcid daily for the past few years and was recently switched to ompeprazole by his doctor, around the same time of the worsening pain. Patient states he was worried about this constant pain and decided to come to the ED to be sure it was not cardiac in nature. Denies any palpitations or shortness of breath. Denies nausea, vomiting, or bloody stools. States the pain is burning in nature and is a 2/10 however at times is 4/10, states it is similiar to pain he has experienced in the past but more constat.  IN THE ED:  Temp 97.9 F, HR 74, /83, RR 15 , SpO2 97% on RA  S/P aspirin 325mg, protonix 40mg IVP  EKG: Normal sinus rhythm  Left axis deviation  Anteroseptal infarct (cited on or before 16-FEB-2020)  T wave abnormality, consider lateral ischemia  Abnormal ECG  Labs significant for platelets 131, Cl 112, BUN 28, Cr .85, troponin <.15  Imaging: CXR: No evidence of active chest disease  ---  HOSPITAL COURSE: This 66 y/o male presented with epigastric pain, was admitted to telemetry for further work up of pain. Cardiology was consulted and evaluated the patient. Cardiac enzymes were trended, resulted negative x3. Patient was tested for Covid-19 which also resulted negative. Patient given ASA 325mg PO x1 then resumed home dose of ASA 81mg PO qd. Home anti-hypertensives and statin were continued. Patient was given Protonix. Patient placed on a DASH/TLC diet which was tolerated well. Significant laboratory findings included thrombocytopenia (131 -> 125) for which the patient will need an outpatient evaluation. Patient showed improvement throughout hospitalization. Patient was seen and examined on day of discharge:    VITALS:   T(C): 36.7 (01-28-21 @ 05:10), Max: 36.9 (01-27-21 @ 22:30)  HR: 70 (01-28-21 @ 05:10) (65 - 74)  BP: 117/69 (01-28-21 @ 05:10) (104/54 - 133/83)  RR: 16 (01-28-21 @ 05:10) (15 - 16)  SpO2: 98% (01-28-21 @ 05:10) (97% - 98%)    PHYSICAL EXAM:    Patient is stable for discharge as per primary medical team and consultants. Patient was medically optimized for discharge with close outpatient follow up.    ---  CONSULTANTS:   Cardiology: Dr. Valdez   ---  TIME SPENT: 60 minutes

## 2021-01-28 NOTE — DISCHARGE NOTE PROVIDER - NSDCFUSCHEDAPPT_GEN_ALL_CORE_FT
TIFFANIE MARKS ; 03/03/2021 ; [facility]  TIFFANIE MARKS ; 04/07/2021 ; [facility]

## 2021-02-07 ENCOUNTER — EMERGENCY (EMERGENCY)
Facility: HOSPITAL | Age: 66
LOS: 1 days | Discharge: HOME | End: 2021-02-07
Attending: EMERGENCY MEDICINE | Admitting: EMERGENCY MEDICINE
Payer: COMMERCIAL

## 2021-02-07 VITALS
WEIGHT: 158.95 LBS | DIASTOLIC BLOOD PRESSURE: 81 MMHG | HEIGHT: 65 IN | SYSTOLIC BLOOD PRESSURE: 137 MMHG | HEART RATE: 84 BPM | RESPIRATION RATE: 16 BRPM | OXYGEN SATURATION: 97 % | TEMPERATURE: 98 F

## 2021-02-07 VITALS
OXYGEN SATURATION: 97 % | SYSTOLIC BLOOD PRESSURE: 118 MMHG | TEMPERATURE: 98 F | DIASTOLIC BLOOD PRESSURE: 70 MMHG | RESPIRATION RATE: 17 BRPM | HEART RATE: 77 BPM

## 2021-02-07 DIAGNOSIS — Z98.89 OTHER SPECIFIED POSTPROCEDURAL STATES: Chronic | ICD-10-CM

## 2021-02-07 LAB
ALBUMIN SERPL ELPH-MCNC: 3 G/DL — LOW (ref 3.3–5)
ALP SERPL-CCNC: 60 U/L — SIGNIFICANT CHANGE UP (ref 40–120)
ALT FLD-CCNC: 24 U/L — SIGNIFICANT CHANGE UP (ref 12–78)
ANION GAP SERPL CALC-SCNC: 7 MMOL/L — SIGNIFICANT CHANGE UP (ref 5–17)
APTT BLD: 28.6 SEC — SIGNIFICANT CHANGE UP (ref 27.5–35.5)
AST SERPL-CCNC: 19 U/L — SIGNIFICANT CHANGE UP (ref 15–37)
BASOPHILS # BLD AUTO: 0.04 K/UL — SIGNIFICANT CHANGE UP (ref 0–0.2)
BASOPHILS NFR BLD AUTO: 0.8 % — SIGNIFICANT CHANGE UP (ref 0–2)
BILIRUB SERPL-MCNC: 0.6 MG/DL — SIGNIFICANT CHANGE UP (ref 0.2–1.2)
BUN SERPL-MCNC: 34 MG/DL — HIGH (ref 7–23)
CALCIUM SERPL-MCNC: 8.3 MG/DL — LOW (ref 8.5–10.1)
CHLORIDE SERPL-SCNC: 112 MMOL/L — HIGH (ref 96–108)
CO2 SERPL-SCNC: 26 MMOL/L — SIGNIFICANT CHANGE UP (ref 22–31)
CREAT SERPL-MCNC: 1.2 MG/DL — SIGNIFICANT CHANGE UP (ref 0.5–1.3)
EOSINOPHIL # BLD AUTO: 0.15 K/UL — SIGNIFICANT CHANGE UP (ref 0–0.5)
EOSINOPHIL NFR BLD AUTO: 2.9 % — SIGNIFICANT CHANGE UP (ref 0–6)
GLUCOSE SERPL-MCNC: 85 MG/DL — SIGNIFICANT CHANGE UP (ref 70–99)
HCT VFR BLD CALC: 38.9 % — LOW (ref 39–50)
HGB BLD-MCNC: 13.2 G/DL — SIGNIFICANT CHANGE UP (ref 13–17)
IMM GRANULOCYTES NFR BLD AUTO: 0.4 % — SIGNIFICANT CHANGE UP (ref 0–1.5)
INR BLD: 1.09 RATIO — SIGNIFICANT CHANGE UP (ref 0.88–1.16)
LIDOCAIN IGE QN: 124 U/L — SIGNIFICANT CHANGE UP (ref 73–393)
LYMPHOCYTES # BLD AUTO: 1.05 K/UL — SIGNIFICANT CHANGE UP (ref 1–3.3)
LYMPHOCYTES # BLD AUTO: 20.1 % — SIGNIFICANT CHANGE UP (ref 13–44)
MCHC RBC-ENTMCNC: 30.2 PG — SIGNIFICANT CHANGE UP (ref 27–34)
MCHC RBC-ENTMCNC: 33.9 GM/DL — SIGNIFICANT CHANGE UP (ref 32–36)
MCV RBC AUTO: 89 FL — SIGNIFICANT CHANGE UP (ref 80–100)
MONOCYTES # BLD AUTO: 0.58 K/UL — SIGNIFICANT CHANGE UP (ref 0–0.9)
MONOCYTES NFR BLD AUTO: 11.1 % — SIGNIFICANT CHANGE UP (ref 2–14)
NEUTROPHILS # BLD AUTO: 3.39 K/UL — SIGNIFICANT CHANGE UP (ref 1.8–7.4)
NEUTROPHILS NFR BLD AUTO: 64.7 % — SIGNIFICANT CHANGE UP (ref 43–77)
NRBC # BLD: 0 /100 WBCS — SIGNIFICANT CHANGE UP (ref 0–0)
NT-PROBNP SERPL-SCNC: 672 PG/ML — HIGH (ref 0–125)
PLATELET # BLD AUTO: 118 K/UL — LOW (ref 150–400)
POTASSIUM SERPL-MCNC: 3.9 MMOL/L — SIGNIFICANT CHANGE UP (ref 3.5–5.3)
POTASSIUM SERPL-SCNC: 3.9 MMOL/L — SIGNIFICANT CHANGE UP (ref 3.5–5.3)
PROT SERPL-MCNC: 6.4 G/DL — SIGNIFICANT CHANGE UP (ref 6–8.3)
PROTHROM AB SERPL-ACNC: 12.7 SEC — SIGNIFICANT CHANGE UP (ref 10.6–13.6)
RBC # BLD: 4.37 M/UL — SIGNIFICANT CHANGE UP (ref 4.2–5.8)
RBC # FLD: 13.3 % — SIGNIFICANT CHANGE UP (ref 10.3–14.5)
SODIUM SERPL-SCNC: 145 MMOL/L — SIGNIFICANT CHANGE UP (ref 135–145)
TROPONIN I SERPL-MCNC: <.015 NG/ML — SIGNIFICANT CHANGE UP (ref 0.01–0.04)
TROPONIN I SERPL-MCNC: <.015 NG/ML — SIGNIFICANT CHANGE UP (ref 0.01–0.04)
WBC # BLD: 5.23 K/UL — SIGNIFICANT CHANGE UP (ref 3.8–10.5)
WBC # FLD AUTO: 5.23 K/UL — SIGNIFICANT CHANGE UP (ref 3.8–10.5)

## 2021-02-07 PROCEDURE — 84484 ASSAY OF TROPONIN QUANT: CPT

## 2021-02-07 PROCEDURE — 99284 EMERGENCY DEPT VISIT MOD MDM: CPT | Mod: 25

## 2021-02-07 PROCEDURE — 93010 ELECTROCARDIOGRAM REPORT: CPT | Mod: 59

## 2021-02-07 PROCEDURE — 99285 EMERGENCY DEPT VISIT HI MDM: CPT

## 2021-02-07 PROCEDURE — 36415 COLL VENOUS BLD VENIPUNCTURE: CPT

## 2021-02-07 PROCEDURE — 71045 X-RAY EXAM CHEST 1 VIEW: CPT

## 2021-02-07 PROCEDURE — 85730 THROMBOPLASTIN TIME PARTIAL: CPT

## 2021-02-07 PROCEDURE — 83690 ASSAY OF LIPASE: CPT

## 2021-02-07 PROCEDURE — 83880 ASSAY OF NATRIURETIC PEPTIDE: CPT

## 2021-02-07 PROCEDURE — 80053 COMPREHEN METABOLIC PANEL: CPT

## 2021-02-07 PROCEDURE — 71045 X-RAY EXAM CHEST 1 VIEW: CPT | Mod: 26

## 2021-02-07 PROCEDURE — 85610 PROTHROMBIN TIME: CPT

## 2021-02-07 PROCEDURE — 93005 ELECTROCARDIOGRAM TRACING: CPT

## 2021-02-07 PROCEDURE — 85025 COMPLETE CBC W/AUTO DIFF WBC: CPT

## 2021-02-07 RX ORDER — ASPIRIN/CALCIUM CARB/MAGNESIUM 324 MG
325 TABLET ORAL ONCE
Refills: 0 | Status: COMPLETED | OUTPATIENT
Start: 2021-02-07 | End: 2021-02-07

## 2021-02-07 RX ADMIN — Medication 325 MILLIGRAM(S): at 04:48

## 2021-02-07 NOTE — ED ADULT NURSE NOTE - CAS ELECT INFOMATION PROVIDED
gabapentin (NEURONTIN) 300 MG capsule      Last Written Prescription Date:  5/26/16  Last Fill Quantity: 270,   # refills: 11  Last Office Visit with [facility], [facility] or [facility] prescribing provider: 3/17/17  Future Office visit:       Routing refill request to provider for review/approval because:  Drug not on the [facility], [facility] or [facility] refill protocol or controlled substance     DC instructions

## 2021-02-07 NOTE — ED PROVIDER NOTE - CARE PROVIDERS DIRECT ADDRESSES
[email or web address]

## 2021-02-07 NOTE — CONSULT NOTE ADULT - SUBJECTIVE AND OBJECTIVE BOX
[facility] - Андрей Cardenas, José Miguel, Joshua, Magnus, Nubia Mcrae  Office Number: [phone]    Initial Consult Note    CHIEF COMPLAINT: Patient is a 65y old  Male who presents with a chief complaint of palpitations    HPI:  This is a 65 year old man with a history of CAD s/p AWSTEMI, PCI, a severe ICM s/p ICD and ICD generator change, provoked DVT previously on AC, VT s/p ablation, HTN who presents to the ED after he woke up in the middle of the night hearing his heart beat in his ear.  He had a very salty meal yesterday, and thinks this was the culprit.  No chest pain, dyspnea, PND, orthopnea, LE swelling, dizziness, or syncope.  No ICD discharges.  No fevers or chills. First set of blood work unrevealing.  He has anterior T wave changes on his EKG that are similar to prior.      PAST MEDICAL & SURGICAL HISTORY:  Hyperlipidemia    HTN (hypertension)    Stented coronary artery    MI (myocardial infarction)    History of tonsillectomy        SOCIAL HISTORY:  No tobacco, ethanol, or drug abuse.    FAMILY HISTORY:  FH: heart attack           MEDICATIONS  (STANDING):  Home medications reviewed in detail.           Allergies    No Known Allergies             REVIEW OF SYSTEMS:    All other review of systems is negative unless indicated above    VITAL SIGNS:   Vital Signs Last 24 Hrs  T(C): 36.4 (07 Feb 2021 03:36), Max: 36.4 (07 Feb 2021 03:36)  T(F): 97.5 (07 Feb 2021 03:36), Max: 97.5 (07 Feb 2021 03:36)  HR: 84 (07 Feb 2021 03:36) (84 - 84)  BP: 137/81 (07 Feb 2021 03:36) (137/81 - 137/81)  BP(mean): --  RR: 16 (07 Feb 2021 03:36) (16 - 16)  SpO2: 97% (07 Feb 2021 03:36) (97% - 97%)    I&O's Summary      On Exam:    Constitutional: NAD, alert and oriented x 3  Lungs:  Non-labored, breath sounds are clear bilaterally, No wheezing, rales or rhonchi  Cardiovascular: RRR.  S1 and S2 positive.  No murmurs, rubs, gallops or clicks  Gastrointestinal: Bowel Sounds present, soft, nontender.   Lymph: No peripheral edema. No cervical lymphadenopathy.  Neurological: Alert, no focal deficits  Skin: No rashes or ulcers   Psych:  Mood & affect appropriate.    LABS: All Labs Reviewed:                        13.2   5.23  )-----------( 118      ( 07 Feb 2021 04:07 )             38.9     07 Feb 2021 04:07    145    |  112    |  34     ----------------------------<  85     3.9     |  26     |  1.20     Ca    8.3        07 Feb 2021 04:07    TPro  6.4    /  Alb  3.0    /  TBili  0.6    /  DBili  x      /  AST  19     /  ALT  24     /  AlkPhos  60     07 Feb 2021 04:07    PT/INR - ( 07 Feb 2021 04:07 )   PT: 12.7 sec;   INR: 1.09 ratio         PTT - ( 07 Feb 2021 04:07 )  PTT:28.6 sec  CARDIAC MARKERS ( 07 Feb 2021 04:07 )  <.015 ng/mL / x     / x     / x     / x          Blood Culture:   02-07 @ 04:07  Pro Bnp 672        RADIOLOGY:    EKG:  NSR with anterior biphasic T waves unchanged from a prior tracing.

## 2021-02-07 NOTE — CONSULT NOTE ADULT - ASSESSMENT
This is a 65 year old man with a history of CAD s/p AWSTEMI, PCI, a severe ICM s/p ICD and ICD generator change, provoked DVT previously on AC, VT s/p ablation, HTN who presents to the ED after he woke up in the middle of the night hearing his heart beat in his ear.  NO evidence of ischemia or significant arrhythmia.  He is euvolemic.  He can get one more set of CE and be discharged home with follow up with his primary cardiologist.

## 2021-02-07 NOTE — ED PROVIDER NOTE - OBJECTIVE STATEMENT
65 year old male with past medical history of ischemic cardiomyopathy s/p ICD, CAD (AWMI s/p BMS to LAD in 2013), VT, s/p DCCV x 1. s/p LHC showing mild-moderate distal disease. s/p VT ablation 2/19/2020 sp ICD battery change on 2/2020 c/o hearing his heart sounds being very loud this morning, denies any cp or sob.  Here for evaluation.  Nonradiating, sudden onset, no medications taken for this

## 2021-02-07 NOTE — ED PROVIDER NOTE - CARE PROVIDER_API CALL
Kellie Bear S  INTERNAL MEDICINE  [address] [place] [address]  Phone: [phone]  Fax: [phone]  Follow Up Time: Navi Benavides)  Cardio [facility]  [address] [place] [address]  Phone: [phone]  Fax: [phone]  Follow Up Time:

## 2021-02-07 NOTE — ED PROVIDER NOTE - NSFOLLOWUPINSTRUCTIONS_ED_ALL_ED_FT
FOLLOW UP WITH YOUR CARDIOLOGIST or Dr Agudelo  DO NOT SKIP YOUR MEDICATIONS  RETURN FOR WORSENING SYMPTOMS OR ANY CONCERNS  Palpitations    A palpitation is the feeling that your heartbeat is irregular or is faster than normal. It may feel like your heart is fluttering or skipping a beat. They may be caused by many things, including smoking, caffeine, alcohol, stress, and certain medicines. Although most causes of palpitations are not serious, palpitations can be a sign of a serious medical problem. Avoid caffeine, alcohol, and tobacco products at home. Try to reduce stress and anxiety and make sure to get plenty of rest.     SEEK IMMEDIATE MEDICAL CARE IF YOU HAVE ANY OF THE FOLLOWING SYMPTOMS: chest pain, shortness of breath, severe headache, dizziness/lightheadedness, or fainting.

## 2021-02-07 NOTE — ED ADULT TRIAGE NOTE - CHIEF COMPLAINT QUOTE
" I feel like my heart is not beating right " since 2300 yesterday Denies chest pain, shortness of breath and palpitations

## 2021-02-07 NOTE — ED ADULT NURSE REASSESSMENT NOTE - NS ED NURSE REASSESS COMMENT FT1
Pt resting comfortably in bed, sitting up up. Denies chest pain, SOB, palpitations at this time. Repeat troponin sent per orders. Pt updated on POC. Awake alert oriented x 4, breathing even unlabored, abdomen soft non tender.

## 2021-02-07 NOTE — ED PROVIDER NOTE - PROGRESS NOTE DETAILS
according to cardiology : "This is a 65 year old man with a history of CAD s/p AWSTEMI, PCI, a severe ICM s/p ICD and ICD generator change, provoked DVT previously on AC, VT s/p ablation, HTN who presents to the ED after he woke up in the middle of the night hearing his heart beat in his ear.  NO evidence of ischemia or significant arrhythmia.  He is euvolemic.  He can get one more set of CE and be discharged home with follow up with his primary cardiologist. "

## 2021-02-07 NOTE — ED PROVIDER NOTE - PATIENT PORTAL LINK FT
You can access the FollowMyHealth Patient Portal offered by [facility] by registering at the following website: [email or web address]. By joining [employer]’s FollowMyHealth portal, you will also be able to view your health information using other applications (apps) compatible with our system.

## 2021-02-10 ENCOUNTER — OUTPATIENT (OUTPATIENT)
Dept: OUTPATIENT SERVICES | Facility: HOSPITAL | Age: 66
LOS: 1 days | End: 2021-02-10
Payer: COMMERCIAL

## 2021-02-10 ENCOUNTER — APPOINTMENT (OUTPATIENT)
Dept: CARDIOLOGY | Facility: HOSPITAL | Age: 66
End: 2021-02-10

## 2021-02-10 VITALS
OXYGEN SATURATION: 98 % | DIASTOLIC BLOOD PRESSURE: 70 MMHG | SYSTOLIC BLOOD PRESSURE: 108 MMHG | HEIGHT: 65 IN | HEART RATE: 73 BPM

## 2021-02-10 DIAGNOSIS — Z98.89 OTHER SPECIFIED POSTPROCEDURAL STATES: Chronic | ICD-10-CM

## 2021-02-10 DIAGNOSIS — K21.9 GASTRO-ESOPHAGEAL REFLUX DISEASE W/OUT ESOPHAGITIS: ICD-10-CM

## 2021-02-10 DIAGNOSIS — I47.2 VENTRICULAR TACHYCARDIA: ICD-10-CM

## 2021-02-10 DIAGNOSIS — I25.10 ATHEROSCLEROTIC HEART DISEASE OF NATIVE CORONARY ARTERY WITHOUT ANGINA PECTORIS: ICD-10-CM

## 2021-02-10 PROCEDURE — 93005 ELECTROCARDIOGRAM TRACING: CPT

## 2021-02-10 PROCEDURE — G0463: CPT

## 2021-02-11 PROBLEM — I47.2 PAROXYSMAL VENTRICULAR TACHYCARDIA: Status: ACTIVE | Noted: 2020-06-03

## 2021-02-11 NOTE — PHYSICAL EXAM
[General Appearance - Well Developed] : well developed [Normal Appearance] : normal appearance [Well Groomed] : well groomed [General Appearance - Well Nourished] : well nourished [No Deformities] : no deformities [General Appearance - In No Acute Distress] : no acute distress [Normal Conjunctiva] : the conjunctiva exhibited no abnormalities [Eyelids - No Xanthelasma] : the eyelids demonstrated no xanthelasmas [Normal Oral Mucosa] : normal oral mucosa [No Oral Pallor] : no oral pallor [No Oral Cyanosis] : no oral cyanosis [Respiration, Rhythm And Depth] : normal respiratory rhythm and effort [Exaggerated Use Of Accessory Muscles For Inspiration] : no accessory muscle use [Auscultation Breath Sounds / Voice Sounds] : lungs were clear to auscultation bilaterally [Heart Rate And Rhythm] : heart rate and rhythm were normal [Heart Sounds] : normal S1 and S2 [Murmurs] : no murmurs present [Abdomen Soft] : soft [Abdomen Tenderness] : non-tender [Abdomen Mass (___ Cm)] : no abdominal mass palpated [Gait - Sufficient For Exercise Testing] : the gait was sufficient for exercise testing [Abnormal Walk] : normal gait [Nail Clubbing] : no clubbing of the fingernails [Cyanosis, Localized] : no localized cyanosis [Petechial Hemorrhages (___cm)] : no petechial hemorrhages [Skin Color & Pigmentation] : normal skin color and pigmentation [] : no rash [No Venous Stasis] : no venous stasis [Skin Lesions] : no skin lesions [No Skin Ulcers] : no skin ulcer [No Xanthoma] : no  xanthoma was observed [Oriented To Time, Place, And Person] : oriented to person, place, and time [Affect] : the affect was normal [Mood] : the mood was normal [No Anxiety] : not feeling anxious

## 2021-02-12 DIAGNOSIS — I10 ESSENTIAL (PRIMARY) HYPERTENSION: ICD-10-CM

## 2021-02-12 DIAGNOSIS — I47.2 VENTRICULAR TACHYCARDIA: ICD-10-CM

## 2021-02-12 DIAGNOSIS — K21.9 GASTRO-ESOPHAGEAL REFLUX DISEASE WITHOUT ESOPHAGITIS: ICD-10-CM

## 2021-02-12 DIAGNOSIS — I42.9 CARDIOMYOPATHY, UNSPECIFIED: ICD-10-CM

## 2021-02-12 DIAGNOSIS — I49.9 CARDIAC ARRHYTHMIA, UNSPECIFIED: ICD-10-CM

## 2021-02-15 ENCOUNTER — EMERGENCY (EMERGENCY)
Facility: HOSPITAL | Age: 66
LOS: 1 days | Discharge: HOME | End: 2021-02-15
Attending: EMERGENCY MEDICINE | Admitting: EMERGENCY MEDICINE
Payer: COMMERCIAL

## 2021-02-15 VITALS
TEMPERATURE: 98 F | HEART RATE: 80 BPM | WEIGHT: 154.98 LBS | RESPIRATION RATE: 16 BRPM | SYSTOLIC BLOOD PRESSURE: 147 MMHG | DIASTOLIC BLOOD PRESSURE: 83 MMHG | HEIGHT: 65 IN | OXYGEN SATURATION: 99 %

## 2021-02-15 VITALS
RESPIRATION RATE: 16 BRPM | HEART RATE: 80 BPM | OXYGEN SATURATION: 97 % | SYSTOLIC BLOOD PRESSURE: 138 MMHG | TEMPERATURE: 98 F | DIASTOLIC BLOOD PRESSURE: 76 MMHG

## 2021-02-15 DIAGNOSIS — Z98.89 OTHER SPECIFIED POSTPROCEDURAL STATES: Chronic | ICD-10-CM

## 2021-02-15 PROCEDURE — 99284 EMERGENCY DEPT VISIT MOD MDM: CPT

## 2021-02-15 PROCEDURE — 93010 ELECTROCARDIOGRAM REPORT: CPT

## 2021-02-15 PROCEDURE — 93005 ELECTROCARDIOGRAM TRACING: CPT

## 2021-02-15 RX ORDER — OMEPRAZOLE 10 MG/1
1 CAPSULE, DELAYED RELEASE ORAL
Qty: 0 | Refills: 0 | DISCHARGE

## 2021-02-15 RX ORDER — ASPIRIN/CALCIUM CARB/MAGNESIUM 324 MG
1 TABLET ORAL
Qty: 0 | Refills: 0 | DISCHARGE

## 2021-02-15 RX ORDER — CHOLECALCIFEROL (VITAMIN D3) 125 MCG
1 CAPSULE ORAL
Qty: 0 | Refills: 0 | DISCHARGE

## 2021-02-15 RX ORDER — LISINOPRIL 2.5 MG/1
1 TABLET ORAL
Qty: 0 | Refills: 0 | DISCHARGE

## 2021-02-15 NOTE — ED PROVIDER NOTE - CARE PROVIDER_API CALL
Kellie Bear S  INTERNAL MEDICINE  [address] [place] [id number]  Phone: [phone]  Fax: [phone]  Follow Up Time:

## 2021-02-15 NOTE — ED ADULT NURSE NOTE - NSIMPLEMENTINTERV_GEN_ALL_ED
Implemented All Fall Risk Interventions:  [place] to call system. Call bell, personal items and telephone within reach. Instruct patient to call for assistance. Room bathroom lighting operational. Non-slip footwear when patient is off stretcher. Physically safe environment: no spills, clutter or unnecessary equipment. Stretcher in lowest position, wheels locked, appropriate side rails in place. Provide visual cue, wrist band, yellow gown, etc. Monitor gait and stability. Monitor for mental status changes and reorient to person, place, and time. Review medications for side effects contributing to fall risk. Reinforce activity limits and safety measures with patient and family.

## 2021-02-15 NOTE — ED ADULT TRIAGE NOTE - CHIEF COMPLAINT QUOTE
Patient complaining of high blood pressure at home which he took was 156/88 and stated it was high BP in /83 taking blood pressure medication

## 2021-02-15 NOTE — ED PROVIDER NOTE - PHYSICAL EXAMINATION
on my eval hr 70 bp 129/71  pt took his meds pta and feels fine is satisfied with plan of ekg and reassurance

## 2021-02-15 NOTE — ED PROVIDER NOTE - NSFOLLOWUPINSTRUCTIONS_ED_ALL_ED_FT
FOLLOW UP WITH DR ADKINS AS SCHEDULED  CONTINUE TO MONITOR YOUR VITAL SIGNS TWICE A DAY AS SAME TIMES EACH DAY  CONTINUE TO TAKE YOUR REGULARLY PRESCRIBED MEDICATIONS  RETURN IMMEDIATELY OR CALL 911 IF YOU HAVE WORSENING SYMPTOMS OR ANY CONCERNS  Chest Pain    Chest pain can be caused by many different conditions which may or may not be dangerous. Causes include heartburn, lung infections, heart attack, blood clot in lungs, skin infections, strain or damage to muscle, cartilage, or bones, etc. In addition to a history and physical examination, an electrocardiogram (ECG) or other lab tests may have been performed to determine the cause of your chest pain. Follow up with your primary care provider or with a cardiologist as instructed.     SEEK IMMEDIATE MEDICAL CARE IF YOU HAVE ANY OF THE FOLLOWING SYMPTOMS: worsening chest pain, coughing up blood, unexplained back/neck/jaw pain, severe abdominal pain, dizziness or lightheadedness, fainting, shortness of breath, sweaty or clammy skin, vomiting, or racing heart beat. These symptoms may represent a serious problem that is an emergency. Do not wait to see if the symptoms will go away. Get medical help right away. Call 911 and do not drive yourself to the hospital.

## 2021-02-15 NOTE — ED PROVIDER NOTE - CLINICAL SUMMARY MEDICAL DECISION MAKING FREE TEXT BOX
ekg ro changes in asymptomatic pt with normal vitals  provide reassurance and referral back to pmd with whom he has a previously scheduled appointment in 48 hours  continue meds   ekg is unchanged

## 2021-02-15 NOTE — ED PROVIDER NOTE - PATIENT PORTAL LINK FT
You can access the FollowMyHealth Patient Portal offered by [facility] by registering at the following website: [email or web address]. By joining [employer]’s FollowMyHealth portal, you will also be able to view your health information using other applications (apps) compatible with our system.

## 2021-02-20 NOTE — HISTORY OF PRESENT ILLNESS
[FreeTextEntry1] : 65M hx of hx of STEMI 12/2013 s/p BMS to proximal LAD, and ICM HFrEF ~20% s/p ICD and recent gen change 2/2020, VT s/p DCCV and VT ablation 2/2020, provoked RUE DVT on Eliquis, here for f/u.  Per last visit he went to his PCP for worsening Reflux symptoms who started PPI however also sent to ED for cardiac evaluation.  In ED, patient had negative troponins, EKG was unchanged from prior and he was sent home.  Pt states since initiation of PPI his reflux symptoms had resolved.  he has been checking his BP daily and valuse are mainly ~110/70s. Otherwise, he has no other complaints and feels well. Denies CP, dyspnea, HA, presyncope/syncope, LE edema, palpitations, ICD shocks, UE pain/swelling.

## 2021-02-20 NOTE — ASSESSMENT
[FreeTextEntry1] : 65M hx of hx of STEMI 12/2013 s/p BMS to proximal LAD, and ICM HFrEF ~20% s/p ICD and recent gen change 2/2020, VT s/p VT abalation 2/2020, provoked RUE DVT s/p Eliquis tx, here for RPV.\par \par #ICM - stage B\par -EF 20%, interval decrease\par -c/w ASA 81mg daily\par -euvolemic and w/o new cardiac symptoms\par -c/w Lisinopril 20mg daily, spironolactone 25mg daily\par -c/w Toprol XL 50mg daily\par \par #HTN\par -BP 110s/70s\par -Toprol, Lisinopril, spironolactone\par \par #VT\par -s/p VT ablation\par -short NSVT per recent interrogation\par -Toprol as above\par -c/w ongoing EP follow up \par \par #Right UE DVT\par -provoked in setting of recent ICD revision\par -s/p 6mos eliquis therapy\par \par #HLD\par -c/w statin\par \par RTC 6 mos\par \par Lisandro Blackwood MD\par \par

## 2021-02-20 NOTE — END OF VISIT
[] : Fellow [FreeTextEntry3] : 65-year old man with ischemic cardiomyopathy and chronic systolic  heart failure, being treated with standard of card therapy. Agree with management as outlined.

## 2021-04-07 ENCOUNTER — APPOINTMENT (OUTPATIENT)
Dept: ELECTROPHYSIOLOGY | Facility: CLINIC | Age: 66
End: 2021-04-07

## 2021-04-30 ENCOUNTER — EMERGENCY (EMERGENCY)
Facility: HOSPITAL | Age: 66
LOS: 1 days | Discharge: HOME | End: 2021-04-30
Attending: EMERGENCY MEDICINE | Admitting: STUDENT IN AN ORGANIZED HEALTH CARE EDUCATION/TRAINING PROGRAM
Payer: COMMERCIAL

## 2021-04-30 VITALS
RESPIRATION RATE: 18 BRPM | SYSTOLIC BLOOD PRESSURE: 117 MMHG | OXYGEN SATURATION: 98 % | WEIGHT: 158.95 LBS | DIASTOLIC BLOOD PRESSURE: 72 MMHG | HEART RATE: 79 BPM | TEMPERATURE: 99 F

## 2021-04-30 VITALS
HEART RATE: 67 BPM | RESPIRATION RATE: 18 BRPM | OXYGEN SATURATION: 98 % | DIASTOLIC BLOOD PRESSURE: 76 MMHG | TEMPERATURE: 98 F | SYSTOLIC BLOOD PRESSURE: 120 MMHG

## 2021-04-30 DIAGNOSIS — Z95.5 PRESENCE OF CORONARY ANGIOPLASTY IMPLANT AND GRAFT: Chronic | ICD-10-CM

## 2021-04-30 DIAGNOSIS — Z95.810 PRESENCE OF AUTOMATIC (IMPLANTABLE) CARDIAC DEFIBRILLATOR: Chronic | ICD-10-CM

## 2021-04-30 DIAGNOSIS — Z98.89 OTHER SPECIFIED POSTPROCEDURAL STATES: Chronic | ICD-10-CM

## 2021-04-30 LAB
ALBUMIN SERPL ELPH-MCNC: 3.3 G/DL — SIGNIFICANT CHANGE UP (ref 3.3–5)
ALP SERPL-CCNC: 61 U/L — SIGNIFICANT CHANGE UP (ref 40–120)
ALT FLD-CCNC: 19 U/L — SIGNIFICANT CHANGE UP (ref 12–78)
ANION GAP SERPL CALC-SCNC: 6 MMOL/L — SIGNIFICANT CHANGE UP (ref 5–17)
AST SERPL-CCNC: 15 U/L — SIGNIFICANT CHANGE UP (ref 15–37)
BASOPHILS # BLD AUTO: 0.02 K/UL — SIGNIFICANT CHANGE UP (ref 0–0.2)
BASOPHILS NFR BLD AUTO: 0.4 % — SIGNIFICANT CHANGE UP (ref 0–2)
BILIRUB SERPL-MCNC: 0.6 MG/DL — SIGNIFICANT CHANGE UP (ref 0.2–1.2)
BUN SERPL-MCNC: 36 MG/DL — HIGH (ref 7–23)
CALCIUM SERPL-MCNC: 8.2 MG/DL — LOW (ref 8.5–10.1)
CHLORIDE SERPL-SCNC: 113 MMOL/L — HIGH (ref 96–108)
CK MB BLD-MCNC: 2.3 % — SIGNIFICANT CHANGE UP (ref 0–3.5)
CK MB CFR SERPL CALC: 2.6 NG/ML — SIGNIFICANT CHANGE UP (ref 0–3.6)
CK SERPL-CCNC: 114 U/L — SIGNIFICANT CHANGE UP (ref 26–308)
CO2 SERPL-SCNC: 25 MMOL/L — SIGNIFICANT CHANGE UP (ref 22–31)
CREAT SERPL-MCNC: 1.1 MG/DL — SIGNIFICANT CHANGE UP (ref 0.5–1.3)
EOSINOPHIL # BLD AUTO: 0.16 K/UL — SIGNIFICANT CHANGE UP (ref 0–0.5)
EOSINOPHIL NFR BLD AUTO: 2.9 % — SIGNIFICANT CHANGE UP (ref 0–6)
GLUCOSE SERPL-MCNC: 107 MG/DL — HIGH (ref 70–99)
HCT VFR BLD CALC: 42.6 % — SIGNIFICANT CHANGE UP (ref 39–50)
HGB BLD-MCNC: 14.2 G/DL — SIGNIFICANT CHANGE UP (ref 13–17)
IMM GRANULOCYTES NFR BLD AUTO: 0.4 % — SIGNIFICANT CHANGE UP (ref 0–1.5)
LYMPHOCYTES # BLD AUTO: 1.04 K/UL — SIGNIFICANT CHANGE UP (ref 1–3.3)
LYMPHOCYTES # BLD AUTO: 19.1 % — SIGNIFICANT CHANGE UP (ref 13–44)
MCHC RBC-ENTMCNC: 29.8 PG — SIGNIFICANT CHANGE UP (ref 27–34)
MCHC RBC-ENTMCNC: 33.3 GM/DL — SIGNIFICANT CHANGE UP (ref 32–36)
MCV RBC AUTO: 89.3 FL — SIGNIFICANT CHANGE UP (ref 80–100)
MONOCYTES # BLD AUTO: 0.52 K/UL — SIGNIFICANT CHANGE UP (ref 0–0.9)
MONOCYTES NFR BLD AUTO: 9.6 % — SIGNIFICANT CHANGE UP (ref 2–14)
NEUTROPHILS # BLD AUTO: 3.68 K/UL — SIGNIFICANT CHANGE UP (ref 1.8–7.4)
NEUTROPHILS NFR BLD AUTO: 67.6 % — SIGNIFICANT CHANGE UP (ref 43–77)
NRBC # BLD: 0 /100 WBCS — SIGNIFICANT CHANGE UP (ref 0–0)
PLAT MORPH BLD: NORMAL — SIGNIFICANT CHANGE UP
PLATELET # BLD AUTO: 121 K/UL — LOW (ref 150–400)
POTASSIUM SERPL-MCNC: 4 MMOL/L — SIGNIFICANT CHANGE UP (ref 3.5–5.3)
POTASSIUM SERPL-SCNC: 4 MMOL/L — SIGNIFICANT CHANGE UP (ref 3.5–5.3)
PROT SERPL-MCNC: 6.5 G/DL — SIGNIFICANT CHANGE UP (ref 6–8.3)
RBC # BLD: 4.77 M/UL — SIGNIFICANT CHANGE UP (ref 4.2–5.8)
RBC # FLD: 13.2 % — SIGNIFICANT CHANGE UP (ref 10.3–14.5)
RBC BLD AUTO: NORMAL — SIGNIFICANT CHANGE UP
SODIUM SERPL-SCNC: 144 MMOL/L — SIGNIFICANT CHANGE UP (ref 135–145)
TROPONIN I SERPL-MCNC: <.015 NG/ML — SIGNIFICANT CHANGE UP (ref 0.01–0.04)
WBC # BLD: 5.44 K/UL — SIGNIFICANT CHANGE UP (ref 3.8–10.5)
WBC # FLD AUTO: 5.44 K/UL — SIGNIFICANT CHANGE UP (ref 3.8–10.5)

## 2021-04-30 PROCEDURE — 82553 CREATINE MB FRACTION: CPT

## 2021-04-30 PROCEDURE — 99284 EMERGENCY DEPT VISIT MOD MDM: CPT | Mod: 25

## 2021-04-30 PROCEDURE — 82550 ASSAY OF CK (CPK): CPT

## 2021-04-30 PROCEDURE — 99285 EMERGENCY DEPT VISIT HI MDM: CPT

## 2021-04-30 PROCEDURE — 36415 COLL VENOUS BLD VENIPUNCTURE: CPT

## 2021-04-30 PROCEDURE — 84484 ASSAY OF TROPONIN QUANT: CPT

## 2021-04-30 PROCEDURE — G1004: CPT

## 2021-04-30 PROCEDURE — 93005 ELECTROCARDIOGRAM TRACING: CPT

## 2021-04-30 PROCEDURE — 80053 COMPREHEN METABOLIC PANEL: CPT

## 2021-04-30 PROCEDURE — 93010 ELECTROCARDIOGRAM REPORT: CPT

## 2021-04-30 PROCEDURE — 70450 CT HEAD/BRAIN W/O DYE: CPT

## 2021-04-30 PROCEDURE — 96374 THER/PROPH/DIAG INJ IV PUSH: CPT

## 2021-04-30 PROCEDURE — 70450 CT HEAD/BRAIN W/O DYE: CPT | Mod: 26,MG

## 2021-04-30 PROCEDURE — 99284 EMERGENCY DEPT VISIT MOD MDM: CPT

## 2021-04-30 PROCEDURE — 85025 COMPLETE CBC W/AUTO DIFF WBC: CPT

## 2021-04-30 RX ORDER — MECLIZINE HCL 12.5 MG
1 TABLET ORAL
Qty: 15 | Refills: 0
Start: 2021-04-30 | End: 2021-05-04

## 2021-04-30 RX ORDER — SODIUM CHLORIDE 9 MG/ML
1000 INJECTION INTRAMUSCULAR; INTRAVENOUS; SUBCUTANEOUS ONCE
Refills: 0 | Status: COMPLETED | OUTPATIENT
Start: 2021-04-30 | End: 2021-04-30

## 2021-04-30 RX ORDER — ONDANSETRON 8 MG/1
4 TABLET, FILM COATED ORAL ONCE
Refills: 0 | Status: COMPLETED | OUTPATIENT
Start: 2021-04-30 | End: 2021-04-30

## 2021-04-30 RX ORDER — MECLIZINE HCL 12.5 MG
25 TABLET ORAL ONCE
Refills: 0 | Status: COMPLETED | OUTPATIENT
Start: 2021-04-30 | End: 2021-04-30

## 2021-04-30 RX ADMIN — ONDANSETRON 4 MILLIGRAM(S): 8 TABLET, FILM COATED ORAL at 06:53

## 2021-04-30 RX ADMIN — Medication 25 MILLIGRAM(S): at 06:53

## 2021-04-30 RX ADMIN — SODIUM CHLORIDE 1000 MILLILITER(S): 9 INJECTION INTRAMUSCULAR; INTRAVENOUS; SUBCUTANEOUS at 06:53

## 2021-04-30 NOTE — ED PROVIDER NOTE - PATIENT PORTAL LINK FT
You can access the FollowMyHealth Patient Portal offered by [facility] by registering at the following website: [email or web address]. By joining [employer]’s FollowMyHealth portal, you will also be able to view your health information using other applications (apps) compatible with our system.

## 2021-04-30 NOTE — ED PROVIDER NOTE - OBJECTIVE STATEMENT
64yo male bib ems with dizziness on and off since yesterday. pt states he woke up with sudden onset of dizziness, that resolved shortly after, pt states he went to work and then woke up this am with the same dizziness, vertiginous in nature, with nausea and vomiting, no blurry or double vision, no headache, no other complaints

## 2021-04-30 NOTE — ED PROVIDER NOTE - CARE PROVIDER_API CALL
Elieser Adler  NEUROLOGY  [address] [place] [address]  Phone: [phone]  Fax: [phone]  Follow Up Time: 7-10 Days

## 2021-04-30 NOTE — ED ADULT NURSE NOTE - PSH
History of coronary artery stent placement  x1  S/P implantation of automatic cardioverter/defibrillator (AICD)

## 2021-04-30 NOTE — ED ADULT TRIAGE NOTE - CHIEF COMPLAINT QUOTE
BIBEMS from home C/O dizziness. Patient was seen at his doctors office yesterday and had blood drawn, started feeling dizzy yesterday. Denies any CP, denies any other complaints.

## 2021-04-30 NOTE — ED ADULT NURSE NOTE - OBJECTIVE STATEMENT
Pt to ED c/c dizziness x 2 days, this morning almost fell down, states dizziness became worse with head movement. Pt states he is feeling better now.

## 2021-04-30 NOTE — CONSULT NOTE ADULT - ASSESSMENT
INCOMPLETE      64 yo Male patient with PMHx of STEMI 12/2013 s/p BMS to proximal LAD, and ICM HFrEF 20% s/p ICD and resent gen change 2/2020, VT s/p s/p DCCV and VT ablation 2/2020.     Ischemic cardiomyopathy   -s/p BMS 12/2013   - EKG shows NSR @ 66 bpm, LAD, non specific T waves abnormality   - Last echo on 06/2020 severe ventricular systolic disfunction EF 20%, moderate diastolic dysfunction stage II   - Takes at home lisinopril  20mg qd, spironolactone 25 qd, Toprolol XL 50mg qd and ASA 81 qd    HTN   -BP --  -Lisinopril  20mg qd, Toprolol XL 50mg qd     VT s/p ablation   - On Toprolol XL 50mg    HLD   - On statins     Right UE DVT  - provoked in the setting of resent ICD revision    - s/p 6 months Eliquis    66 yo Male patient with PMHx of STEMI 12/2013 s/p BMS to proximal LAD, and ICM HFrEF 20% s/p ICD and resent gen change 2/2020, VT s/p s/p DCCV and VT ablation 2/2020.     Vertigo, now resolved   - Patient with history of vertigo   -s/p meclizine     Ischemic cardiomyopathy   -s/p BMS 12/2013   - EKG shows NSR @ 66 bpm, LAD, non specific T waves abnormality, no changes from previous on 02/21   - Last echo on 06/2020 severe ventricular systolic disfunction EF 20%, moderate diastolic dysfunction stage II   - Takes at home lisinopril  20mg qd, spironolactone 25 qd, Toprolol XL 50mg qd and ASA 81 qd  - Would recommend orthostatics before dc    HTN   -/76  -Lisinopril  20mg qd, Toprolol XL 50mg qd     VT s/p ablation   - On Toprolol XL 50mg    HLD   - On statins     Right UE DVT  - provoked in the setting of resent ICD revision    - s/p 6 months Eliquis    66 yo Male patient with PMHx of STEMI 12/2013 s/p BMS to proximal LAD, and ICM HFrEF 20% s/p ICD and resent gen change 2/2020, VT s/p s/p DCCV and VT ablation 2/2020.     Vertigo, now resolved   - Patient with history of vertigo   - s/p meclizine     Ischemic cardiomyopathy   - s/p BMS 12/2013   - EKG shows NSR @ 66 bpm, LAD, non specific T waves abnormality, no changes from previous on 02/21   - Last echo on 06/2020 severe ventricular systolic disfunction EF 20%, moderate diastolic dysfunction stage II   - Takes at home lisinopril  20mg qd, spironolactone 25 qd, Toprolol XL 50mg qd and ASA 81 qd  - Would recommend check orthostatics before dc    HTN   - /76  - Lisinopril  20mg qd, Toprol XL 50mg qd     VT s/p ablation   - On Toprol XL 50mg    HLD   - On statins     Right UE DVT  - provoked in the setting of ICD revision    - s/p 6 months Eliquis     - no cardiac contraindication to dc home

## 2021-04-30 NOTE — CONSULT NOTE ADULT - SUBJECTIVE AND OBJECTIVE BOX
Incomplete     [facility]         Андрей Cardenas, José Miguel, Joshua, Magnus, Nubia Mcrae        [phone] (office)    Reason for Consult: Dizziness and abnormal EKG    Interval Cardio: 66 yo Male patient with PMHx of  HTN, CAD (s/p stent and AICD) p/w dizziness--------------              PAST MEDICAL & SURGICAL HISTORY:  CAD (coronary artery disease)  Hypertension  MI (myocardial infarction)  S/P implantation of automatic cardioverter/defibrillator (AICD)  History of coronary artery stent placement  x1        SOCIAL HISTORY: No active tobacco, alcohol or illicit drug use    FAMILY HISTORY:      Home Medications:      MEDICATIONS  (STANDING):    MEDICATIONS  (PRN):      Allergies    No Known Allergies    Intolerances        REVIEW OF SYSTEMS: Negative except as per HPI.    VITAL SIGNS:   Vital Signs Last 24 Hrs  T(C): 36.4 (30 Apr 2021 07:15), Max: 37 (30 Apr 2021 06:24)  T(F): 97.6 (30 Apr 2021 07:15), Max: 98.6 (30 Apr 2021 06:24)  HR: 67 (30 Apr 2021 07:15) (67 - 79)  BP: 107/68 (30 Apr 2021 07:15) (107/68 - 117/72)  BP(mean): --  RR: 17 (30 Apr 2021 07:15) (17 - 18)  SpO2: 100% (30 Apr 2021 07:15) (98% - 100%)    I&O's Summary      PHYSICAL EXAM:  Constitutional: NAD, well-developed  HEENT NC/AT, moist mucous membranes  Pulmonary: Non-labored, breath sounds are clear bilaterally, no wheezing, rales or rhonchi  Cardiovascular: +S1, S2, RRR, no murmur  Gastrointestinal: Soft, nontender, nondistended, normoactive bowel sounds  Extremities: No peripheral edema   Neurological: Alert, strength and sensitivity are grossly intact  Skin: No obvious lesions/rashes  Psych: Mood & affect appropriate    LABS: All Labs Reviewed:            CARDIAC MARKERS ( 30 Apr 2021 06:50 )  <.015 ng/mL / x     / x     / x     / x          Blood Culture:         EKG:    RADIOLOGY:    CXR:   [facility]         Андрей Cardenas, José Miguel, Joshua, Magnus, Thor, Nubia        [phone] (office)    Reason for Consult: Dizziness and abnormal EKG    Interval Cardio: 66 yo Male patient with PMHx of STEMI 12/201 s/p BMS to proximal LAD, and ICM HFrEF 20% s/p ICD and resent gen change 2/2020, VT s/p s/p DCCV and VT ablation 2/2020, DVT on Eliquis.       Cardiologist: Dr. Dereck Duran (last visit 02/2021)  EKG shows NSR @ 66 bpm, LAD, non specific T waves abnormality   Last echo on 06/2020 severe ventricular systolic disfunction EF 20%, moderate diastolic dysfunction stage II   ICD medtronic [id number] implanted on 2/21/2020, interrogate june/2020               PAST MEDICAL & SURGICAL HISTORY:  CAD (coronary artery disease)  Hypertension  MI (myocardial infarction)  S/P implantation of automatic cardioverter/defibrillator (AICD)  History of coronary artery stent placement  x1    SOCIAL HISTORY: No active tobacco, alcohol or illicit drug use    FAMILY HISTORY:      Home Medications:      MEDICATIONS  (STANDING):    MEDICATIONS  (PRN):      Allergies    No Known Allergies    Intolerances        REVIEW OF SYSTEMS: Negative except as per HPI.    VITAL SIGNS:   Vital Signs Last 24 Hrs  T(C): 36.4 (30 Apr 2021 07:15), Max: 37 (30 Apr 2021 06:24)  T(F): 97.6 (30 Apr 2021 07:15), Max: 98.6 (30 Apr 2021 06:24)  HR: 67 (30 Apr 2021 07:15) (67 - 79)  BP: 107/68 (30 Apr 2021 07:15) (107/68 - 117/72)  BP(mean): --  RR: 17 (30 Apr 2021 07:15) (17 - 18)  SpO2: 100% (30 Apr 2021 07:15) (98% - 100%)    I&O's Summary      PHYSICAL EXAM:  Constitutional: NAD, well-developed  HEENT NC/AT, moist mucous membranes  Pulmonary: Non-labored, breath sounds are clear bilaterally, no wheezing, rales or rhonchi  Cardiovascular: +S1, S2, RRR, no murmur  Gastrointestinal: Soft, nontender, nondistended, normoactive bowel sounds  Extremities: No peripheral edema   Neurological: Alert, strength and sensitivity are grossly intact  Skin: No obvious lesions/rashes  Psych: Mood & affect appropriate    LABS: All Labs Reviewed:            CARDIAC MARKERS ( 30 Apr 2021 06:50 )  <.015 ng/mL / x     / x     / x     / x          Blood Culture:         EKG:    RADIOLOGY:    CXR:       [facility]         Андрей Cardenas, José Miguel, Joshua, Magnus, Thor, Nubia        [phone] (office)    Reason for Consult: Dizziness and abnormal EKG    Interval Cardio: 64 yo Male patient with PMHx of STEMI 12/201 s/p BMS to proximal LAD, and ICM HFrEF 20% s/p ICD and resent gen change 2/2020, VT s/p s/p DCCV and VT ablation 2/2020, DVT on Eliquis, vertigo. Patient states this morning around 5 am, he was lying on the bed, moved his head and felt that the bed was spinning. He denies chest pain, palpitations, LOC.       Cardiologist: Dr. Dereck Duran (last visit 02/2021)  EKG shows NSR @ 66 bpm, LAD, non specific T waves abnormality   Last echo on 06/2020 severe ventricular systolic disfunction EF 20%, moderate diastolic dysfunction stage II   ICD medtronic [id number] implanted on 2/21/2020, interrogate june/2020               PAST MEDICAL & SURGICAL HISTORY:  CAD (coronary artery disease)  Hypertension  MI (myocardial infarction)  S/P implantation of automatic cardioverter/defibrillator (AICD)  History of coronary artery stent placement  x1    SOCIAL HISTORY: No active tobacco, alcohol or illicit drug use    FAMILY HISTORY:      Home Medications:      MEDICATIONS  (STANDING):    MEDICATIONS  (PRN):      Allergies    No Known Allergies    Intolerances        REVIEW OF SYSTEMS: Negative except as per HPI.    VITAL SIGNS:   Vital Signs Last 24 Hrs  T(C): 36.4 (30 Apr 2021 07:15), Max: 37 (30 Apr 2021 06:24)  T(F): 97.6 (30 Apr 2021 07:15), Max: 98.6 (30 Apr 2021 06:24)  HR: 67 (30 Apr 2021 07:15) (67 - 79)  BP: 107/68 (30 Apr 2021 07:15) (107/68 - 117/72)  BP(mean): --  RR: 17 (30 Apr 2021 07:15) (17 - 18)  SpO2: 100% (30 Apr 2021 07:15) (98% - 100%)    I&O's Summary      PHYSICAL EXAM:  Constitutional: NAD, well-developed  HEENT NC/AT, moist mucous membranes  Pulmonary: Non-labored, breath sounds are clear bilaterally, no wheezing, rales or rhonchi  Cardiovascular: +S1, S2, RRR, no murmur  Gastrointestinal: Soft, nontender, nondistended, normoactive bowel sounds  Extremities: No peripheral edema   Neurological: Alert, strength and sensitivity are grossly intact  Skin: No obvious lesions/rashes  Psych: Mood & affect appropriate    LABS: All Labs Reviewed:            CARDIAC MARKERS ( 30 Apr 2021 06:50 )  <.015 ng/mL / x     / x     / x     / x          Blood Culture:         EKG:    RADIOLOGY:    CXR:

## 2021-04-30 NOTE — ED PROVIDER NOTE - NSFOLLOWUPINSTRUCTIONS_ED_ALL_ED_FT
1. TAKE ALL MEDICATIONS AS DIRECTED.    2. Take meclizine every 8 hours if needed for dizziness   3. FOLLOW UP WITH YOUR PRIMARY DOCTOR WITHIN 5 DAYS AS DIRECTED.  4. IF YOU HAD LABS OR IMAGING DONE, YOU WERE GIVEN COPIES OF ALL LABS AND/OR IMAGING RESULTS FROM YOUR ER VISIT--PLEASE TAKE THEM WITH YOU TO YOUR FOLLOW UP APPOINTMENTS.  5. IF NEEDED, CALL PATIENT ACCESS SERVICES AT [phone] TO FIND A PRIMARY CARE PHYSICIAN.  OR CALL [phone] TO MAKE AN APPOINTMENT WITH THE CLINIC.  6. RETURN TO THE ER FOR ANY WORSENING SYMPTOMS OR CONCERNS.    Follow up with neurology     Benign Paroxysmal Positional Vertigo    WHAT YOU NEED TO KNOW:    BPPV is an inner ear condition that causes you to suddenly feel dizzy. Benign means it is not serious or life-threatening. BPPV is caused by a problem with the nerves and structure of your inner ear. BPPV happens when small pieces of calcium break loose and lump together in one of your inner ear canals. Ear Anatomy         DISCHARGE INSTRUCTIONS:    Return to the emergency department if:     You fall during a BPPV episode and are injured.      You have a severe headache that does not go away.      You have new changes in your vision or feel weak or confused.      You have problems hearing, or you have ringing or buzzing in your ears.    Contact your healthcare provider if:     Your BPPV symptoms do not go away or they return.      You have problems with your balance, or you are falling often.      You have new or increased nausea or vomiting with vertigo.      You feel anxious or depressed and do not want to leave your home.      You have questions or concerns about your condition or care.    Medicines:     Medicines may be recommended or prescribed to treat dizziness or nausea.      Take your medicine as directed. Contact your healthcare provider if you think your medicine is not helping or if you have side effects. Tell him of her if you are allergic to any medicine. Keep a list of the medicines, vitamins, and herbs you take. Include the amounts, and when and why you take them. Bring the list or the pill bottles to follow-up visits. Carry your medicine list with you in case of an emergency.    Prevent your symptoms:     Try to avoid sudden head movements. Stand up and lie down slowly.       Raise and support your head when you lie down. Place pillows under your upper back and head or rest in a recliner.       Change your position often when you are lying down. Try not to lie with your head on the same side for long periods of time. Roll over slowly.       Wear protective gear when you ride a bike or play sports. A helmet helps protect your head from injury.    Follow up with your healthcare provider as directed: You may need to return in 1 month to check the progress of your treatment. Write down your questions so you remember to ask them during your visits.

## 2021-04-30 NOTE — CONSULT NOTE ADULT - ATTENDING COMMENTS
69
Seen/examined. agree with above.  Dizziness which description sounds like vertigo  ekg abnormal though unchanged from prior ekgs  doubt arrythmia based on description and improvement with meclizine  can send home with outpt follow up.

## 2021-04-30 NOTE — ED ADULT NURSE NOTE - NSFALLRSKASSESSTYPE_ED_ALL_ED
"-- DO NOT REPLY / DO NOT REPLY ALL --  -- Message is from the [employer]--    COVID-19 Universal Screening Responses:    Shortness of Breath or Difficulty Completing a Sentence: NO  Fever, Cough or Other Respiratory: NO  Close Contact, Exposure or Travel to Identified Countries: NO    General Patient Message      Reason for Call: patient stated that she has asthma and cannot work in this COVID-19 patient needs a letter from doctor to get an excuse from work. Please can someone fax over this document to her employer. Patient does not have an assigned PCP never seen by one either. Only seen at [facility]. Patient needs this document faxed in before 8pm tonight      Alternative phone number: Patient number [phone] or employer fax number  [phone]    Turnaround time given to caller: ""This message will be sent to [facility] Provider's name]. The clinical team will fulfill your request as soon as they review your message when the office opens on Monday (or after the holiday). \""      " Initial (On Arrival)

## 2021-04-30 NOTE — ED ADULT TRIAGE NOTE - TEMPERATURE IN CELSIUS (DEGREES C)
37 [NI] : Genitourinary  [Nl] : Endocrine [Wgt Gain (___ Kg)] : recent [unfilled] kg weight gain [de-identified] : h/o admissions for possible kidney stones and penile discharge.

## 2021-04-30 NOTE — ED PROVIDER NOTE - PROGRESS NOTE DETAILS
pt feeling improved, ambulating without difficulty pending cards consult seen by cards, no cardiac concerns at this time, ekg unchanged from prior

## 2021-05-16 NOTE — ED PROVIDER NOTE - PHYSICAL EXAMINATION
Gen: Alert, NAD  Head/eyes: NC/AT, PERRL, EOMI, normal lids/conjunctiva, no scleral icterus  ENT: airway patent  Neck: supple, no tenderness/meningismus/JVD, Trachea midline  Pulm/lung: Bilateral clear BS, normal resp effort, no wheeze/stridor/retractions  CV/heart: RRR, no M/R/G, +2 dist pulses (radial, pedal DP/PT, popliteal)  GI/Abd: soft, NT/ND, +BS, no guarding/rebound tenderness  Musculoskeletal: no edema/erythema/cyanosis, FROM in all extremities, no C/T/L spine ttp  Skin: no rash, no vesicles, no petechaie, no ecchymosis, no swelling  Neuro: AAOx3, CN 2-12 intact, normal sensation, 5/5 motor strength in all extremities, normal gait, no dysmetria No

## 2021-05-21 PROBLEM — I21.9 ACUTE MYOCARDIAL INFARCTION, UNSPECIFIED: Chronic | Status: ACTIVE | Noted: 2021-04-30

## 2021-05-21 PROBLEM — I25.10 ATHEROSCLEROTIC HEART DISEASE OF NATIVE CORONARY ARTERY WITHOUT ANGINA PECTORIS: Chronic | Status: ACTIVE | Noted: 2021-04-30

## 2021-05-21 PROBLEM — I10 ESSENTIAL (PRIMARY) HYPERTENSION: Chronic | Status: ACTIVE | Noted: 2021-04-30

## 2021-06-02 ENCOUNTER — APPOINTMENT (OUTPATIENT)
Dept: ELECTROPHYSIOLOGY | Facility: CLINIC | Age: 66
End: 2021-06-02
Payer: MEDICARE

## 2021-06-02 ENCOUNTER — NON-APPOINTMENT (OUTPATIENT)
Age: 66
End: 2021-06-02

## 2021-06-02 VITALS
DIASTOLIC BLOOD PRESSURE: 76 MMHG | WEIGHT: 160 LBS | OXYGEN SATURATION: 98 % | BODY MASS INDEX: 26.66 KG/M2 | HEART RATE: 78 BPM | SYSTOLIC BLOOD PRESSURE: 128 MMHG | HEIGHT: 65 IN

## 2021-06-02 PROCEDURE — 93282 PRGRMG EVAL IMPLANTABLE DFB: CPT

## 2021-08-11 ENCOUNTER — APPOINTMENT (OUTPATIENT)
Dept: CARDIOLOGY | Facility: HOSPITAL | Age: 66
End: 2021-08-11

## 2021-08-11 ENCOUNTER — OUTPATIENT (OUTPATIENT)
Dept: OUTPATIENT SERVICES | Facility: HOSPITAL | Age: 66
LOS: 1 days | End: 2021-08-11
Payer: COMMERCIAL

## 2021-08-11 VITALS
SYSTOLIC BLOOD PRESSURE: 110 MMHG | OXYGEN SATURATION: 98 % | HEIGHT: 65 IN | HEART RATE: 68 BPM | BODY MASS INDEX: 26.66 KG/M2 | DIASTOLIC BLOOD PRESSURE: 72 MMHG | WEIGHT: 160 LBS

## 2021-08-11 DIAGNOSIS — I25.10 ATHEROSCLEROTIC HEART DISEASE OF NATIVE CORONARY ARTERY WITHOUT ANGINA PECTORIS: ICD-10-CM

## 2021-08-11 DIAGNOSIS — Z95.810 PRESENCE OF AUTOMATIC (IMPLANTABLE) CARDIAC DEFIBRILLATOR: Chronic | ICD-10-CM

## 2021-08-11 DIAGNOSIS — Z98.89 OTHER SPECIFIED POSTPROCEDURAL STATES: Chronic | ICD-10-CM

## 2021-08-11 DIAGNOSIS — Z95.5 PRESENCE OF CORONARY ANGIOPLASTY IMPLANT AND GRAFT: Chronic | ICD-10-CM

## 2021-08-11 PROCEDURE — G0463: CPT

## 2021-08-11 PROCEDURE — 93005 ELECTROCARDIOGRAM TRACING: CPT

## 2021-08-13 NOTE — REASON FOR VISIT
[Cardiac Failure] : cardiac failure [Structural Heart and Valve Disease] : structural heart and valve disease

## 2021-08-15 NOTE — PHYSICAL EXAM
[Well Developed] : well developed [Well Nourished] : well nourished [No Acute Distress] : no acute distress [Normal Conjunctiva] : normal conjunctiva [Normal Venous Pressure] : normal venous pressure [No Carotid Bruit] : no carotid bruit [Normal S1, S2] : normal S1, S2 [No Rub] : no rub [No Gallop] : no gallop [Clear Lung Fields] : clear lung fields [Good Air Entry] : good air entry [No Respiratory Distress] : no respiratory distress  [Soft] : abdomen soft [Non Tender] : non-tender [No Masses/organomegaly] : no masses/organomegaly [Normal Bowel Sounds] : normal bowel sounds [Normal Gait] : normal gait [No Edema] : no edema [No Cyanosis] : no cyanosis [No Clubbing] : no clubbing [No Varicosities] : no varicosities [No Rash] : no rash [No Skin Lesions] : no skin lesions [Moves all extremities] : moves all extremities [No Focal Deficits] : no focal deficits [Normal Speech] : normal speech [Alert and Oriented] : alert and oriented [Normal memory] : normal memory [de-identified] : 2/6 systolic murmur

## 2021-08-15 NOTE — HISTORY OF PRESENT ILLNESS
[FreeTextEntry1] : 65M hx of hx of STEMI 12/2013 s/p BMS to proximal LAD, and ICM HFrEF ~20% s/p ICD and recent gen change 2/2020, VT s/p DCCV and VT ablation 2/2020, provoked RUE DVT on Eliquis, here for f/u. Since last visit, pt has been feeling well without any cardiac complaints.  Has been compliant with his medications.  Now working full time at his [employer] job, ambulating up flights of stairs without issue. denies CP, palpitations, dyspnea presyncope/syncope, LE edema, palpitations, ICD shocks, UE pain/swelling.  Interrogation reviewed from 6/2021, short bursts NSVT, otherwise unremarkable.\par  \par

## 2021-08-15 NOTE — ASSESSMENT
[FreeTextEntry1] : 65M hx of hx of STEMI 12/2013 s/p BMS to proximal LAD, and ICM HFrEF ~20% s/p ICD and recent gen change 2/2020, VT s/p VT abalation 2/2020, provoked RUE DVT s/p Eliquis tx, here for RPV.\par \par #ICM - stage B\par -EF 20%, mod MR (2020)\par -euvolemic and w/o new cardiac symptoms\par -c/w ASA 81mg daily\par -trial of Entresto 24-26mg BID; will work on financial aspect w/ Patient and [employer] as needed: explained 36 hour washout period with Lisinopril and need for BMP 1 week after initiation\par -hold on Lisinopril 20mg daily for now\par -c/w spironolactone 25mg daily\par -c/w Toprol XL 50mg daily\par \par #HTN\par -BP 110s/70s\par -Toprol, entresto/lisinopril, spironolactone\par \par #VT\par -s/p VT ablation\par -short NSVT per recent interrogation\par -Toprol as above\par -c/w ongoing EP follow up \par \par #Right UE DVT\par -provoked in setting of recent ICD revision\par -s/p 6mos eliquis therapy\par \par #HLD\par -c/w statin\par \par RTC 4 mos\par \par Lisandro Christiansen MD

## 2021-08-16 DIAGNOSIS — I10 ESSENTIAL (PRIMARY) HYPERTENSION: ICD-10-CM

## 2021-08-16 DIAGNOSIS — I50.20 UNSPECIFIED SYSTOLIC (CONGESTIVE) HEART FAILURE: ICD-10-CM

## 2021-08-16 DIAGNOSIS — I49.9 CARDIAC ARRHYTHMIA, UNSPECIFIED: ICD-10-CM

## 2021-08-16 DIAGNOSIS — I42.9 CARDIOMYOPATHY, UNSPECIFIED: ICD-10-CM

## 2021-08-19 LAB
ANION GAP SERPL CALC-SCNC: 10 MMOL/L
BUN SERPL-MCNC: 34 MG/DL
CALCIUM SERPL-MCNC: 9.1 MG/DL
CHLORIDE SERPL-SCNC: 105 MMOL/L
CHOLEST SERPL-MCNC: 101 MG/DL
CO2 SERPL-SCNC: 23 MMOL/L
CREAT SERPL-MCNC: 1.16 MG/DL
ESTIMATED AVERAGE GLUCOSE: 114 MG/DL
GLUCOSE SERPL-MCNC: 95 MG/DL
HBA1C MFR BLD HPLC: 5.6 %
HDLC SERPL-MCNC: 45 MG/DL
LDLC SERPL CALC-MCNC: 40 MG/DL
NONHDLC SERPL-MCNC: 56 MG/DL
POTASSIUM SERPL-SCNC: 4.4 MMOL/L
SODIUM SERPL-SCNC: 138 MMOL/L
TRIGL SERPL-MCNC: 77 MG/DL

## 2021-09-22 ENCOUNTER — FORM ENCOUNTER (OUTPATIENT)
Age: 66
End: 2021-09-22

## 2021-11-02 ENCOUNTER — TRANSCRIPTION ENCOUNTER (OUTPATIENT)
Age: 66
End: 2021-11-02

## 2021-11-10 ENCOUNTER — APPOINTMENT (OUTPATIENT)
Dept: CARDIOLOGY | Facility: HOSPITAL | Age: 66
End: 2021-11-10

## 2021-11-11 ENCOUNTER — EMERGENCY (EMERGENCY)
Facility: HOSPITAL | Age: 66
LOS: 1 days | Discharge: HOME | End: 2021-11-11
Attending: EMERGENCY MEDICINE | Admitting: EMERGENCY MEDICINE
Payer: COMMERCIAL

## 2021-11-11 VITALS
WEIGHT: 160.06 LBS | HEART RATE: 79 BPM | OXYGEN SATURATION: 96 % | DIASTOLIC BLOOD PRESSURE: 83 MMHG | TEMPERATURE: 98 F | SYSTOLIC BLOOD PRESSURE: 136 MMHG | RESPIRATION RATE: 18 BRPM | HEIGHT: 65 IN

## 2021-11-11 VITALS
SYSTOLIC BLOOD PRESSURE: 110 MMHG | OXYGEN SATURATION: 97 % | RESPIRATION RATE: 16 BRPM | DIASTOLIC BLOOD PRESSURE: 68 MMHG | HEART RATE: 68 BPM | TEMPERATURE: 98 F

## 2021-11-11 DIAGNOSIS — Z98.89 OTHER SPECIFIED POSTPROCEDURAL STATES: Chronic | ICD-10-CM

## 2021-11-11 DIAGNOSIS — Z95.5 PRESENCE OF CORONARY ANGIOPLASTY IMPLANT AND GRAFT: Chronic | ICD-10-CM

## 2021-11-11 DIAGNOSIS — Z95.810 PRESENCE OF AUTOMATIC (IMPLANTABLE) CARDIAC DEFIBRILLATOR: Chronic | ICD-10-CM

## 2021-11-11 LAB
ALBUMIN SERPL ELPH-MCNC: 2.7 G/DL — LOW (ref 3.3–5)
ALP SERPL-CCNC: 64 U/L — SIGNIFICANT CHANGE UP (ref 40–120)
ALT FLD-CCNC: 26 U/L — SIGNIFICANT CHANGE UP (ref 12–78)
ANION GAP SERPL CALC-SCNC: 7 MMOL/L — SIGNIFICANT CHANGE UP (ref 5–17)
APTT BLD: 26.6 SEC — LOW (ref 27.5–35.5)
AST SERPL-CCNC: 16 U/L — SIGNIFICANT CHANGE UP (ref 15–37)
BASOPHILS # BLD AUTO: 0.02 K/UL — SIGNIFICANT CHANGE UP (ref 0–0.2)
BASOPHILS NFR BLD AUTO: 0.3 % — SIGNIFICANT CHANGE UP (ref 0–2)
BILIRUB SERPL-MCNC: 0.6 MG/DL — SIGNIFICANT CHANGE UP (ref 0.2–1.2)
BUN SERPL-MCNC: 34 MG/DL — HIGH (ref 7–23)
CALCIUM SERPL-MCNC: 8.6 MG/DL — SIGNIFICANT CHANGE UP (ref 8.5–10.1)
CHLORIDE SERPL-SCNC: 110 MMOL/L — HIGH (ref 96–108)
CK MB CFR SERPL CALC: 2 NG/ML — SIGNIFICANT CHANGE UP (ref 0–3.6)
CO2 SERPL-SCNC: 24 MMOL/L — SIGNIFICANT CHANGE UP (ref 22–31)
CREAT SERPL-MCNC: 1.1 MG/DL — SIGNIFICANT CHANGE UP (ref 0.5–1.3)
EOSINOPHIL # BLD AUTO: 0.07 K/UL — SIGNIFICANT CHANGE UP (ref 0–0.5)
EOSINOPHIL NFR BLD AUTO: 1 % — SIGNIFICANT CHANGE UP (ref 0–6)
GLUCOSE SERPL-MCNC: 157 MG/DL — HIGH (ref 70–99)
HCT VFR BLD CALC: 44.1 % — SIGNIFICANT CHANGE UP (ref 39–50)
HGB BLD-MCNC: 14.1 G/DL — SIGNIFICANT CHANGE UP (ref 13–17)
IMM GRANULOCYTES NFR BLD AUTO: 0.4 % — SIGNIFICANT CHANGE UP (ref 0–1.5)
INR BLD: 1.18 RATIO — HIGH (ref 0.88–1.16)
LYMPHOCYTES # BLD AUTO: 0.72 K/UL — LOW (ref 1–3.3)
LYMPHOCYTES # BLD AUTO: 10.4 % — LOW (ref 13–44)
MAGNESIUM SERPL-MCNC: 2.3 MG/DL — SIGNIFICANT CHANGE UP (ref 1.6–2.6)
MCHC RBC-ENTMCNC: 27.9 PG — SIGNIFICANT CHANGE UP (ref 27–34)
MCHC RBC-ENTMCNC: 32 GM/DL — SIGNIFICANT CHANGE UP (ref 32–36)
MCV RBC AUTO: 87.3 FL — SIGNIFICANT CHANGE UP (ref 80–100)
MONOCYTES # BLD AUTO: 0.43 K/UL — SIGNIFICANT CHANGE UP (ref 0–0.9)
MONOCYTES NFR BLD AUTO: 6.2 % — SIGNIFICANT CHANGE UP (ref 2–14)
NEUTROPHILS # BLD AUTO: 5.66 K/UL — SIGNIFICANT CHANGE UP (ref 1.8–7.4)
NEUTROPHILS NFR BLD AUTO: 81.7 % — HIGH (ref 43–77)
NRBC # BLD: 0 /100 WBCS — SIGNIFICANT CHANGE UP (ref 0–0)
NT-PROBNP SERPL-SCNC: 193 PG/ML — HIGH (ref 0–125)
PLATELET # BLD AUTO: 130 K/UL — LOW (ref 150–400)
POTASSIUM SERPL-MCNC: 4 MMOL/L — SIGNIFICANT CHANGE UP (ref 3.5–5.3)
POTASSIUM SERPL-SCNC: 4 MMOL/L — SIGNIFICANT CHANGE UP (ref 3.5–5.3)
PROT SERPL-MCNC: 6.5 G/DL — SIGNIFICANT CHANGE UP (ref 6–8.3)
PROTHROM AB SERPL-ACNC: 13.7 SEC — HIGH (ref 10.6–13.6)
RBC # BLD: 5.05 M/UL — SIGNIFICANT CHANGE UP (ref 4.2–5.8)
RBC # FLD: 14.2 % — SIGNIFICANT CHANGE UP (ref 10.3–14.5)
SODIUM SERPL-SCNC: 141 MMOL/L — SIGNIFICANT CHANGE UP (ref 135–145)
TROPONIN I, HIGH SENSITIVITY RESULT: 9 NG/L — SIGNIFICANT CHANGE UP
WBC # BLD: 6.93 K/UL — SIGNIFICANT CHANGE UP (ref 3.8–10.5)
WBC # FLD AUTO: 6.93 K/UL — SIGNIFICANT CHANGE UP (ref 3.8–10.5)

## 2021-11-11 PROCEDURE — 93005 ELECTROCARDIOGRAM TRACING: CPT

## 2021-11-11 PROCEDURE — 85610 PROTHROMBIN TIME: CPT

## 2021-11-11 PROCEDURE — 83880 ASSAY OF NATRIURETIC PEPTIDE: CPT

## 2021-11-11 PROCEDURE — 80053 COMPREHEN METABOLIC PANEL: CPT

## 2021-11-11 PROCEDURE — 85025 COMPLETE CBC W/AUTO DIFF WBC: CPT

## 2021-11-11 PROCEDURE — 82553 CREATINE MB FRACTION: CPT

## 2021-11-11 PROCEDURE — 83735 ASSAY OF MAGNESIUM: CPT

## 2021-11-11 PROCEDURE — 84484 ASSAY OF TROPONIN QUANT: CPT

## 2021-11-11 PROCEDURE — 99285 EMERGENCY DEPT VISIT HI MDM: CPT

## 2021-11-11 PROCEDURE — 36415 COLL VENOUS BLD VENIPUNCTURE: CPT

## 2021-11-11 PROCEDURE — 99284 EMERGENCY DEPT VISIT MOD MDM: CPT | Mod: 25

## 2021-11-11 PROCEDURE — 93010 ELECTROCARDIOGRAM REPORT: CPT

## 2021-11-11 PROCEDURE — 85730 THROMBOPLASTIN TIME PARTIAL: CPT

## 2021-11-11 NOTE — ED PROVIDER NOTE - NSICDXPASTMEDICALHX_GEN_ALL_CORE_FT
PAST MEDICAL HISTORY:  CAD (coronary artery disease)     HTN (hypertension)     Hyperlipidemia     Hypertension     MI (myocardial infarction)     MI (myocardial infarction)     Stented coronary artery

## 2021-11-11 NOTE — ED PROVIDER NOTE - PATIENT PORTAL LINK FT
You can access the FollowMyHealth Patient Portal offered by [facility] by registering at the following website: [email or web address]. By joining [employer]’s FollowMyHealth portal, you will also be able to view your health information using other applications (apps) compatible with our system.

## 2021-11-11 NOTE — ED ADULT NURSE NOTE - OBJECTIVE STATEMENT
Pt presents to the ED s/p dizziness, that resolved once he got to the ED. EKG one, pt placed on cardiac monitor. Pt denies chest discomfort.

## 2021-11-11 NOTE — ED PROVIDER NOTE - OBJECTIVE STATEMENT
66 male presents to ER states he ate dinner late last night 9:30pm, went to bed 12am, awoke at 6am to get ready for work, states when he got up he felt dizzy and light headed, he sat back down on the bed, tried to get up again and felt more light headed and weakness of legs, sat down on chair for 30 minutes, patient took his usual morning meds Entresto, metoprolol, asa 81mg, spironolactone, ate breakfast and drove to ER, patient states he currently feels well now.

## 2021-11-11 NOTE — ED PROVIDER NOTE - PROGRESS NOTE DETAILS
medtronic PPM/ICD interogated, report faxed, no acute findings, report placed in chart labs, ekg, orthostatics checked, patient feeling well, agree to f/u with PCP, understands to return to ER for worsneing of symptoms

## 2021-11-11 NOTE — ED PROVIDER NOTE - CARE PROVIDER_API CALL
Kellie Bear S  INTERNAL MEDICINE  [address] [place] [id number]  Phone: [phone]  Fax: [phone]  Follow Up Time:

## 2021-11-11 NOTE — ED PROVIDER NOTE - NSFOLLOWUPINSTRUCTIONS_ED_ALL_ED_FT
WHAT YOU NEED TO KNOW:    Dizziness is a feeling of being off balance or unsteady. Common causes of dizziness are an inner ear fluid imbalance or a lack of oxygen in your blood. Dizziness may be acute (lasts 3 days or less) or chronic (lasts longer than 3 days). You may have dizzy spells that last from seconds to a few hours.     DISCHARGE INSTRUCTIONS:    Return to the emergency department if:   •You have a headache and a stiff neck.      •You have shaking chills and a fever.       •You vomit over and over with no relief.       •Your vomit or bowel movements are red or black.       •You have pain in your chest, back, or abdomen.       •You have numbness, especially in your face, arms, or legs.       •You have trouble moving your arms or legs.       •You are confused.       Contact your healthcare provider if:   •You have a fever.       •Your symptoms do not get better with treatment.       •You have questions or concerns about your condition or care.       Manage your symptoms:   •Do not drive or operate heavy machinery when you are dizzy.       •Get up slowly from sitting or lying down.       •Drink plenty of liquids. Liquids help prevent dehydration. Ask how much liquid to drink each day and which liquids are best for you.      Follow up with your doctor as directed: Write down your questions so you remember to ask them during your visits.        © Copyright [employer] 2021           back to top                          © Copyright [employer] 2021

## 2021-11-11 NOTE — ED PROVIDER NOTE - NSICDXPASTSURGICALHX_GEN_ALL_CORE_FT
PAST SURGICAL HISTORY:  History of coronary artery stent placement x1    History of tonsillectomy     S/P implantation of automatic cardioverter/defibrillator (AICD)

## 2021-11-11 NOTE — ED ADULT TRIAGE NOTE - PRO INTERPRETER NEED 2
Problem: Ventilation Defect:  Goal: Ability to achieve and maintain unassisted ventilation or tolerate decreased levels of ventilator support    Intervention: Support and monitor invasive and noninvasive mechanical ventilation  Adult Ventilation Update    Total Vent Days: 4    Patient Lines/Drains/Airways Status    Active Airway     Name: Placement date: Placement time: Site: Days:    Airway Group ET Tube Oral 8.0 03/20/18   1215   Oral   4              APVCMV  RR 12  Vt 430  PEEP 12  FiO2 50%         English

## 2021-11-22 ENCOUNTER — NON-APPOINTMENT (OUTPATIENT)
Age: 66
End: 2021-11-22

## 2021-11-22 ENCOUNTER — APPOINTMENT (OUTPATIENT)
Dept: ELECTROPHYSIOLOGY | Facility: CLINIC | Age: 66
End: 2021-11-22
Payer: MEDICARE

## 2021-11-22 VITALS
WEIGHT: 160 LBS | HEART RATE: 68 BPM | BODY MASS INDEX: 26.66 KG/M2 | SYSTOLIC BLOOD PRESSURE: 125 MMHG | DIASTOLIC BLOOD PRESSURE: 74 MMHG | OXYGEN SATURATION: 99 % | HEIGHT: 65 IN

## 2021-11-22 PROCEDURE — 93282 PRGRMG EVAL IMPLANTABLE DFB: CPT

## 2021-11-22 PROCEDURE — 93000 ELECTROCARDIOGRAM COMPLETE: CPT | Mod: 59

## 2021-12-14 NOTE — DISCHARGE NOTE PROVIDER - NSDCFUSCHEDAPPT_GEN_ALL_CORE_FT
TIFFANIE MARKS ; 02/28/2020 ; [facility] Cardio Electro 300 Comm TIFFANIE Romero ; 03/27/2020 ; [facility] Cardio Electro 300 Comm TIFFANIE Romero ; 04/08/2020 ; [facility] Cardio Echo 300 Comm  TIFFANIE MARKS ; 02/28/2020 ; [facility] Cardio Electro 300 Comm TIFFANIE Romero ; 03/27/2020 ; [facility] Cardio Electro 300 Comm TIFFANIE Romero ; 04/08/2020 ; [facility] Cardio Echo 300 Comm  TIFFANIE MARKS ; 02/28/2020 ; [facility] Cardio Electro 300 Comm TIFFANIE Romero ; 03/27/2020 ; [facility] Cardio Electro 300 Comm TIFFANIE Romero ; 04/08/2020 ; [facility] Cardio Echo 300 Comm  TIFFANIE MARKS ; 02/28/2020 ; [facility] Cardio Electro 300 Comm TIFFANIE Romero ; 03/10/2020 ; [facility] Cardio 300 Comm TIFFANIE LEWIS ; 03/27/2020 ; [facility] Cardio Electro 300 Comm TIFFANIE Romero ; 04/08/2020 ; [facility] Cardio Echo 300 Comm  leg

## 2021-12-15 ENCOUNTER — APPOINTMENT (OUTPATIENT)
Dept: CARDIOLOGY | Facility: HOSPITAL | Age: 66
End: 2021-12-15

## 2021-12-15 ENCOUNTER — OUTPATIENT (OUTPATIENT)
Dept: OUTPATIENT SERVICES | Facility: HOSPITAL | Age: 66
LOS: 1 days | End: 2021-12-15
Payer: COMMERCIAL

## 2021-12-15 VITALS — HEART RATE: 63 BPM | OXYGEN SATURATION: 98 % | SYSTOLIC BLOOD PRESSURE: 121 MMHG | DIASTOLIC BLOOD PRESSURE: 74 MMHG

## 2021-12-15 DIAGNOSIS — Z86.79 OTHER SPECIFIED POSTPROCEDURAL STATES: ICD-10-CM

## 2021-12-15 DIAGNOSIS — Z98.890 OTHER SPECIFIED POSTPROCEDURAL STATES: ICD-10-CM

## 2021-12-15 DIAGNOSIS — Z95.810 PRESENCE OF AUTOMATIC (IMPLANTABLE) CARDIAC DEFIBRILLATOR: Chronic | ICD-10-CM

## 2021-12-15 DIAGNOSIS — Z95.5 PRESENCE OF CORONARY ANGIOPLASTY IMPLANT AND GRAFT: Chronic | ICD-10-CM

## 2021-12-15 DIAGNOSIS — I25.10 ATHEROSCLEROTIC HEART DISEASE OF NATIVE CORONARY ARTERY WITHOUT ANGINA PECTORIS: ICD-10-CM

## 2021-12-15 DIAGNOSIS — Z98.89 OTHER SPECIFIED POSTPROCEDURAL STATES: Chronic | ICD-10-CM

## 2021-12-15 PROCEDURE — 93005 ELECTROCARDIOGRAM TRACING: CPT

## 2021-12-15 PROCEDURE — G0463: CPT

## 2021-12-16 DIAGNOSIS — Z98.890 OTHER SPECIFIED POSTPROCEDURAL STATES: ICD-10-CM

## 2021-12-16 DIAGNOSIS — I42.9 CARDIOMYOPATHY, UNSPECIFIED: ICD-10-CM

## 2021-12-16 DIAGNOSIS — Z95.810 PRESENCE OF AUTOMATIC (IMPLANTABLE) CARDIAC DEFIBRILLATOR: ICD-10-CM

## 2021-12-16 NOTE — HISTORY OF PRESENT ILLNESS
[FreeTextEntry1] : 65M hx of hx of STEMI 12/2013 s/p BMS to proximal LAD, and ICM HFrEF ~20% s/p ICD and recent gen change 2/2020, VT s/p DCCV and VT ablation 2/2020, provoked RUE DVT on Eliquis, here for f/u. Since last visit, pt has been feeling well without any cardiac complaints. Has been compliant with his medications. Now working full time at his [employer] job, ambulating up flights of stairs without issue. Went to [facility] for dizziness that occurred upon standing 1 month ago; all symptoms had resolved prior to arrival of ED with routine bloodwork including BMP, CBC, PT/INR and proBNP all wnl.  Notices mild lightheadedness when going from sitting to standing abruptly, otherwise does not occur when changing position in a controlled fashion. Denies CP, palpitations, dyspnea presyncope/syncope, LE edema, palpitations, ICD shocks, UE pain/swelling. Interrogation reviewed from 11/2021, short episodes of SVT; likely sinus tach as occurred during work hours and does not recall any symptoms.\par  \par \par  \par

## 2022-01-20 NOTE — ED PROVIDER NOTE - NS ED ATTENDING STATEMENT MOD
show
I have personally performed a face to face diagnostic evaluation on this patient. I have reviewed the ACP note and agree with the history, exam and plan of care, except as noted.

## 2022-02-14 ENCOUNTER — NON-APPOINTMENT (OUTPATIENT)
Age: 67
End: 2022-02-14

## 2022-02-23 ENCOUNTER — NON-APPOINTMENT (OUTPATIENT)
Age: 67
End: 2022-02-23

## 2022-02-23 ENCOUNTER — APPOINTMENT (OUTPATIENT)
Dept: ELECTROPHYSIOLOGY | Facility: CLINIC | Age: 67
End: 2022-02-23
Payer: MEDICARE

## 2022-02-23 PROCEDURE — 93296 REM INTERROG EVL PM/IDS: CPT

## 2022-02-23 PROCEDURE — 93295 DEV INTERROG REMOTE 1/2/MLT: CPT

## 2022-03-07 ENCOUNTER — APPOINTMENT (OUTPATIENT)
Dept: ELECTROPHYSIOLOGY | Facility: CLINIC | Age: 67
End: 2022-03-07
Payer: MEDICARE

## 2022-03-07 ENCOUNTER — NON-APPOINTMENT (OUTPATIENT)
Age: 67
End: 2022-03-07

## 2022-03-07 VITALS
BODY MASS INDEX: 26.66 KG/M2 | WEIGHT: 160 LBS | HEART RATE: 74 BPM | HEIGHT: 65 IN | OXYGEN SATURATION: 98 % | SYSTOLIC BLOOD PRESSURE: 110 MMHG | DIASTOLIC BLOOD PRESSURE: 68 MMHG

## 2022-03-07 PROCEDURE — 93282 PRGRMG EVAL IMPLANTABLE DFB: CPT

## 2022-03-07 PROCEDURE — 93000 ELECTROCARDIOGRAM COMPLETE: CPT | Mod: 59

## 2022-03-23 ENCOUNTER — APPOINTMENT (OUTPATIENT)
Dept: GASTROENTEROLOGY | Facility: CLINIC | Age: 67
End: 2022-03-23
Payer: MEDICARE

## 2022-03-23 DIAGNOSIS — R07.9 CHEST PAIN, UNSPECIFIED: ICD-10-CM

## 2022-03-23 DIAGNOSIS — R10.13 EPIGASTRIC PAIN: ICD-10-CM

## 2022-03-23 DIAGNOSIS — R12 HEARTBURN: ICD-10-CM

## 2022-03-23 DIAGNOSIS — Z12.12 ENCOUNTER FOR SCREENING FOR MALIGNANT NEOPLASM OF COLON: ICD-10-CM

## 2022-03-23 DIAGNOSIS — Z12.11 ENCOUNTER FOR SCREENING FOR MALIGNANT NEOPLASM OF COLON: ICD-10-CM

## 2022-03-23 PROCEDURE — 99203 OFFICE O/P NEW LOW 30 MIN: CPT

## 2022-03-23 RX ORDER — OMEPRAZOLE 40 MG/1
40 CAPSULE, DELAYED RELEASE ORAL DAILY
Qty: 30 | Refills: 5 | Status: ACTIVE | COMMUNITY
Start: 2022-03-23

## 2022-03-23 NOTE — PHYSICAL EXAM
[General Appearance - Alert] : alert [FreeTextEntry1] : 66-year-old gentleman, appears healthy, no acute distress, alert and oriented x3 [General Appearance - In No Acute Distress] : in no acute distress [Sclera] : the sclera and conjunctiva were normal [Neck Appearance] : the appearance of the neck was normal [Neck Cervical Mass (___cm)] : no neck mass was observed [Jugular Venous Distention Increased] : there was no jugular-venous distention [Apical Impulse] : the apical impulse was normal [Heart Rate And Rhythm] : heart rate was normal and rhythm regular [Heart Sounds] : normal S1 and S2 [Heart Sounds Gallop] : no gallops [Murmurs] : no murmurs [Heart Sounds Pericardial Friction Rub] : no pericardial rub [Full Pulse] : the pedal pulses are present [Edema] : there was no peripheral edema [Bowel Sounds] : normal bowel sounds [Abdomen Soft] : soft [] : no hepato-splenomegaly [Abdomen Tenderness] : non-tender [Abdomen Mass (___ Cm)] : no abdominal mass palpated [Cervical Lymph Nodes Enlarged Posterior Bilaterally] : posterior cervical [Cervical Lymph Nodes Enlarged Anterior Bilaterally] : anterior cervical [Axillary Lymph Nodes Enlarged Bilaterally] : axillary [Supraclavicular Lymph Nodes Enlarged Bilaterally] : supraclavicular [Femoral Lymph Nodes Enlarged Bilaterally] : femoral [Inguinal Lymph Nodes Enlarged Bilaterally] : inguinal [No CVA Tenderness] : no ~M costovertebral angle tenderness [No Spinal Tenderness] : no spinal tenderness [Abnormal Walk] : normal gait [Nail Clubbing] : no clubbing  or cyanosis of the fingernails [Musculoskeletal - Swelling] : no joint swelling seen [Motor Tone] : muscle strength and tone were normal [Skin Color & Pigmentation] : normal skin color and pigmentation [Skin Turgor] : normal skin turgor [No Focal Deficits] : no focal deficits [Oriented To Time, Place, And Person] : oriented to person, place, and time [Impaired Insight] : insight and judgment were intact [Affect] : the affect was normal

## 2022-03-23 NOTE — REASON FOR VISIT
[Initial Evaluation] : an initial evaluation [FreeTextEntry1] : Chest pain, ER visit, told it was probably abdominal in origin, epigastric discomfort, heartburn

## 2022-03-23 NOTE — ASSESSMENT
[FreeTextEntry1] : Impression\par \par Atypical chest pain\par \par GERD\par \par Colon cancer screening\par \par Patient with cardiomyopathy, MI, pacemaker defibrillator\par \par Suggest\par \par Omeprazole 40 mg a day\par \par Gaviscon as needed\par \par Antireflux diet\par \par We spoke at length\par \par He is a high risk patient for upper endoscopy or colonoscopy\par \par He will do esophagram, double contrast upper GI series\par \par Ultrasound of the abdomen\par \par And CAT scan virtual colonoscopy\par \par Follow-up after above\par \par Follow-up with cardiology

## 2022-03-23 NOTE — HISTORY OF PRESENT ILLNESS
[de-identified] : Kellie Bear MD\par \par [address]\[place] [address] \par  \par Pleasant 66-year-old\par \par Cardiomyopathy, myocardial infarction, defibrillator pacemaker\par \par Presented to hospital recently with chest discomfort\par \par Told that it was noncardiac in origin but might be abdominal in origin\par \par Heartburn\par \par No dysphagia or odynophagia\par \par No anorexia, nausea, vomiting\par \par No change in bowel habit\par \par Also sent for screening colonoscopy\par \par No family history of colon or rectal cancer, gastric cancer, pancreatic neoplasm\par \par He is eating well, no blood per rectum

## 2022-04-12 ENCOUNTER — OUTPATIENT (OUTPATIENT)
Dept: OUTPATIENT SERVICES | Facility: HOSPITAL | Age: 67
LOS: 1 days | End: 2022-04-12
Payer: COMMERCIAL

## 2022-04-12 ENCOUNTER — APPOINTMENT (OUTPATIENT)
Dept: ULTRASOUND IMAGING | Facility: CLINIC | Age: 67
End: 2022-04-12
Payer: MEDICARE

## 2022-04-12 ENCOUNTER — APPOINTMENT (OUTPATIENT)
Dept: CT IMAGING | Facility: CLINIC | Age: 67
End: 2022-04-12
Payer: MEDICARE

## 2022-04-12 ENCOUNTER — RESULT REVIEW (OUTPATIENT)
Age: 67
End: 2022-04-12

## 2022-04-12 DIAGNOSIS — Z95.5 PRESENCE OF CORONARY ANGIOPLASTY IMPLANT AND GRAFT: Chronic | ICD-10-CM

## 2022-04-12 DIAGNOSIS — Z00.8 ENCOUNTER FOR OTHER GENERAL EXAMINATION: ICD-10-CM

## 2022-04-12 DIAGNOSIS — Z98.89 OTHER SPECIFIED POSTPROCEDURAL STATES: Chronic | ICD-10-CM

## 2022-04-12 DIAGNOSIS — Z95.810 PRESENCE OF AUTOMATIC (IMPLANTABLE) CARDIAC DEFIBRILLATOR: Chronic | ICD-10-CM

## 2022-04-12 DIAGNOSIS — R10.13 EPIGASTRIC PAIN: ICD-10-CM

## 2022-04-12 PROCEDURE — 74261 CT COLONOGRAPHY DX: CPT

## 2022-04-12 PROCEDURE — 74261 CT COLONOGRAPHY DX: CPT | Mod: 26

## 2022-04-12 PROCEDURE — 76700 US EXAM ABDOM COMPLETE: CPT | Mod: 26

## 2022-04-12 PROCEDURE — 76700 US EXAM ABDOM COMPLETE: CPT

## 2022-04-13 ENCOUNTER — NON-APPOINTMENT (OUTPATIENT)
Age: 67
End: 2022-04-13

## 2022-04-13 DIAGNOSIS — K76.9 LIVER DISEASE, UNSPECIFIED: ICD-10-CM

## 2022-04-19 NOTE — ED ADULT NURSE NOTE - TEMPLATE
April 19, 2022     Patient: Mi Pagan  YOB: 1971  Date of Visit: 4/19/2022      To Whom it May Concern:    Corwin Funk is under my professional care  Hetal Morton was seen in my office on 4/19/2022  Hetal Morton may return to work on May 09 2022  If you have any questions or concerns, please don't hesitate to call           Sincerely,          Dena Duran,         CC: No Recipients EENMT

## 2022-04-25 ENCOUNTER — APPOINTMENT (OUTPATIENT)
Dept: CT IMAGING | Facility: CLINIC | Age: 67
End: 2022-04-25
Payer: MEDICARE

## 2022-04-25 ENCOUNTER — OUTPATIENT (OUTPATIENT)
Dept: OUTPATIENT SERVICES | Facility: HOSPITAL | Age: 67
LOS: 1 days | End: 2022-04-25
Payer: COMMERCIAL

## 2022-04-25 DIAGNOSIS — Z95.5 PRESENCE OF CORONARY ANGIOPLASTY IMPLANT AND GRAFT: Chronic | ICD-10-CM

## 2022-04-25 DIAGNOSIS — Z00.8 ENCOUNTER FOR OTHER GENERAL EXAMINATION: ICD-10-CM

## 2022-04-25 DIAGNOSIS — K76.9 LIVER DISEASE, UNSPECIFIED: ICD-10-CM

## 2022-04-25 DIAGNOSIS — Z95.810 PRESENCE OF AUTOMATIC (IMPLANTABLE) CARDIAC DEFIBRILLATOR: Chronic | ICD-10-CM

## 2022-04-25 DIAGNOSIS — Z98.89 OTHER SPECIFIED POSTPROCEDURAL STATES: Chronic | ICD-10-CM

## 2022-04-25 PROCEDURE — 74177 CT ABD & PELVIS W/CONTRAST: CPT

## 2022-04-25 PROCEDURE — 74177 CT ABD & PELVIS W/CONTRAST: CPT | Mod: 26

## 2022-04-27 ENCOUNTER — NON-APPOINTMENT (OUTPATIENT)
Age: 67
End: 2022-04-27

## 2022-05-02 ENCOUNTER — OUTPATIENT (OUTPATIENT)
Dept: OUTPATIENT SERVICES | Facility: HOSPITAL | Age: 67
LOS: 1 days | End: 2022-05-02
Payer: COMMERCIAL

## 2022-05-02 ENCOUNTER — NON-APPOINTMENT (OUTPATIENT)
Age: 67
End: 2022-05-02

## 2022-05-02 DIAGNOSIS — R10.13 EPIGASTRIC PAIN: ICD-10-CM

## 2022-05-02 DIAGNOSIS — Z95.5 PRESENCE OF CORONARY ANGIOPLASTY IMPLANT AND GRAFT: Chronic | ICD-10-CM

## 2022-05-02 DIAGNOSIS — Z95.810 PRESENCE OF AUTOMATIC (IMPLANTABLE) CARDIAC DEFIBRILLATOR: Chronic | ICD-10-CM

## 2022-05-02 DIAGNOSIS — Z98.89 OTHER SPECIFIED POSTPROCEDURAL STATES: Chronic | ICD-10-CM

## 2022-05-02 PROCEDURE — 74246 X-RAY XM UPR GI TRC 2CNTRST: CPT

## 2022-05-02 PROCEDURE — A9698: CPT

## 2022-05-02 PROCEDURE — 74246 X-RAY XM UPR GI TRC 2CNTRST: CPT | Mod: 26

## 2022-05-16 ENCOUNTER — EMERGENCY (EMERGENCY)
Facility: HOSPITAL | Age: 67
LOS: 1 days | Discharge: HOME | End: 2022-05-16
Attending: STUDENT IN AN ORGANIZED HEALTH CARE EDUCATION/TRAINING PROGRAM | Admitting: STUDENT IN AN ORGANIZED HEALTH CARE EDUCATION/TRAINING PROGRAM
Payer: COMMERCIAL

## 2022-05-16 VITALS
OXYGEN SATURATION: 97 % | SYSTOLIC BLOOD PRESSURE: 111 MMHG | RESPIRATION RATE: 16 BRPM | DIASTOLIC BLOOD PRESSURE: 72 MMHG | TEMPERATURE: 98 F | HEART RATE: 59 BPM

## 2022-05-16 VITALS
DIASTOLIC BLOOD PRESSURE: 63 MMHG | WEIGHT: 158.07 LBS | OXYGEN SATURATION: 98 % | TEMPERATURE: 97 F | RESPIRATION RATE: 16 BRPM | HEART RATE: 81 BPM | SYSTOLIC BLOOD PRESSURE: 99 MMHG | HEIGHT: 65 IN

## 2022-05-16 DIAGNOSIS — Z95.810 PRESENCE OF AUTOMATIC (IMPLANTABLE) CARDIAC DEFIBRILLATOR: Chronic | ICD-10-CM

## 2022-05-16 DIAGNOSIS — Z95.5 PRESENCE OF CORONARY ANGIOPLASTY IMPLANT AND GRAFT: Chronic | ICD-10-CM

## 2022-05-16 DIAGNOSIS — Z98.89 OTHER SPECIFIED POSTPROCEDURAL STATES: Chronic | ICD-10-CM

## 2022-05-16 LAB
ALBUMIN SERPL ELPH-MCNC: 3.2 G/DL — LOW (ref 3.3–5)
ALP SERPL-CCNC: 60 U/L — SIGNIFICANT CHANGE UP (ref 40–120)
ALT FLD-CCNC: 21 U/L — SIGNIFICANT CHANGE UP (ref 12–78)
ANION GAP SERPL CALC-SCNC: 6 MMOL/L — SIGNIFICANT CHANGE UP (ref 5–17)
APPEARANCE UR: CLEAR — SIGNIFICANT CHANGE UP
AST SERPL-CCNC: 17 U/L — SIGNIFICANT CHANGE UP (ref 15–37)
BASOPHILS # BLD AUTO: 0.03 K/UL — SIGNIFICANT CHANGE UP (ref 0–0.2)
BASOPHILS NFR BLD AUTO: 0.4 % — SIGNIFICANT CHANGE UP (ref 0–2)
BILIRUB SERPL-MCNC: 0.9 MG/DL — SIGNIFICANT CHANGE UP (ref 0.2–1.2)
BILIRUB UR-MCNC: NEGATIVE — SIGNIFICANT CHANGE UP
BUN SERPL-MCNC: 29 MG/DL — HIGH (ref 7–23)
CALCIUM SERPL-MCNC: 9.2 MG/DL — SIGNIFICANT CHANGE UP (ref 8.5–10.1)
CHLORIDE SERPL-SCNC: 110 MMOL/L — HIGH (ref 96–108)
CO2 SERPL-SCNC: 27 MMOL/L — SIGNIFICANT CHANGE UP (ref 22–31)
COLOR SPEC: YELLOW — SIGNIFICANT CHANGE UP
COMMENT - URINE: SIGNIFICANT CHANGE UP
CREAT SERPL-MCNC: 1 MG/DL — SIGNIFICANT CHANGE UP (ref 0.5–1.3)
DIFF PNL FLD: ABNORMAL
EGFR: 83 ML/MIN/1.73M2 — SIGNIFICANT CHANGE UP
EOSINOPHIL # BLD AUTO: 0.11 K/UL — SIGNIFICANT CHANGE UP (ref 0–0.5)
EOSINOPHIL NFR BLD AUTO: 1.6 % — SIGNIFICANT CHANGE UP (ref 0–6)
GLUCOSE SERPL-MCNC: 98 MG/DL — SIGNIFICANT CHANGE UP (ref 70–99)
GLUCOSE UR QL: NEGATIVE — SIGNIFICANT CHANGE UP
HCT VFR BLD CALC: 45.9 % — SIGNIFICANT CHANGE UP (ref 39–50)
HGB BLD-MCNC: 15.1 G/DL — SIGNIFICANT CHANGE UP (ref 13–17)
IMM GRANULOCYTES NFR BLD AUTO: 0.3 % — SIGNIFICANT CHANGE UP (ref 0–1.5)
KETONES UR-MCNC: NEGATIVE — SIGNIFICANT CHANGE UP
LEUKOCYTE ESTERASE UR-ACNC: ABNORMAL
LIDOCAIN IGE QN: 122 U/L — SIGNIFICANT CHANGE UP (ref 73–393)
LYMPHOCYTES # BLD AUTO: 1.18 K/UL — SIGNIFICANT CHANGE UP (ref 1–3.3)
LYMPHOCYTES # BLD AUTO: 17.3 % — SIGNIFICANT CHANGE UP (ref 13–44)
MCHC RBC-ENTMCNC: 29.8 PG — SIGNIFICANT CHANGE UP (ref 27–34)
MCHC RBC-ENTMCNC: 32.9 GM/DL — SIGNIFICANT CHANGE UP (ref 32–36)
MCV RBC AUTO: 90.5 FL — SIGNIFICANT CHANGE UP (ref 80–100)
MONOCYTES # BLD AUTO: 0.68 K/UL — SIGNIFICANT CHANGE UP (ref 0–0.9)
MONOCYTES NFR BLD AUTO: 10 % — SIGNIFICANT CHANGE UP (ref 2–14)
NEUTROPHILS # BLD AUTO: 4.8 K/UL — SIGNIFICANT CHANGE UP (ref 1.8–7.4)
NEUTROPHILS NFR BLD AUTO: 70.4 % — SIGNIFICANT CHANGE UP (ref 43–77)
NITRITE UR-MCNC: NEGATIVE — SIGNIFICANT CHANGE UP
NRBC # BLD: 0 /100 WBCS — SIGNIFICANT CHANGE UP (ref 0–0)
PH UR: 6 — SIGNIFICANT CHANGE UP (ref 5–8)
PLATELET # BLD AUTO: 146 K/UL — LOW (ref 150–400)
POTASSIUM SERPL-MCNC: 4.3 MMOL/L — SIGNIFICANT CHANGE UP (ref 3.5–5.3)
POTASSIUM SERPL-SCNC: 4.3 MMOL/L — SIGNIFICANT CHANGE UP (ref 3.5–5.3)
PROT SERPL-MCNC: 6.5 G/DL — SIGNIFICANT CHANGE UP (ref 6–8.3)
PROT UR-MCNC: NEGATIVE — SIGNIFICANT CHANGE UP
RBC # BLD: 5.07 M/UL — SIGNIFICANT CHANGE UP (ref 4.2–5.8)
RBC # FLD: 14.5 % — SIGNIFICANT CHANGE UP (ref 10.3–14.5)
RBC CASTS # UR COMP ASSIST: ABNORMAL /HPF (ref 0–4)
SODIUM SERPL-SCNC: 143 MMOL/L — SIGNIFICANT CHANGE UP (ref 135–145)
SP GR SPEC: 1.02 — SIGNIFICANT CHANGE UP (ref 1.01–1.02)
UROBILINOGEN FLD QL: NEGATIVE — SIGNIFICANT CHANGE UP
WBC # BLD: 6.82 K/UL — SIGNIFICANT CHANGE UP (ref 3.8–10.5)
WBC # FLD AUTO: 6.82 K/UL — SIGNIFICANT CHANGE UP (ref 3.8–10.5)
WBC UR QL: SIGNIFICANT CHANGE UP

## 2022-05-16 PROCEDURE — 36415 COLL VENOUS BLD VENIPUNCTURE: CPT

## 2022-05-16 PROCEDURE — 76870 US EXAM SCROTUM: CPT

## 2022-05-16 PROCEDURE — 74176 CT ABD & PELVIS W/O CONTRAST: CPT | Mod: 26,MA

## 2022-05-16 PROCEDURE — 80053 COMPREHEN METABOLIC PANEL: CPT

## 2022-05-16 PROCEDURE — 76870 US EXAM SCROTUM: CPT | Mod: 26

## 2022-05-16 PROCEDURE — 85025 COMPLETE CBC W/AUTO DIFF WBC: CPT

## 2022-05-16 PROCEDURE — 93975 VASCULAR STUDY: CPT

## 2022-05-16 PROCEDURE — 81001 URINALYSIS AUTO W/SCOPE: CPT

## 2022-05-16 PROCEDURE — 99285 EMERGENCY DEPT VISIT HI MDM: CPT | Mod: 25

## 2022-05-16 PROCEDURE — 99285 EMERGENCY DEPT VISIT HI MDM: CPT

## 2022-05-16 PROCEDURE — 83690 ASSAY OF LIPASE: CPT

## 2022-05-16 PROCEDURE — 93975 VASCULAR STUDY: CPT | Mod: 26

## 2022-05-16 PROCEDURE — 87086 URINE CULTURE/COLONY COUNT: CPT

## 2022-05-16 PROCEDURE — 74176 CT ABD & PELVIS W/O CONTRAST: CPT | Mod: MA

## 2022-05-16 RX ORDER — SODIUM CHLORIDE 9 MG/ML
1000 INJECTION INTRAMUSCULAR; INTRAVENOUS; SUBCUTANEOUS ONCE
Refills: 0 | Status: COMPLETED | OUTPATIENT
Start: 2022-05-16 | End: 2022-05-16

## 2022-05-16 RX ORDER — ONDANSETRON 8 MG/1
4 TABLET, FILM COATED ORAL ONCE
Refills: 0 | Status: DISCONTINUED | OUTPATIENT
Start: 2022-05-16 | End: 2022-05-16

## 2022-05-16 RX ADMIN — SODIUM CHLORIDE 1000 MILLILITER(S): 9 INJECTION INTRAMUSCULAR; INTRAVENOUS; SUBCUTANEOUS at 11:24

## 2022-05-16 NOTE — ED PROVIDER NOTE - NSFOLLOWUPINSTRUCTIONS_ED_ALL_ED_FT
Please follow up with your Primary Care Physician, with a general surgeon and with a urologist.  Please take your medications as prescribed.  If your symptoms persist or worsen, please seek care. Either return to the Emergency Department, go to urgent care or see your primary care doctor.  See refer to general information and follow up instructions below:     Acute Abdominal Pain    WHAT YOU NEED TO KNOW:    The cause of your abdominal pain may not be found. If a cause is found, treatment will depend on what the cause is.     DISCHARGE INSTRUCTIONS:    Return to the emergency department if:     You vomit blood or cannot stop vomiting.      You have blood in your bowel movement or it looks like tar.       You have bleeding from your rectum.       Your abdomen is larger than usual, more painful, and hard.       You have severe pain in your abdomen.       You stop passing gas and having bowel movements.       You feel weak, dizzy, or faint.    Contact your healthcare provider if:     You have a fever.      You have new signs and symptoms.      Your symptoms do not get better with treatment.       You have questions or concerns about your condition or care.    Medicines may be given to decrease pain, treat an infection, and manage your symptoms. Take your medicine as directed. Call your healthcare provider if you think your medicine is not helping or if you have side effects. Tell him if you are allergic to any medicine. Keep a list of the medicines, vitamins, and herbs you take. Include the amounts, and when and why you take them. Bring the list or the pill bottles to follow-up visits. Carry your medicine list with you in case of an emergency.    Manage your symptoms:     Apply heat on your abdomen for 20 to 30 minutes every 2 hours for as many days as directed. Heat helps decrease pain and muscle spasms.       Manage your stress. Stress may cause abdominal pain. Your healthcare provider may recommend relaxation techniques and deep breathing exercises to help decrease your stress. Your healthcare provider may recommend you talk to someone about your stress or anxiety, such as a counselor or a trusted friend. Get plenty of sleep and exercise regularly.       Limit or do not drink alcohol. Alcohol can make your abdominal pain worse. Ask your healthcare provider if it is safe for you to drink alcohol. Also ask how much is safe for you to drink.       Do not smoke. Nicotine and other chemicals in cigarettes can damage your esophagus and stomach. Ask your healthcare provider for information if you currently smoke and need help to quit. E-cigarettes or smokeless tobacco still contain nicotine. Talk to your healthcare provider before you use these products.     Make changes to the food you eat as directed: Do not eat foods that cause abdominal pain or other symptoms. Eat small meals more often.     Eat more high-fiber foods if you are constipated. High-fiber foods include fruits, vegetables, whole-grain foods, and legumes.       Do not eat foods that cause gas if you have bloating. Examples include broccoli, cabbage, and cauliflower. Do not drink soda or carbonated drinks, because these may also cause gas.       Do not eat foods or drinks that contain sorbitol or fructose if you have diarrhea and bloating. Some examples are fruit juices, candy, jelly, and sugar-free gum.       Do not eat high-fat foods, such as fried foods, cheeseburgers, hot dogs, and desserts.      Limit or do not drink caffeine. Caffeine may make symptoms, such as heart burn or nausea, worse.       Drink plenty of liquids to prevent dehydration from diarrhea or vomiting. Ask your healthcare provider how much liquid to drink each day and which liquids are best for you.

## 2022-05-16 NOTE — ED PROVIDER NOTE - OBJECTIVE STATEMENT
66 M history hypertension, hyperlipidemia, CAD with stent, here complaining of 3 weeks of intermittent RLQ pain. worse when standing up. no radiation of pain. pain is sharp. has not been taking anything for it. no associated fevers, chills, nausea, vomiting. No hematuria / dysuria. no constipation, diarrhea. PMD Dr. JOSE JUAN Bear.

## 2022-05-16 NOTE — ED PROVIDER NOTE - PHYSICAL EXAMINATION
Vital signs as available reviewed.  General:  No acute distress.  Head:  Normocephalic, atraumatic.  Eyes:  Conjunctiva pink, no icterus.  Cardiovascular:  Regular rate, no obvious murmur.  Respiratory:  Clear to auscultation, good air entry bilaterally.  Abdomen:  Soft, + RLQ tenderness to palpation.  : + bilateral testicular tenderness to palpation worse on left with right sided swelling. no obvious hernia defect.  Musculoskeletal:  No obvious deformity.  Neurologic: Alert and oriented, moving all extremities.  Skin:  Warm and dry.

## 2022-05-16 NOTE — ED PROVIDER NOTE - CARE PLAN
1 Principal Discharge DX:	Abdominal pain  Secondary Diagnosis:	Epididymal cyst  Secondary Diagnosis:	Left inguinal hernia

## 2022-05-16 NOTE — ED ADULT TRIAGE NOTE - CHIEF COMPLAINT QUOTE
Patient is a 67yo male complaining of right side lower abdominal area x 3 weeks Patient denies fever nausea vomiting diarrhea abdominal surgeries or trauma to the area. 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

## 2022-05-16 NOTE — ED PROVIDER NOTE - PROVIDER TOKENS
PROVIDER:[TOKEN:[[id number]],FOLLOWUP:[4-6 Days]],PROVIDER:[TOKEN:[[id number]],FOLLOWUP:[4-6 Days]]

## 2022-05-16 NOTE — ED PROVIDER NOTE - CARE PROVIDERS DIRECT ADDRESSES
,DirectAddress_Unknown,[email or web address]

## 2022-05-16 NOTE — ED PROVIDER NOTE - CARE PROVIDER_API CALL
Eda Monroe)  Surgery; Surgical Critical Care  [address] [place] [address]  Phone: [phone]  Fax: [phone]  Follow Up Time: 4-6 Days    Baljit Peraza)  Urology  [address], [address]  [place] [address]  Phone: [phone]  Fax: [phone]  Follow Up Time: 4-6 Days

## 2022-05-16 NOTE — ED PROVIDER NOTE - NS ED ROS FT
Constitutional: No reported recent fever.  Neurological: No reported acute headache.  Eyes: No reported new vision changes.   Ears, Nose, Mouth, Throat: No reported acute sore throat.  Cardiovascular: No reported current chest pain.  Respiratory: No reported new shortness of breath.  Gastrointestinal: + RLQ abdominal pain.  Genitourinary: No reported new urinary problems.  Musculoskeletal: No reported acute extremity pain.  Integumentary (skin and/or breast): No reported new rash.

## 2022-05-16 NOTE — ED PROVIDER NOTE - PATIENT PORTAL LINK FT
You can access the FollowMyHealth Patient Portal offered by [facility] by registering at the following website: [email or web address]. By joining [employer]’s FollowMyHealth portal, you will also be able to view your health information using other applications (apps) compatible with our system.

## 2022-05-17 LAB
CULTURE RESULTS: SIGNIFICANT CHANGE UP
SPECIMEN SOURCE: SIGNIFICANT CHANGE UP

## 2022-05-23 ENCOUNTER — APPOINTMENT (OUTPATIENT)
Dept: BARIATRICS | Facility: CLINIC | Age: 67
End: 2022-05-23
Payer: MEDICARE

## 2022-05-23 VITALS
WEIGHT: 159.61 LBS | TEMPERATURE: 97.1 F | OXYGEN SATURATION: 98 % | HEIGHT: 65 IN | SYSTOLIC BLOOD PRESSURE: 110 MMHG | HEART RATE: 77 BPM | BODY MASS INDEX: 26.59 KG/M2 | DIASTOLIC BLOOD PRESSURE: 72 MMHG

## 2022-05-23 DIAGNOSIS — Z92.29 PERSONAL HISTORY OF OTHER DRUG THERAPY: ICD-10-CM

## 2022-05-23 DIAGNOSIS — Z01.818 ENCOUNTER FOR OTHER PREPROCEDURAL EXAMINATION: ICD-10-CM

## 2022-05-23 DIAGNOSIS — Z95.0 PRESENCE OF CARDIAC PACEMAKER: ICD-10-CM

## 2022-05-23 PROCEDURE — 99203 OFFICE O/P NEW LOW 30 MIN: CPT

## 2022-05-23 NOTE — HISTORY OF PRESENT ILLNESS
[de-identified] : Felix Farr is a 66-year-old male with a history of hypertension, NSTEMI, status post AICD, here for follow-up of abdominal pain after recent ED visit at [place] on 5/16/2022.  Surgery was not consulted in the ED at that time, but per ED notes abdominal pain was thought to be related to left inguinal hernia so he was referred to me for follow-up. Patient reports groin pain intermittently more on right than left for the past year. Pain is brought on by sleeping on the right side or heavy lifting.  Alleviated by rest or lying supine.  No issues with urination. No fevers/chills.  Patient states he sometimes feels a lump on the right side when he is having the pain.\par \par CT abdomen from April 25, 2022 shows ABDOMINAL WALL: Small fat-containing left inguinal hernia.  Repeat CT in the ED on 5/16/2022 similarly demonstrated a fat-containing left inguinal hernia with no other acute intra-abdominal Pathology.  No obvious right-sided hernia on imaging.\par \par History of UE DVT s/p Eliquis no longer taking.  Is taking daily aspirin 81 mg, but no other antiplatelet or anticoagulant.

## 2022-05-23 NOTE — ASSESSMENT
[FreeTextEntry1] : 66-year-old male with a history of hypertension, NSTEMI, status post AICD, here for evaluation after recent ED visit for abdominal pain thought to be secondary to left inguinal hernia.

## 2022-05-23 NOTE — PHYSICAL EXAM
[Alert] : alert [Calm] : calm [de-identified] : Sitting comfortably, no acute distress [de-identified] : Normocephalic, atraumatic. Anicteric. [de-identified] : Equal chest rise, nonlabored respirations. No audible wheezing. [de-identified] : Regular rate and rhythm. [de-identified] : Abdomen soft, nondistended, nontender.  Palpable left inguinal hernia soft nontender.  No obvious right-sided inguinal hernia, nontender.

## 2022-05-23 NOTE — PLAN
[FreeTextEntry1] : Recommended that patient follow-up with a urologist for his bilateral hydroceles and left-sided varicocele.\par \par Left inguinal hernia:\par -Symptomatic, recommended surgical repair\par \par Right inguinal pain:\par -No obvious hernia on exam, however, there could be a small hernia which is not seen on CT or palpable on exam.  The patient's symptoms are typical of hernia pain, and he feels a lump while he is having the pain which is more consistent with a hernia than his right sided hydrocele causing the pain.  I discussed with the patient that I would recommend exploring the right inguinal region to determine whether there is a hernia.\par \par Ideally, would plan for a laparoscopic hernia repair in which I could do a bilateral repair.  However, given the patient's cardiac comorbidities patient would need cardiac clearance for general anesthesia in order to do a laparoscopic hernia repair.  Will discuss with his cardiologist Dr. Lisandro Christiansen.  If patient is not a candidate for general anesthesia, then we will plan for an open left inguinal hernia repair and open exploration of the right groin to determine if there is a hernia present.\par \par Patient is a bit hesitant however leaning towards surgical repair.  I offered watchful waiting with close follow-up, though given that he is symptomatic surgical repair is recommended.  Patient will think about it further and call once he has seen his cardiologist, and is ready to schedule surgery.

## 2022-05-23 NOTE — DATA REVIEWED
[FreeTextEntry1] : ACC: [id number] EXAM: CT ABDOMEN AND PELVIS\par \par PROCEDURE DATE: 05/16/2022\par \par \par \par INTERPRETATION: CLINICAL INFORMATION: Intermittent right lower quadrant pain\par \par COMPARISON: 4/25/2022\par \par CONTRAST/COMPLICATIONS:\par IV Contrast: NONE\par Oral Contrast: NONE\par Complications: None reported at time of study completion\par \par PROCEDURE:\par CT of the Abdomen and Pelvis was performed.\par Sagittal and coronal reformats were performed.\par \par FINDINGS:\par LOWER CHEST: Within normal limits.\par \par LIVER: Stable hypoattenuating hepatic lesions likely hemangiomas\par BILE DUCTS: Normal caliber.\par GALLBLADDER: Within normal limits.\par SPLEEN: Within normal limits.\par PANCREAS: Within normal limits.\par ADRENALS: Within normal limits.\par KIDNEYS/URETERS: Right renal cyst similar to prior.\par \par BLADDER: Within normal limits.\par REPRODUCTIVE ORGANS: Enlarged prostate\par \par BOWEL: No bowel obstruction. Colonic diverticula without acute diverticulitis.. Appendix calcified appendicolith. No evidence of appendicitis\par PERITONEUM: No ascites.\par VESSELS: Atherosclerotic, nonaneurysmal\par RETROPERITONEUM/LYMPH NODES: Scattered nonspecific subcentimeter retroperitoneal mesenteric lymph nodes similar to prior.\par ABDOMINAL WALL: Mild widening left inguinal ring with fat\par BONES: Degenerative changes.\par \par IMPRESSION:\par No evidence of appendicitis or other acute finding to account for the symptomatology.\par \par Nonacute findings as discussed\par \par \par \par --- End of Report ---\par \par \par ACC: [id number] EXAM: US DPLX SCROTUM\par ACC: [id number] EXAM: US SCROTUM AND CONTENTS\par \par PROCEDURE DATE: 05/16/2022\par \par \par \par INTERPRETATION: CLINICAL INFORMATION: Right scrotal pain\par \par COMPARISON: Scrotal ultrasound 12/3/2017\par \par TECHNIQUE: Testicular ultrasound utilizing color and spectral Doppler.\par \par FINDINGS:\par \par RIGHT:\par Right testis: 5.6 x 2.2 x 3.7 cm. Cystic ectasia of rete testis. Normal arterial and venous blood flow pattern.\par Right epididymis: Within normal limits. Epididymal head cysts measuring up to 5.5 cm\par Right hydrocele: Small volume\par Right varicocele: None.\par \par LEFT:\par Left testis: 5.1 x 2.4 x 2.9 cm. Cystic ectasia of rete testis. Normal arterial and venous blood flow pattern.\par Left epididymis: Within normal limits. Epididymal head cysts measuring up to 1.6 cm\par Left hydrocele: Small volume\par Left varicocele: Partial visualized\par \par IMPRESSION:\par \par Cystic ectasia of rete testis bilaterally. No sonographic evidence to suggest testicular torsion at this time.\par \par Large bilateral epididymal head cysts measuring up to 5.5 cm on the right.\par \par Small bilateral hydroceles.\par \par Partial visualized left varicocele\par \par \par \par --- End of Report ---

## 2022-05-24 ENCOUNTER — NON-APPOINTMENT (OUTPATIENT)
Age: 67
End: 2022-05-24

## 2022-05-26 ENCOUNTER — APPOINTMENT (OUTPATIENT)
Dept: ELECTROPHYSIOLOGY | Facility: CLINIC | Age: 67
End: 2022-05-26

## 2022-06-02 NOTE — ED PROVIDER NOTE - PMH
CVA (cerebrovascular accident)
HTN (hypertension)    Hyperlipidemia    MI (myocardial infarction)    Stented coronary artery

## 2022-06-07 ENCOUNTER — APPOINTMENT (OUTPATIENT)
Dept: ELECTROPHYSIOLOGY | Facility: CLINIC | Age: 67
End: 2022-06-07

## 2022-06-08 ENCOUNTER — OUTPATIENT (OUTPATIENT)
Dept: OUTPATIENT SERVICES | Facility: HOSPITAL | Age: 67
LOS: 1 days | End: 2022-06-08
Payer: COMMERCIAL

## 2022-06-08 ENCOUNTER — APPOINTMENT (OUTPATIENT)
Dept: CARDIOLOGY | Facility: HOSPITAL | Age: 67
End: 2022-06-08

## 2022-06-08 VITALS
HEART RATE: 75 BPM | WEIGHT: 158 LBS | DIASTOLIC BLOOD PRESSURE: 75 MMHG | HEIGHT: 65 IN | SYSTOLIC BLOOD PRESSURE: 128 MMHG | BODY MASS INDEX: 26.33 KG/M2 | OXYGEN SATURATION: 98 %

## 2022-06-08 DIAGNOSIS — Z98.89 OTHER SPECIFIED POSTPROCEDURAL STATES: Chronic | ICD-10-CM

## 2022-06-08 DIAGNOSIS — I25.10 ATHEROSCLEROTIC HEART DISEASE OF NATIVE CORONARY ARTERY WITHOUT ANGINA PECTORIS: ICD-10-CM

## 2022-06-08 DIAGNOSIS — Z95.810 PRESENCE OF AUTOMATIC (IMPLANTABLE) CARDIAC DEFIBRILLATOR: ICD-10-CM

## 2022-06-08 DIAGNOSIS — Z95.5 PRESENCE OF CORONARY ANGIOPLASTY IMPLANT AND GRAFT: Chronic | ICD-10-CM

## 2022-06-08 DIAGNOSIS — Z95.810 PRESENCE OF AUTOMATIC (IMPLANTABLE) CARDIAC DEFIBRILLATOR: Chronic | ICD-10-CM

## 2022-06-08 PROCEDURE — G0463: CPT

## 2022-06-08 PROCEDURE — 93005 ELECTROCARDIOGRAM TRACING: CPT

## 2022-06-15 DIAGNOSIS — I50.20 UNSPECIFIED SYSTOLIC (CONGESTIVE) HEART FAILURE: ICD-10-CM

## 2022-06-15 DIAGNOSIS — Z95.810 PRESENCE OF AUTOMATIC (IMPLANTABLE) CARDIAC DEFIBRILLATOR: ICD-10-CM

## 2022-06-15 DIAGNOSIS — I49.9 CARDIAC ARRHYTHMIA, UNSPECIFIED: ICD-10-CM

## 2022-06-15 NOTE — HISTORY OF PRESENT ILLNESS
[FreeTextEntry1] : 66M hx of hx of STEMI 12/2013 s/p BMS to proximal LAD, and ICM HFrEF ~20% s/p ICD, gen change 2/2020, VT s/p VT ablation 2/2020, provoked RUE DVT on Eliquis, here for f/u. Since last visit, pt has been feeling well without any cardiac complaints. Has been compliant with his medications. Now working full time at his [employer] job, ambulating up flights of stairs without issue. Has a left sided hernia that went for initial surgical consultation for, is unsure if he wants to proceed but is not planned for surgery at this time. Was told by his PCP to ask if his medications could be reduced as his BP is at times on the low side, although denies any symptoms of low BP. Denies CP, palpitations, dyspnea presyncope/syncope, LE edema, palpitations, ICD shocks, UE pain/swelling.

## 2022-06-15 NOTE — ASSESSMENT
[FreeTextEntry1] : 66M hx of hx of STEMI 12/2013 s/p BMS to proximal LAD, and ICM HFrEF ~20% s/p ICD, gen change 2/2020, s/p VT abalation 2/2020, provoked RUE DVT s/p Eliquis tx, here for follow up.\par \par #ICM - stage B\par -EF 20%, mod MR (2020)\par -euvolemic and w/o new cardiac symptoms\par -c/w ASA 81mg daily\par -tolerating Entresto 24-26mg PO BID; will hold off on increasing given recent ED visit for orthostatic symptoms\par -c/w spironolactone 25mg daily\par -c/w Toprol XL 50mg daily\par -will plan for surveillance TTE to be ordered at next visit\par \par #HTN\par -BP 110s/70s\par -Toprol, entresto/lisinopril, spironolactone\par \par #VT\par -Toprol as above\par -c/w ongoing EP follow up \par \par #Right UE DVT\par -provoked in setting of recent ICD revision\par -s/p 6mos eliquis therapy\par \par #HLD\par -c/w statin\par \par Lisandro Christiansen MD\par \par

## 2022-06-16 ENCOUNTER — NON-APPOINTMENT (OUTPATIENT)
Age: 67
End: 2022-06-16

## 2022-06-16 ENCOUNTER — APPOINTMENT (OUTPATIENT)
Dept: ELECTROPHYSIOLOGY | Facility: CLINIC | Age: 67
End: 2022-06-16

## 2022-06-16 PROCEDURE — 93296 REM INTERROG EVL PM/IDS: CPT

## 2022-06-16 PROCEDURE — 93295 DEV INTERROG REMOTE 1/2/MLT: CPT

## 2022-07-07 NOTE — DISCHARGE NOTE PROVIDER - NSDCCONDITION_GEN_ALL_CORE
Critical view obtained. Cystic duct and artery triply clipped and then cut. Gallbladder taken off liver bed with electrocautery. Hemostasis achieved.
Stable

## 2022-09-02 NOTE — ED ADULT NURSE NOTE - ABDOMEN
Problem: Skin/Tissue Integrity  Goal: Absence of new skin breakdown  Outcome: Progressing     Problem: Safety - Adult  Goal: Free from fall injury  Outcome: Progressing     Problem: Pain  Goal: Verbalizes/displays adequate comfort level or baseline comfort level  Outcome: Progressing soft

## 2022-09-12 ENCOUNTER — APPOINTMENT (OUTPATIENT)
Dept: ELECTROPHYSIOLOGY | Facility: CLINIC | Age: 67
End: 2022-09-12

## 2022-09-12 ENCOUNTER — NON-APPOINTMENT (OUTPATIENT)
Age: 67
End: 2022-09-12

## 2022-09-12 VITALS
OXYGEN SATURATION: 98 % | SYSTOLIC BLOOD PRESSURE: 122 MMHG | BODY MASS INDEX: 25.16 KG/M2 | WEIGHT: 151 LBS | HEART RATE: 70 BPM | DIASTOLIC BLOOD PRESSURE: 73 MMHG | HEIGHT: 65 IN

## 2022-09-12 PROCEDURE — 93000 ELECTROCARDIOGRAM COMPLETE: CPT | Mod: 59

## 2022-09-12 PROCEDURE — 93282 PRGRMG EVAL IMPLANTABLE DFB: CPT

## 2022-09-12 RX ORDER — METOPROLOL SUCCINATE 50 MG/1
50 TABLET, EXTENDED RELEASE ORAL DAILY
Qty: 90 | Refills: 1 | Status: DISCONTINUED | COMMUNITY
Start: 2020-03-10 | End: 2022-09-12

## 2022-09-15 ENCOUNTER — APPOINTMENT (OUTPATIENT)
Dept: ELECTROPHYSIOLOGY | Facility: CLINIC | Age: 67
End: 2022-09-15

## 2022-09-30 ENCOUNTER — EMERGENCY (EMERGENCY)
Facility: HOSPITAL | Age: 67
LOS: 1 days | Discharge: HOME | End: 2022-09-30
Attending: EMERGENCY MEDICINE | Admitting: EMERGENCY MEDICINE
Payer: COMMERCIAL

## 2022-09-30 VITALS
OXYGEN SATURATION: 97 % | SYSTOLIC BLOOD PRESSURE: 118 MMHG | DIASTOLIC BLOOD PRESSURE: 73 MMHG | HEART RATE: 70 BPM | RESPIRATION RATE: 16 BRPM

## 2022-09-30 VITALS
RESPIRATION RATE: 20 BRPM | HEIGHT: 65 IN | DIASTOLIC BLOOD PRESSURE: 72 MMHG | WEIGHT: 149.91 LBS | TEMPERATURE: 97 F | OXYGEN SATURATION: 99 % | SYSTOLIC BLOOD PRESSURE: 119 MMHG | HEART RATE: 76 BPM

## 2022-09-30 DIAGNOSIS — Z95.5 PRESENCE OF CORONARY ANGIOPLASTY IMPLANT AND GRAFT: Chronic | ICD-10-CM

## 2022-09-30 DIAGNOSIS — Z95.810 PRESENCE OF AUTOMATIC (IMPLANTABLE) CARDIAC DEFIBRILLATOR: Chronic | ICD-10-CM

## 2022-09-30 DIAGNOSIS — Z98.89 OTHER SPECIFIED POSTPROCEDURAL STATES: Chronic | ICD-10-CM

## 2022-09-30 PROCEDURE — 99283 EMERGENCY DEPT VISIT LOW MDM: CPT | Mod: 25

## 2022-09-30 PROCEDURE — 73130 X-RAY EXAM OF HAND: CPT

## 2022-09-30 PROCEDURE — 73130 X-RAY EXAM OF HAND: CPT | Mod: 26,LT

## 2022-09-30 PROCEDURE — 99283 EMERGENCY DEPT VISIT LOW MDM: CPT

## 2022-09-30 RX ORDER — IBUPROFEN 200 MG
600 TABLET ORAL ONCE
Refills: 0 | Status: COMPLETED | OUTPATIENT
Start: 2022-09-30 | End: 2022-09-30

## 2022-09-30 RX ADMIN — Medication 600 MILLIGRAM(S): at 02:18

## 2022-09-30 RX ADMIN — Medication 600 MILLIGRAM(S): at 01:29

## 2022-09-30 NOTE — ED ADULT TRIAGE NOTE - CHIEF COMPLAINT QUOTE
Patient complaining of left hand swelling since yesterday, denies trauma, no bruising noted at site, states lift heavy objects at job, takes aspirin 81 daily.

## 2022-09-30 NOTE — ED PROVIDER NOTE - CARE PROVIDER_API CALL
Suleiman Fernandez)  Orthopaedic Surgery; Surgery of the Hand  [address] [place] [address]  Phone: [phone]  Fax: [phone]  Follow Up Time: 4-6 Days

## 2022-09-30 NOTE — ED PROVIDER NOTE - NSFOLLOWUPINSTRUCTIONS_ED_ALL_ED_FT
Take Motrin 600 mg every 6 hours as needed for severe pain with food.  May take Tylenol as directed over-the-counter for less significant pain or swelling.  Wear cock up wrist splint for comfort.  Apply moist heat to hands prior to working.  Call hand surgeon for follow-up within the next 5 to 7 days for further care.  Return for fever redness or severe pain

## 2022-09-30 NOTE — ED ADULT NURSE NOTE - OBJECTIVE STATEMENT
Pt comes from triage. Pt reports left hand swelling intermittently. Pt states he Pt comes from triage. Pt reports left hand swelling intermittently. Pt states he injured his hand thirty years ago and it sometimes swells. Pt breathing easy, unlabored, no signs of distress. Pt ambulatory with a steady gait. Pt able to wiggle fingers, +cms.

## 2022-09-30 NOTE — ED PROVIDER NOTE - MUSCULOSKELETAL, MLM
Spine appears normal, range of motion is not limited,left hand with mild dorsal swelling and ttp, miild  pain with rom, oa appearing mcps, no redness

## 2022-09-30 NOTE — ED PROVIDER NOTE - CLINICAL SUMMARY MEDICAL DECISION MAKING FREE TEXT BOX
66-year-old gentleman with chronic left hand issues with increasing swelling and pain after working extensively likely due to osteoarthritis.  No signs of infection will check x-ray of the hand and if no acute pathology will put in cock up wrist splint to control movement we will have the patient take Tylenol or Motrin for pain and follow-up with hand surgery

## 2022-09-30 NOTE — ED PROVIDER NOTE - PATIENT PORTAL LINK FT
You can access the FollowMyHealth Patient Portal offered by [facility] by registering at the following website: [email or web address]. By joining [facility]’s FollowMyHealth portal, you will also be able to view your health information using other applications (apps) compatible with our system.

## 2022-10-06 ENCOUNTER — NON-APPOINTMENT (OUTPATIENT)
Age: 67
End: 2022-10-06

## 2022-10-06 ENCOUNTER — OUTPATIENT (OUTPATIENT)
Dept: OUTPATIENT SERVICES | Facility: HOSPITAL | Age: 67
LOS: 1 days | End: 2022-10-06
Payer: COMMERCIAL

## 2022-10-06 ENCOUNTER — APPOINTMENT (OUTPATIENT)
Dept: CARDIOLOGY | Facility: HOSPITAL | Age: 67
End: 2022-10-06

## 2022-10-06 VITALS
HEART RATE: 72 BPM | DIASTOLIC BLOOD PRESSURE: 72 MMHG | BODY MASS INDEX: 25.16 KG/M2 | SYSTOLIC BLOOD PRESSURE: 116 MMHG | OXYGEN SATURATION: 98 % | HEIGHT: 65 IN | WEIGHT: 151 LBS

## 2022-10-06 DIAGNOSIS — Z95.810 PRESENCE OF AUTOMATIC (IMPLANTABLE) CARDIAC DEFIBRILLATOR: Chronic | ICD-10-CM

## 2022-10-06 DIAGNOSIS — I25.10 ATHEROSCLEROTIC HEART DISEASE OF NATIVE CORONARY ARTERY WITHOUT ANGINA PECTORIS: ICD-10-CM

## 2022-10-06 DIAGNOSIS — Z98.89 OTHER SPECIFIED POSTPROCEDURAL STATES: Chronic | ICD-10-CM

## 2022-10-06 DIAGNOSIS — Z95.5 PRESENCE OF CORONARY ANGIOPLASTY IMPLANT AND GRAFT: Chronic | ICD-10-CM

## 2022-10-06 PROCEDURE — 93005 ELECTROCARDIOGRAM TRACING: CPT

## 2022-10-06 PROCEDURE — G0463: CPT

## 2022-10-06 NOTE — PHYSICAL EXAM
[Normal] : normal venous pressure, no carotid bruit [Normal S1, S2] : normal S1, S2 [Murmur] : murmur [Clear Lung Fields] : clear lung fields [No Edema] : no edema

## 2022-10-07 DIAGNOSIS — I42.9 CARDIOMYOPATHY, UNSPECIFIED: ICD-10-CM

## 2022-10-07 NOTE — HISTORY OF PRESENT ILLNESS
[FreeTextEntry1] : 66M of hx of STEMI 12/2013 s/p BMS to proximal LAD, and ICM HFrEF ~20% s/p ICD, gen change 2/2020, VT s/p VT ablation 2/2020, provoked RUE DVT on Eliquis here for f/u. 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 Has no issues going up and down stairs. Reports his only issue is a toxic work environment. No thoughts of hurting anyone or himself he just is going to look for a new job. Otherwise no LE edema, no palpitations, no dizziness, no chest pain, no PND. No issues with his current medications. \par \par TTE 6/3/20: EF 20%, MAC, Moderate MR, Moderately dilated LA 47, Severe segmental LV systolic function, moderate diastolic dysfunction \par \par Device interrogation 9/2022: Normal device function, 9.7 years, Several SVT episodes with rates of 190s, NSVT episodes longest lasting 6 seconds. Was Asymptomatic. \par \par PCP: Mauro Holliday [phone]\par \par Medications: \par Asa 81 \par Atorvastatin 80 mg qhs \par Entresto 24-26 \par Metoprolol 50 mg ER \par PPI \par Spironolactone 25 mg \par \par \par Social History:\par Former smoker (quit >40 years) \par Does not drink \par \par \par

## 2022-10-07 NOTE — REASON FOR VISIT
[FreeTextEntry1] : TIFFANIE ROSALINAS is being seen for structural heart and valve disease and coronary artery disease.

## 2022-11-22 ENCOUNTER — APPOINTMENT (OUTPATIENT)
Dept: ELECTROPHYSIOLOGY | Facility: CLINIC | Age: 67
End: 2022-11-22

## 2022-12-13 ENCOUNTER — OUTPATIENT (OUTPATIENT)
Dept: OUTPATIENT SERVICES | Facility: HOSPITAL | Age: 67
LOS: 1 days | End: 2022-12-13
Payer: COMMERCIAL

## 2022-12-13 ENCOUNTER — APPOINTMENT (OUTPATIENT)
Dept: CV DIAGNOSITCS | Facility: HOSPITAL | Age: 67
End: 2022-12-13

## 2022-12-13 DIAGNOSIS — Z95.5 PRESENCE OF CORONARY ANGIOPLASTY IMPLANT AND GRAFT: Chronic | ICD-10-CM

## 2022-12-13 DIAGNOSIS — I42.9 CARDIOMYOPATHY, UNSPECIFIED: ICD-10-CM

## 2022-12-13 DIAGNOSIS — Z98.89 OTHER SPECIFIED POSTPROCEDURAL STATES: Chronic | ICD-10-CM

## 2022-12-13 DIAGNOSIS — Z95.810 PRESENCE OF AUTOMATIC (IMPLANTABLE) CARDIAC DEFIBRILLATOR: Chronic | ICD-10-CM

## 2022-12-13 PROCEDURE — 93306 TTE W/DOPPLER COMPLETE: CPT | Mod: 26

## 2022-12-13 PROCEDURE — C8929: CPT

## 2022-12-15 ENCOUNTER — NON-APPOINTMENT (OUTPATIENT)
Age: 67
End: 2022-12-15

## 2022-12-15 ENCOUNTER — APPOINTMENT (OUTPATIENT)
Dept: ELECTROPHYSIOLOGY | Facility: CLINIC | Age: 67
End: 2022-12-15

## 2022-12-15 PROCEDURE — 93295 DEV INTERROG REMOTE 1/2/MLT: CPT

## 2022-12-15 PROCEDURE — 93296 REM INTERROG EVL PM/IDS: CPT

## 2023-01-12 ENCOUNTER — APPOINTMENT (OUTPATIENT)
Dept: CARDIOLOGY | Facility: HOSPITAL | Age: 68
End: 2023-01-12

## 2023-01-12 ENCOUNTER — OUTPATIENT (OUTPATIENT)
Dept: OUTPATIENT SERVICES | Facility: HOSPITAL | Age: 68
LOS: 1 days | End: 2023-01-12
Payer: COMMERCIAL

## 2023-01-12 VITALS
SYSTOLIC BLOOD PRESSURE: 117 MMHG | OXYGEN SATURATION: 96 % | HEIGHT: 65 IN | HEART RATE: 80 BPM | DIASTOLIC BLOOD PRESSURE: 78 MMHG

## 2023-01-12 DIAGNOSIS — Z95.810 PRESENCE OF AUTOMATIC (IMPLANTABLE) CARDIAC DEFIBRILLATOR: Chronic | ICD-10-CM

## 2023-01-12 DIAGNOSIS — Z95.5 PRESENCE OF CORONARY ANGIOPLASTY IMPLANT AND GRAFT: Chronic | ICD-10-CM

## 2023-01-12 DIAGNOSIS — Z98.89 OTHER SPECIFIED POSTPROCEDURAL STATES: Chronic | ICD-10-CM

## 2023-01-12 DIAGNOSIS — I25.10 ATHEROSCLEROTIC HEART DISEASE OF NATIVE CORONARY ARTERY WITHOUT ANGINA PECTORIS: ICD-10-CM

## 2023-01-12 PROCEDURE — 93005 ELECTROCARDIOGRAM TRACING: CPT

## 2023-01-12 PROCEDURE — G0463: CPT

## 2023-01-12 NOTE — REASON FOR VISIT
[Symptom and Test Evaluation] : symptom and test evaluation [Cardiac Failure] : cardiac failure [Arrhythmia/ECG Abnorrmalities] : arrhythmia/ECG abnormalities [Structural Heart and Valve Disease] : structural heart and valve disease

## 2023-01-13 ENCOUNTER — NON-APPOINTMENT (OUTPATIENT)
Age: 68
End: 2023-01-13

## 2023-01-13 DIAGNOSIS — I50.20 UNSPECIFIED SYSTOLIC (CONGESTIVE) HEART FAILURE: ICD-10-CM

## 2023-01-13 DIAGNOSIS — I34.0 NONRHEUMATIC MITRAL (VALVE) INSUFFICIENCY: ICD-10-CM

## 2023-01-13 NOTE — HISTORY OF PRESENT ILLNESS
[FreeTextEntry1] : 66M of hx of STEMI 12/2013 s/p BMS to proximal LAD, and ICM HFrEF ~20% s/p ICD, gen change 2/2020, VT s/p VT ablation 2/2020, provoked RUE DVT on Eliquis here for f/u. 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 Has no issues going up and down stairs. . Otherwise no LE edema, no palpitations, no dizziness, no chest pain, no PND. No issues with his current medications. Had recent ECHO done (documented below)\par \par TTE 6/3/20: EF 20%, MAC, Moderate MR, Moderately dilated LA 47, Severe segmental LV systolic function, moderate diastolic dysfunction \par TTE 12/9/22: LVEF 36%, LVIDd 5.7, Mod-Severe MR, Mild-Mod AR, Mod dilated LA volume index 42 cc/m2 Severe segmental LV systolic function with akinesis of mid and apical septum, anteroseptum, anterior wall, apex, distal inferolateral wall, inferoapical dyskinesis, RVSP 31, small pericardial effusion \par \par Device interrogation 9/2022: Normal device function, 9.7 years, Several SVT episodes with rates of 190s, NSVT episodes longest lasting 6 seconds. Was Asymptomatic. \par Device interrogation 12/1022: Few episodes NSVT, battery life 9.4 years, No significant RV pacing, no further SVT \par \par PCP: Mauro Holliday [phone]\par \par Medications: \par Asa 81 \par Atorvastatin 80 mg qhs \par Entresto 24-26 \par Metoprolol 50 mg ER \par PPI \par Spironolactone 25 mg \par \par \par Social History:\par Former smoker (quit >40 years) \par Does not drink \par \par On further discussion with the patient and chart review he reportedly did not tolerate increases in metoprolol even though he has a ICD and has not tried increasing entresto. Given persistent low EF will increase dose of entresto.

## 2023-02-01 NOTE — ED PROVIDER NOTE - EYES NEGATIVE STATEMENT, MLM
no discharge, no irritation, no pain, no redness, and no visual changes. Minocycline Counseling: Patient advised regarding possible photosensitivity and discoloration of the teeth, skin, lips, tongue and gums.  Patient instructed to avoid sunlight, if possible.  When exposed to sunlight, patients should wear protective clothing, sunglasses, and sunscreen.  The patient was instructed to call the office immediately if the following severe adverse effects occur:  hearing changes, easy bruising/bleeding, severe headache, or vision changes.  The patient verbalized understanding of the proper use and possible adverse effects of minocycline.  All of the patient's questions and concerns were addressed.

## 2023-03-07 NOTE — ED ADULT NURSE NOTE - NS TRANSFER PATIENT BELONGINGS
Detail Level: Detailed
Quality 110: Preventive Care And Screening: Influenza Immunization: Influenza Immunization Administered during Influenza season
Clothing

## 2023-03-16 ENCOUNTER — APPOINTMENT (OUTPATIENT)
Dept: ELECTROPHYSIOLOGY | Facility: CLINIC | Age: 68
End: 2023-03-16

## 2023-03-16 ENCOUNTER — OUTPATIENT (OUTPATIENT)
Dept: OUTPATIENT SERVICES | Facility: HOSPITAL | Age: 68
LOS: 1 days | End: 2023-03-16
Payer: COMMERCIAL

## 2023-03-16 ENCOUNTER — APPOINTMENT (OUTPATIENT)
Dept: CARDIOLOGY | Facility: HOSPITAL | Age: 68
End: 2023-03-16

## 2023-03-16 VITALS
DIASTOLIC BLOOD PRESSURE: 68 MMHG | HEIGHT: 65 IN | OXYGEN SATURATION: 99 % | BODY MASS INDEX: 26.66 KG/M2 | WEIGHT: 160 LBS | HEART RATE: 69 BPM | SYSTOLIC BLOOD PRESSURE: 106 MMHG

## 2023-03-16 DIAGNOSIS — Z95.5 PRESENCE OF CORONARY ANGIOPLASTY IMPLANT AND GRAFT: Chronic | ICD-10-CM

## 2023-03-16 DIAGNOSIS — I34.0 NONRHEUMATIC MITRAL (VALVE) INSUFFICIENCY: ICD-10-CM

## 2023-03-16 DIAGNOSIS — E34.9 ENDOCRINE DISORDER, UNSPECIFIED: ICD-10-CM

## 2023-03-16 DIAGNOSIS — I25.10 ATHEROSCLEROTIC HEART DISEASE OF NATIVE CORONARY ARTERY WITHOUT ANGINA PECTORIS: ICD-10-CM

## 2023-03-16 PROCEDURE — 93005 ELECTROCARDIOGRAM TRACING: CPT

## 2023-03-16 PROCEDURE — G0463: CPT

## 2023-03-17 PROBLEM — E34.9 TESTOSTERONE INSUFFICIENCY: Status: ACTIVE | Noted: 2023-03-16

## 2023-03-17 PROBLEM — I34.0 NONRHEUMATIC MITRAL VALVE REGURGITATION: Status: ACTIVE | Noted: 2023-01-13

## 2023-03-17 RX ORDER — SPIRONOLACTONE 25 MG/1
25 TABLET ORAL DAILY
Qty: 90 | Refills: 1 | Status: DISCONTINUED | COMMUNITY
Start: 2020-06-03 | End: 2023-03-17

## 2023-03-20 DIAGNOSIS — I50.20 UNSPECIFIED SYSTOLIC (CONGESTIVE) HEART FAILURE: ICD-10-CM

## 2023-03-20 DIAGNOSIS — I34.0 NONRHEUMATIC MITRAL (VALVE) INSUFFICIENCY: ICD-10-CM

## 2023-03-20 DIAGNOSIS — E34.9 ENDOCRINE DISORDER, UNSPECIFIED: ICD-10-CM

## 2023-03-21 NOTE — HISTORY OF PRESENT ILLNESS
[FreeTextEntry1] : 66M of hx of STEMI 12/2013 s/p BMS to proximal LAD, and ICM HFrEF ~20% s/p ICD, gen change 2/2020, VT s/p VT ablation 2/2020, provoked RUE DVT on Eliquis here for f/u. 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 Has no issues going up and down stairs. . Otherwise no LE edema, no palpitations, no dizziness, no chest pain, no PND. No issues with his current medications. Had recent ECHO done (documented below)\par \par TTE 6/3/20: EF 20%, MAC, Moderate MR, Moderately dilated LA 47, Severe segmental LV systolic function, moderate diastolic dysfunction \par TTE 12/9/22: LVEF 36%, LVIDd 5.7, Mod-Severe MR, Mild-Mod AR, Mod dilated LA volume index 42 cc/m2 Severe segmental LV systolic function with akinesis of mid and apical septum, anteroseptum, anterior wall, apex, distal inferolateral wall, inferoapical dyskinesis, RVSP 31, small pericardial effusion \par \par Device interrogation 9/2022: Normal device function, 9.7 years, Several SVT episodes with rates of 190s, NSVT episodes longest lasting 6 seconds. Was Asymptomatic. \par Device interrogation 12/1022: Few episodes NSVT, battery life 9.4 years, No significant RV pacing, no further SVT \par \par PCP: Mauro Holliday [phone]\par \par \par \par Patient tolerating up-titration of GDMT (last increased entresto) K and Cr obtained by PCP showed no change in after medication changes. Patient brought in BPs after increasing entresto with SBP in the 100s-110s/60s-70s with no dizziness, lightheadedness, palpitations. He also brought in lab work 1.5 weeks after increasing the dose with no abnormalities. \par . \par \par 2/23"\par Na 138, k 4.8, Creatinine 1.08, BUN 30 , EGFR 75, BICARB 27\par 1/23: Choelsterol 91, HDL 43, TG 49, LDL 34\par \par Medications: \par Asa 81 \par Atorvastatin 80 mg qhs \par Entresto 24-26 \par Metoprolol 50 mg ER \par PPI \par Spironolactone 25 mg \par \par \par Social History:\par Former smoker (quit >40 years) \par Does not drink \par \par

## 2023-04-05 ENCOUNTER — APPOINTMENT (OUTPATIENT)
Dept: GASTROENTEROLOGY | Facility: CLINIC | Age: 68
End: 2023-04-05

## 2023-04-10 ENCOUNTER — APPOINTMENT (OUTPATIENT)
Dept: ELECTROPHYSIOLOGY | Facility: CLINIC | Age: 68
End: 2023-04-10

## 2023-04-16 NOTE — ED ADULT NURSE NOTE - BREATHING

## 2023-04-25 ENCOUNTER — APPOINTMENT (OUTPATIENT)
Dept: UROLOGY | Facility: CLINIC | Age: 68
End: 2023-04-25
Payer: MEDICARE

## 2023-04-25 DIAGNOSIS — I10 ESSENTIAL (PRIMARY) HYPERTENSION: ICD-10-CM

## 2023-04-25 PROCEDURE — 99204 OFFICE O/P NEW MOD 45 MIN: CPT

## 2023-04-25 RX ORDER — EPLERENONE 50 MG/1
50 TABLET, COATED ORAL DAILY
Qty: 30 | Refills: 5 | Status: DISCONTINUED | COMMUNITY
Start: 2023-03-17 | End: 2023-04-25

## 2023-05-04 ENCOUNTER — NON-APPOINTMENT (OUTPATIENT)
Age: 68
End: 2023-05-04

## 2023-05-08 ENCOUNTER — EMERGENCY (EMERGENCY)
Facility: HOSPITAL | Age: 68
LOS: 1 days | Discharge: HOME | End: 2023-05-08
Attending: EMERGENCY MEDICINE | Admitting: EMERGENCY MEDICINE
Payer: COMMERCIAL

## 2023-05-08 VITALS
HEART RATE: 93 BPM | OXYGEN SATURATION: 96 % | RESPIRATION RATE: 20 BRPM | DIASTOLIC BLOOD PRESSURE: 69 MMHG | WEIGHT: 160.06 LBS | SYSTOLIC BLOOD PRESSURE: 111 MMHG | HEIGHT: 65 IN | TEMPERATURE: 98 F

## 2023-05-08 DIAGNOSIS — Z98.89 OTHER SPECIFIED POSTPROCEDURAL STATES: Chronic | ICD-10-CM

## 2023-05-08 DIAGNOSIS — Z95.5 PRESENCE OF CORONARY ANGIOPLASTY IMPLANT AND GRAFT: Chronic | ICD-10-CM

## 2023-05-08 DIAGNOSIS — Z95.810 PRESENCE OF AUTOMATIC (IMPLANTABLE) CARDIAC DEFIBRILLATOR: Chronic | ICD-10-CM

## 2023-05-08 PROCEDURE — 93010 ELECTROCARDIOGRAM REPORT: CPT

## 2023-05-08 PROCEDURE — 87798 DETECT AGENT NOS DNA AMP: CPT

## 2023-05-08 PROCEDURE — 99284 EMERGENCY DEPT VISIT MOD MDM: CPT

## 2023-05-08 PROCEDURE — 93005 ELECTROCARDIOGRAM TRACING: CPT

## 2023-05-08 PROCEDURE — 99283 EMERGENCY DEPT VISIT LOW MDM: CPT

## 2023-05-08 PROCEDURE — 87651 STREP A DNA AMP PROBE: CPT

## 2023-05-08 RX ORDER — IBUPROFEN 200 MG
600 TABLET ORAL ONCE
Refills: 0 | Status: COMPLETED | OUTPATIENT
Start: 2023-05-08 | End: 2023-05-08

## 2023-05-08 RX ADMIN — Medication 600 MILLIGRAM(S): at 23:30

## 2023-05-08 NOTE — ED PROVIDER NOTE - PHYSICAL EXAMINATION
Well-developed well-nourished male no apparent distress.  HEENT exam reveals no G/F/R.  There is moderate posterior oropharyngeal cobblestoning.  With no enlarged tonsils.  Uvula is adherent to the posterior oropharynx.  The nasopharynx is clear without swelling or erythema.  There is no sinus pain or pressure.  The neck has no lymphadenopathy.  Supple.  Lung exam and heart exam are without significant findings.  Abdominal exam soft nontender no guarding or rebound.  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  Palpation of the muscles of the neck revealed mild paraspinal tenderness.  This was agreed upon by the patient.  Skin exam is unremarkable.  Neurologic exam is normal.  No other lymphadenopathy.

## 2023-05-08 NOTE — ED ADULT TRIAGE NOTE - CHIEF COMPLAINT QUOTE
Patient complaining of neck and throat pain this morning when he woke up. Denies nausea vomiting diarrhea, denies headache, lightheadedness, dizziness.

## 2023-05-08 NOTE — ED PROVIDER NOTE - ST/T WAVE
pt has ns st changes cw prior ekg findings, lvh lafb  q waves v1-v3 jpoint elevation all seen on ekg nov 11 2021

## 2023-05-08 NOTE — ED PROVIDER NOTE - NSFOLLOWUPINSTRUCTIONS_ED_ALL_ED_FT
Follow-up with your primary care physician  Symptomatic therapy for your throat discomfort including gargles, tea with honey, lozenges.  Follow-up with your primary cardiologist.  If symptoms worsen, for fevers chills shortness of breath or any concerns call 911 go to the nearest emergency room.  consider allergy medications  flonase nasal spray for post nasal drip or claritin or other antiallergy medication  follow up with ENT on call Dr. Evans  Sore Throat  When you have a sore throat, your throat may feel:  Tender.  Burning.  Irritated.  Scratchy.  Painful when you swallow.  Painful when you talk.  Many things can cause a sore throat, such as:  An infection.  Allergies.  Dry air.  Smoke or pollution.  Radiation treatment for cancer.  Gastroesophageal reflux disease (GERD).  A tumor.  A sore throat can be the first sign of another sickness. It can happen with other problems, like:  Coughing.  Sneezing.  Fever.  Swelling of the glands in the neck.  Most sore throats go away without treatment.    Follow these instructions at home:  Juice, water, and tea.   A do not smoke cigarettes sign.   Medicines    Take over-the-counter and prescription medicines only as told by your doctor.  Children often get sore throats. Do not give your child aspirin.  Use throat sprays to soothe your throat as told by your health care provider.  Managing pain    To help with pain:  Sip warm liquids, such as broth, herbal tea, or warm water.  Eat or drink cold or frozen liquids, such as frozen ice pops.  Rinse your mouth (gargle) with a salt water mixture 3–4 times a day or as needed.  To make salt water, dissolve ½–1 tsp (3–6 g) of salt in 1 cup (237 mL) of warm water.  Do not swallow this mixture.  Suck on hard candy or throat lozenges.  Put a cool-mist humidifier in your bedroom at night.  Sit in the bathroom with the door closed for 5–10 minutes while you run hot water in the shower.  General instructions    Do not smoke or use any products that contain nicotine or tobacco. If you need help quitting, ask your doctor.  Get plenty of rest.  Drink enough fluid to keep your pee (urine) pale yellow.  Wash your hands often for at least 20 seconds with soap and water. If soap and water are not available, use hand .  Contact a doctor if:  You have a fever for more than 2–3 days.  You keep having symptoms for more than 2–3 days.  Your throat does not get better in 7 days.  You have a fever and your symptoms suddenly get worse.  Your child who is 3 months to 3 years old has a temperature of 102.2°F (39°C) or higher.  Get help right away if:  You have trouble breathing.  You cannot swallow fluids, soft foods, or your spit.  You have swelling in your throat or neck that gets worse.  You feel like you may vomit (nauseous) and this feeling lasts a long time.  You cannot stop vomiting.  These symptoms may be an emergency. Get help right away. Call your local emergency services (911 in the [place]  Do not wait to see if the symptoms will go away.  Do not drive yourself to the hospital.  Summary  A sore throat is a painful, burning, irritated, or scratchy throat. Many things can cause a sore throat.  Take over-the-counter medicines only as told by your doctor.  Get plenty of rest.  Drink enough fluid to keep your pee (urine) pale yellow.  Contact a doctor if your symptoms get worse or your sore throat does not get better within 7 days.  This information is not intended to replace advice given to you by your health care provider. Make sure you discuss any questions you have with your health care provider.

## 2023-05-08 NOTE — ED PROVIDER NOTE - PATIENT PORTAL LINK FT
You can access the FollowMyHealth Patient Portal offered by [facility] by registering at the following website: [email or web address]. By joining [facility]’s FollowMyHealth portal, you will also be able to view your health information using other applications (apps) compatible with our system.

## 2023-05-08 NOTE — ED PROVIDER NOTE - OBJECTIVE STATEMENT
Patient is a 67-year-old male who has a history of cardiac disease with an AICD and stent.  Hypertension and hyperlipidemia.  Primary care physician is Dr. Kellie Bear.  Cardiologist is the [facility] cardiology clinic.  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  In states that for the past 24 hours he has had sore throat pain.  It hurts when he swallows.  Fearing he may have trouble breathing or difficulty while he sleeping he came to the emergency room for evaluation.  He also expressed a little trepidation because the night before he took half of a Viagra.  This was his first use of this medication.  He denies tobacco or alcohol.  He is denies travel or trauma.  Denies any fevers or chills.  He denies ill contacts.  States he is never really had this difficulty with sore throat before and he was quite concerned.  He denies any chest pain or shortness of breath.  He denies any cardiac symptoms.  Denies any edema.  He denies any exposures.

## 2023-05-08 NOTE — ED PROVIDER NOTE - CLINICAL SUMMARY MEDICAL DECISION MAKING FREE TEXT BOX
Patient is a 67-year-old male with a history of cardiac disease presents to the emergency room with a sore throat.  He is very worried that he may not be able to breathe at night because of a sore throat so he came to the emergency room.  He has no stridor.  He has no swelling or edema.  He does have postnasal drip with adherence of the uvula to the posterior oropharynx.  Consistent with postnasal drip, either allergic or viral.  No evidence of erythema.  No tonsillitis.  No sinus pain or pressure.  He has neck is supple with full range of motion does have paraspinal muscle tenderness.    We will obtain an EKG, strep test.  Ibuprofen for discomfort.  Counseled the patient on his postnasal drip and need for follow-up with ENT and primary care.  This chart was made with dictation software and may contain typographical errors.

## 2023-05-08 NOTE — ED PROVIDER NOTE - CARE PROVIDER_API CALL
Kellie Bear)  Internal Medicine  [address] [place] [address]  Phone: [phone]  Fax: [phone]  Follow Up Time:     Mick Evans)  Otolaryngology  [address] [place] [address]  Phone: [phone]  Fax: [phone]  Follow Up Time:

## 2023-05-08 NOTE — ED ADULT NURSE NOTE - OBJECTIVE STATEMENT
pt. is AOX4. complaints of neck and throat pain.  Denies fall/HA/cough. Denies n/v. denies fevers.  Pt stated that he started to feel pain in both placed since last night and feels like his tonsils are swollen. Pending radiology and lab results.

## 2023-05-09 ENCOUNTER — OUTPATIENT (OUTPATIENT)
Dept: OUTPATIENT SERVICES | Facility: HOSPITAL | Age: 68
LOS: 1 days | End: 2023-05-09
Payer: COMMERCIAL

## 2023-05-09 ENCOUNTER — APPOINTMENT (OUTPATIENT)
Dept: ULTRASOUND IMAGING | Facility: CLINIC | Age: 68
End: 2023-05-09
Payer: MEDICARE

## 2023-05-09 VITALS
TEMPERATURE: 98 F | HEART RATE: 80 BPM | RESPIRATION RATE: 19 BRPM | DIASTOLIC BLOOD PRESSURE: 71 MMHG | SYSTOLIC BLOOD PRESSURE: 109 MMHG | OXYGEN SATURATION: 96 %

## 2023-05-09 DIAGNOSIS — N50.89 OTHER SPECIFIED DISORDERS OF THE MALE GENITAL ORGANS: ICD-10-CM

## 2023-05-09 DIAGNOSIS — Z95.5 PRESENCE OF CORONARY ANGIOPLASTY IMPLANT AND GRAFT: Chronic | ICD-10-CM

## 2023-05-09 DIAGNOSIS — Z98.89 OTHER SPECIFIED POSTPROCEDURAL STATES: Chronic | ICD-10-CM

## 2023-05-09 LAB — S PYO DNA THROAT QL NAA+PROBE: SIGNIFICANT CHANGE UP

## 2023-05-09 PROCEDURE — 93975 VASCULAR STUDY: CPT | Mod: 26

## 2023-05-09 PROCEDURE — 93975 VASCULAR STUDY: CPT

## 2023-05-12 NOTE — ASSESSMENT
[FreeTextEntry1] : 67 y.o. M with multiple cardiac comorbities who presents with:\par \par #ED\par - Significant cardiac comorbidities - reached out to Dr Dial (cardiology) for clearance for PDE-5 inhibitor use\par - If clears - will recommend patient try sildenafil. Discussed proper use, timing, side effects\par - Discuss testosterone screening. Discussed CHF is contraindication to TRT so even if T is low, would not be candidate for replacement, therefore will not test\par \par #Palpable scrotal lesions\par - Possible epididymal cysts\par - Bedside scrotal US with possible b/l calcifications within testicles - formal US

## 2023-05-12 NOTE — LETTER BODY
[Dear  ___] : Dear  [unfilled], [Consult Letter:] : I had the pleasure of evaluating your patient, [unfilled]. [Please see my note below.] : Please see my note below. [Consult Closing:] : Thank you very much for allowing me to participate in the care of this patient.  If you have any questions, please do not hesitate to contact me. [Sincerely,] : Sincerely, [FreeTextEntry2] : Kellie Bear MD\par [address], [address]\par [place], [place]\par [address] [FreeTextEntry3] : Herve Gonzalez MD\par [facility]\par [address] [place] [address], [address]\par [place], [place] [address]\par p: [phone]\par f: [phone]

## 2023-05-12 NOTE — HISTORY OF PRESENT ILLNESS
[FreeTextEntry1] : TIFFANIE MARKS is a 67 year M who presents today as a new patient evaluation for ED.\par \par New girlfriend as of 2 months ago. Has been trying to have intercourse - reports erection is firm but not firm enough for penetrative intercourse.\par Was prescribed sildenafil 100mg by PCP - has yet to try this.\par Never had erectile issues in the past, but does report he has not been sexually active for many years. \par \par Significant cardiac comorbidities: \par Hx of STEMI 12/2013 s/p BMS to proximal LAD, and ICM HFrEF ~20% s/p ICD, VT s/p VT ablation 2/2020, provoked RUE DVT on Eliquis here for f/u.\par \par Minimal LUTS - only reports nocturia x 1. No gross hematuria, dysuria, UTIs\par AUASS: 1 (0/0/0/0/0/0/1)\par \par PSA 9/2022 - 1.15\par \par Anticoagulation: Aspirin\par All: None\par Social: Works as \par PMHx: CAD s/p STEMI, CHF, VT s/p ablation, ICD placement, DVT, HLD\par PSHx: ICD\par FHx: No  malignancy\par Labs: PSA as above \par \par Denies gross hematuria, flank pain, fevers, chills, nausea, vomiting.

## 2023-05-12 NOTE — PHYSICAL EXAM
[Normal Appearance] : normal appearance [Well Groomed] : well groomed [Edema] : no peripheral edema [Exaggerated Use Of Accessory Muscles For Inspiration] : no accessory muscle use [Abdomen Tenderness] : non-tender [Costovertebral Angle Tenderness] : no ~M costovertebral angle tenderness [Urethral Meatus] : meatus normal [FreeTextEntry1] : LT: smooth testicle, enlarged para-testicular mass (likely engorged epididymis). RT: Large cystic structure superior to right testicle - testicle is smooth

## 2023-05-18 ENCOUNTER — APPOINTMENT (OUTPATIENT)
Dept: CARDIOLOGY | Facility: HOSPITAL | Age: 68
End: 2023-05-18

## 2023-05-23 ENCOUNTER — APPOINTMENT (OUTPATIENT)
Dept: UROLOGY | Facility: CLINIC | Age: 68
End: 2023-05-23

## 2023-06-22 ENCOUNTER — APPOINTMENT (OUTPATIENT)
Dept: UROLOGY | Facility: CLINIC | Age: 68
End: 2023-06-22
Payer: MEDICARE

## 2023-06-22 VITALS
BODY MASS INDEX: 26.66 KG/M2 | OXYGEN SATURATION: 97 % | DIASTOLIC BLOOD PRESSURE: 65 MMHG | HEIGHT: 65 IN | WEIGHT: 160 LBS | HEART RATE: 74 BPM | SYSTOLIC BLOOD PRESSURE: 107 MMHG

## 2023-06-22 PROCEDURE — 99213 OFFICE O/P EST LOW 20 MIN: CPT

## 2023-06-22 NOTE — HISTORY OF PRESENT ILLNESS
[FreeTextEntry1] : 67 presents as a return patient.\par \par #ED\par Significant cardiac comorbidities: \par Hx of STEMI 12/2013 s/p BMS to proximal LAD, and ICM HFrEF ~20% s/p ICD, VT s/p VT ablation 2/2020, provoked RUE DVT on Eliquis here for f/u.\par Cardiologist messaged - approved sildenafil use\par Started on this at last visit - working well\par Able to ejaculate with masturbation, not with sex.\par \par #Scrotal lesion\par Scrotal lesion palpated on exam\par Formal US with epididymal cysts\par \par Minimal LUTS - only reports nocturia x 1. No gross hematuria, dysuria, UTIs\par AUASS: 1 (0/0/0/0/0/0/1)\par \par PSA 9/2022 - 1.15

## 2023-06-22 NOTE — ASSESSMENT
[FreeTextEntry1] : 67 y.o. M with \par \par #ED\par - Content on sildenafil\par - Discussed possible psychological component to ejaculatory symptoms given that he is able to ejaculate with masturbation and not with intercourse.  Recommend referral to sex therapist\par \par #Epididymal cysts\par - Not bothered\par - Scrotal US reviewed\par - Did discuss surgical removal is a possibility, however he is minimally bothered by these.  We will continue to observe

## 2023-07-09 ENCOUNTER — EMERGENCY (EMERGENCY)
Facility: HOSPITAL | Age: 68
LOS: 1 days | Discharge: HOME | End: 2023-07-09
Attending: EMERGENCY MEDICINE | Admitting: EMERGENCY MEDICINE
Payer: COMMERCIAL

## 2023-07-09 VITALS
RESPIRATION RATE: 16 BRPM | HEIGHT: 68 IN | SYSTOLIC BLOOD PRESSURE: 116 MMHG | OXYGEN SATURATION: 97 % | DIASTOLIC BLOOD PRESSURE: 75 MMHG | HEART RATE: 74 BPM | WEIGHT: 160.06 LBS | TEMPERATURE: 98 F

## 2023-07-09 VITALS
HEART RATE: 70 BPM | DIASTOLIC BLOOD PRESSURE: 70 MMHG | SYSTOLIC BLOOD PRESSURE: 112 MMHG | RESPIRATION RATE: 20 BRPM | OXYGEN SATURATION: 96 % | TEMPERATURE: 98 F

## 2023-07-09 DIAGNOSIS — Z95.5 PRESENCE OF CORONARY ANGIOPLASTY IMPLANT AND GRAFT: Chronic | ICD-10-CM

## 2023-07-09 DIAGNOSIS — Z98.89 OTHER SPECIFIED POSTPROCEDURAL STATES: Chronic | ICD-10-CM

## 2023-07-09 DIAGNOSIS — Z95.810 PRESENCE OF AUTOMATIC (IMPLANTABLE) CARDIAC DEFIBRILLATOR: Chronic | ICD-10-CM

## 2023-07-09 LAB
ALBUMIN SERPL ELPH-MCNC: 3 G/DL — LOW (ref 3.3–5)
ALP SERPL-CCNC: 60 U/L — SIGNIFICANT CHANGE UP (ref 40–120)
ALT FLD-CCNC: 29 U/L — SIGNIFICANT CHANGE UP (ref 12–78)
ANION GAP SERPL CALC-SCNC: 3 MMOL/L — LOW (ref 5–17)
APTT BLD: 30.9 SEC — SIGNIFICANT CHANGE UP (ref 27.5–35.5)
AST SERPL-CCNC: 22 U/L — SIGNIFICANT CHANGE UP (ref 15–37)
BASOPHILS # BLD AUTO: 0.02 K/UL — SIGNIFICANT CHANGE UP (ref 0–0.2)
BASOPHILS NFR BLD AUTO: 0.4 % — SIGNIFICANT CHANGE UP (ref 0–2)
BILIRUB SERPL-MCNC: 0.7 MG/DL — SIGNIFICANT CHANGE UP (ref 0.2–1.2)
BUN SERPL-MCNC: 27 MG/DL — HIGH (ref 7–23)
CALCIUM SERPL-MCNC: 8.3 MG/DL — LOW (ref 8.5–10.1)
CHLORIDE SERPL-SCNC: 109 MMOL/L — HIGH (ref 96–108)
CK MB CFR SERPL CALC: 1.7 NG/ML — SIGNIFICANT CHANGE UP (ref 0–3.6)
CO2 SERPL-SCNC: 28 MMOL/L — SIGNIFICANT CHANGE UP (ref 22–31)
CREAT SERPL-MCNC: 1 MG/DL — SIGNIFICANT CHANGE UP (ref 0.5–1.3)
EGFR: 82 ML/MIN/1.73M2 — SIGNIFICANT CHANGE UP
EOSINOPHIL # BLD AUTO: 0.1 K/UL — SIGNIFICANT CHANGE UP (ref 0–0.5)
EOSINOPHIL NFR BLD AUTO: 1.9 % — SIGNIFICANT CHANGE UP (ref 0–6)
GLUCOSE SERPL-MCNC: 83 MG/DL — SIGNIFICANT CHANGE UP (ref 70–99)
HCT VFR BLD CALC: 43.4 % — SIGNIFICANT CHANGE UP (ref 39–50)
HGB BLD-MCNC: 14 G/DL — SIGNIFICANT CHANGE UP (ref 13–17)
IMM GRANULOCYTES NFR BLD AUTO: 0.4 % — SIGNIFICANT CHANGE UP (ref 0–0.9)
INR BLD: 1.1 RATIO — SIGNIFICANT CHANGE UP (ref 0.88–1.16)
LYMPHOCYTES # BLD AUTO: 1.02 K/UL — SIGNIFICANT CHANGE UP (ref 1–3.3)
LYMPHOCYTES # BLD AUTO: 19.8 % — SIGNIFICANT CHANGE UP (ref 13–44)
MAGNESIUM SERPL-MCNC: 2.1 MG/DL — SIGNIFICANT CHANGE UP (ref 1.6–2.6)
MCHC RBC-ENTMCNC: 29.8 PG — SIGNIFICANT CHANGE UP (ref 27–34)
MCHC RBC-ENTMCNC: 32.3 GM/DL — SIGNIFICANT CHANGE UP (ref 32–36)
MCV RBC AUTO: 92.3 FL — SIGNIFICANT CHANGE UP (ref 80–100)
MONOCYTES # BLD AUTO: 0.87 K/UL — SIGNIFICANT CHANGE UP (ref 0–0.9)
MONOCYTES NFR BLD AUTO: 16.9 % — HIGH (ref 2–14)
NEUTROPHILS # BLD AUTO: 3.12 K/UL — SIGNIFICANT CHANGE UP (ref 1.8–7.4)
NEUTROPHILS NFR BLD AUTO: 60.6 % — SIGNIFICANT CHANGE UP (ref 43–77)
NRBC # BLD: 0 /100 WBCS — SIGNIFICANT CHANGE UP (ref 0–0)
PLATELET # BLD AUTO: 119 K/UL — LOW (ref 150–400)
POTASSIUM SERPL-MCNC: 4.1 MMOL/L — SIGNIFICANT CHANGE UP (ref 3.5–5.3)
POTASSIUM SERPL-SCNC: 4.1 MMOL/L — SIGNIFICANT CHANGE UP (ref 3.5–5.3)
PROT SERPL-MCNC: 6.3 G/DL — SIGNIFICANT CHANGE UP (ref 6–8.3)
PROTHROM AB SERPL-ACNC: 12.9 SEC — SIGNIFICANT CHANGE UP (ref 10.5–13.4)
RBC # BLD: 4.7 M/UL — SIGNIFICANT CHANGE UP (ref 4.2–5.8)
RBC # FLD: 14.6 % — HIGH (ref 10.3–14.5)
SODIUM SERPL-SCNC: 140 MMOL/L — SIGNIFICANT CHANGE UP (ref 135–145)
TROPONIN I, HIGH SENSITIVITY RESULT: 9.9 NG/L — SIGNIFICANT CHANGE UP
WBC # BLD: 5.15 K/UL — SIGNIFICANT CHANGE UP (ref 3.8–10.5)
WBC # FLD AUTO: 5.15 K/UL — SIGNIFICANT CHANGE UP (ref 3.8–10.5)

## 2023-07-09 PROCEDURE — 96360 HYDRATION IV INFUSION INIT: CPT

## 2023-07-09 PROCEDURE — 80053 COMPREHEN METABOLIC PANEL: CPT

## 2023-07-09 PROCEDURE — 84484 ASSAY OF TROPONIN QUANT: CPT

## 2023-07-09 PROCEDURE — 83735 ASSAY OF MAGNESIUM: CPT

## 2023-07-09 PROCEDURE — 85025 COMPLETE CBC W/AUTO DIFF WBC: CPT

## 2023-07-09 PROCEDURE — 36415 COLL VENOUS BLD VENIPUNCTURE: CPT

## 2023-07-09 PROCEDURE — 93005 ELECTROCARDIOGRAM TRACING: CPT

## 2023-07-09 PROCEDURE — 85610 PROTHROMBIN TIME: CPT

## 2023-07-09 PROCEDURE — 85730 THROMBOPLASTIN TIME PARTIAL: CPT

## 2023-07-09 PROCEDURE — 99285 EMERGENCY DEPT VISIT HI MDM: CPT

## 2023-07-09 PROCEDURE — 93010 ELECTROCARDIOGRAM REPORT: CPT

## 2023-07-09 PROCEDURE — 82553 CREATINE MB FRACTION: CPT

## 2023-07-09 PROCEDURE — 99283 EMERGENCY DEPT VISIT LOW MDM: CPT | Mod: 25

## 2023-07-09 RX ORDER — SODIUM CHLORIDE 9 MG/ML
500 INJECTION INTRAMUSCULAR; INTRAVENOUS; SUBCUTANEOUS ONCE
Refills: 0 | Status: COMPLETED | OUTPATIENT
Start: 2023-07-09 | End: 2023-07-09

## 2023-07-09 RX ADMIN — SODIUM CHLORIDE 500 MILLILITER(S): 9 INJECTION INTRAMUSCULAR; INTRAVENOUS; SUBCUTANEOUS at 21:46

## 2023-07-09 RX ADMIN — SODIUM CHLORIDE 500 MILLILITER(S): 9 INJECTION INTRAMUSCULAR; INTRAVENOUS; SUBCUTANEOUS at 22:24

## 2023-07-09 NOTE — ED ADULT TRIAGE NOTE - CHIEF COMPLAINT QUOTE
Patient is a 68yo male complaining of lightheadedness today starting this morning Patient states he thinks it due to the Viagra he took last night Patient denies nausea vomiting diarrhea fever. Patient had cardiac stents Patient has taken Viagra about 10 times before with no lightheadedness

## 2023-07-09 NOTE — ED ADULT NURSE NOTE - NSFALLUNIVINTERV_ED_ALL_ED
Bed/Stretcher in lowest position, wheels locked, appropriate side rails in place/Call bell, personal items and telephone in reach/Instruct patient to call for assistance before getting out of bed/chair/stretcher/Non-slip footwear applied when patient is off stretcher/[place] to call system/Physically safe environment - no spills, clutter or unnecessary equipment/Purposeful proactive rounding/Room/bathroom lighting operational, light cord in reach

## 2023-07-09 NOTE — ED PROVIDER NOTE - OBJECTIVE STATEMENT
[employer] message sent to inform   67-year-old male with history of MI in 2013, cardiac stent x 1,idiopathic cardiomyopathy, ICD placement, HLD, currently on aspirin 81 mg, Lipitor, Entresto, metoprolol, spironolactone, omeprazole, presents to the emergency department states that yesterday evening at 5 PM he took 1 pill of Viagra, this morning patient felt lightheaded and dizzy and fatigued, denies any chest pain.

## 2023-07-09 NOTE — ED PROVIDER NOTE - PATIENT PORTAL LINK FT
You can access the FollowMyHealth Patient Portal offered by [facility] by registering at the following website: [email or web address]. By joining [facility]’s FollowMyHealth portal, you will also be able to view your health information using other applications (apps) compatible with our system.

## 2023-07-09 NOTE — ED ADULT NURSE NOTE - CHIEF COMPLAINT QUOTE
Patient is a 66yo male complaining of lightheadedness today starting this morning Patient states he thinks it due to the Viagra he took last night Patient denies nausea vomiting diarrhea fever. Patient had cardiac stents Patient has taken Viagra about 10 times before with no lightheadedness

## 2023-07-09 NOTE — ED PROVIDER NOTE - PROGRESS NOTE DETAILS
Patient feeling well, no chest pain or shortness of breath, Patient feels comfortable to be discharged home with follow-up with primary care doctor

## 2023-07-09 NOTE — ED PROVIDER NOTE - NSFOLLOWUPINSTRUCTIONS_ED_ALL_ED_FT
Follow-up with your primary care doctor.  Continue taking your medications as prescribed.  Return to ER if having chest pain, difficulty breathing and or worsening of symptoms.
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
## 2023-07-09 NOTE — ED PROVIDER NOTE - CLINICAL SUMMARY MEDICAL DECISION MAKING FREE TEXT BOX
67 male feeling lightheaded and dizzy status post Viagra, history of cardiac risk factors, denies taking any nitroglycerin, follow-up EKG, CBC, CMP, cardiac enzymes, IV fluids, orthostatics and reevaluate.

## 2023-07-10 ENCOUNTER — APPOINTMENT (OUTPATIENT)
Dept: ELECTROPHYSIOLOGY | Facility: CLINIC | Age: 68
End: 2023-07-10
Payer: MEDICARE

## 2023-07-10 ENCOUNTER — NON-APPOINTMENT (OUTPATIENT)
Age: 68
End: 2023-07-10

## 2023-07-10 PROCEDURE — 93296 REM INTERROG EVL PM/IDS: CPT

## 2023-07-10 PROCEDURE — 93295 DEV INTERROG REMOTE 1/2/MLT: CPT

## 2023-08-09 NOTE — ED PROVIDER NOTE - OBJECTIVE STATEMENT
Pt states the [facility] was empty and couldn't get the Percocet. She is wondering if it can be sent to the [facility] on [address] in [facility]. I will sonia up the med and see what you want to do.     64 y male presents with left hand 5th metacarpal closed blood blister, states he noted it last night, does not recall specific injury, denies pain, has from, nvi of left hand, otherwise asymptomatic.  states he had a dermatologic procedure 2 weeks ago by Dermatologist Dr Hdz.  nonsmoker

## 2023-10-09 ENCOUNTER — APPOINTMENT (OUTPATIENT)
Dept: ELECTROPHYSIOLOGY | Facility: CLINIC | Age: 68
End: 2023-10-09
Payer: MEDICARE

## 2023-10-09 VITALS
DIASTOLIC BLOOD PRESSURE: 69 MMHG | BODY MASS INDEX: 26.99 KG/M2 | OXYGEN SATURATION: 98 % | SYSTOLIC BLOOD PRESSURE: 112 MMHG | HEART RATE: 65 BPM | WEIGHT: 162 LBS | HEIGHT: 65 IN

## 2023-10-09 PROCEDURE — ZZZZZ: CPT

## 2023-11-21 NOTE — ED ADULT NURSE NOTE - BREATH SOUNDS RUL
[facility] pharmacy faxed that prenatal vitamin is not covered. New script sent with preferred prenatal vitamin.   
clear

## 2023-12-07 ENCOUNTER — APPOINTMENT (OUTPATIENT)
Dept: CARDIOLOGY | Facility: HOSPITAL | Age: 68
End: 2023-12-07

## 2023-12-07 ENCOUNTER — OUTPATIENT (OUTPATIENT)
Dept: OUTPATIENT SERVICES | Facility: HOSPITAL | Age: 68
LOS: 1 days | End: 2023-12-07
Payer: COMMERCIAL

## 2023-12-07 VITALS
BODY MASS INDEX: 26.99 KG/M2 | OXYGEN SATURATION: 99 % | HEART RATE: 65 BPM | WEIGHT: 162 LBS | HEIGHT: 65 IN | DIASTOLIC BLOOD PRESSURE: 72 MMHG | SYSTOLIC BLOOD PRESSURE: 114 MMHG

## 2023-12-07 DIAGNOSIS — Z98.89 OTHER SPECIFIED POSTPROCEDURAL STATES: Chronic | ICD-10-CM

## 2023-12-07 DIAGNOSIS — Z95.5 PRESENCE OF CORONARY ANGIOPLASTY IMPLANT AND GRAFT: Chronic | ICD-10-CM

## 2023-12-07 DIAGNOSIS — Z95.810 PRESENCE OF AUTOMATIC (IMPLANTABLE) CARDIAC DEFIBRILLATOR: Chronic | ICD-10-CM

## 2023-12-07 DIAGNOSIS — I42.9 CARDIOMYOPATHY, UNSPECIFIED: ICD-10-CM

## 2023-12-07 DIAGNOSIS — I25.10 ATHEROSCLEROTIC HEART DISEASE OF NATIVE CORONARY ARTERY WITHOUT ANGINA PECTORIS: ICD-10-CM

## 2023-12-07 PROCEDURE — 93005 ELECTROCARDIOGRAM TRACING: CPT

## 2023-12-07 PROCEDURE — G0463: CPT

## 2023-12-07 RX ORDER — SPIRONOLACTONE 25 MG/1
25 TABLET ORAL DAILY
Qty: 90 | Refills: 1 | Status: ACTIVE | COMMUNITY
Start: 2023-04-25 | End: 1900-01-01

## 2023-12-07 RX ORDER — METOPROLOL SUCCINATE 50 MG/1
50 TABLET, EXTENDED RELEASE ORAL DAILY
Qty: 135 | Refills: 1 | Status: ACTIVE | COMMUNITY
Start: 2022-09-12 | End: 1900-01-01

## 2023-12-07 RX ORDER — SACUBITRIL AND VALSARTAN 49; 51 MG/1; MG/1
49-51 TABLET, FILM COATED ORAL TWICE DAILY
Qty: 180 | Refills: 1 | Status: ACTIVE | COMMUNITY
Start: 2021-08-11 | End: 1900-01-01

## 2023-12-14 LAB
ALBUMIN SERPL ELPH-MCNC: 4.2 G/DL
ALP BLD-CCNC: 59 U/L
ALT SERPL-CCNC: 21 U/L
ANION GAP SERPL CALC-SCNC: 8 MMOL/L
AST SERPL-CCNC: 20 U/L
BILIRUB SERPL-MCNC: 0.6 MG/DL
BUN SERPL-MCNC: 30 MG/DL
CALCIUM SERPL-MCNC: 9.4 MG/DL
CHLORIDE SERPL-SCNC: 104 MMOL/L
CHOLEST SERPL-MCNC: 117 MG/DL
CO2 SERPL-SCNC: 26 MMOL/L
CREAT SERPL-MCNC: 1.06 MG/DL
EGFR: 76 ML/MIN/1.73M2
ESTIMATED AVERAGE GLUCOSE: 108 MG/DL
GLUCOSE SERPL-MCNC: 80 MG/DL
HBA1C MFR BLD HPLC: 5.4 %
HDLC SERPL-MCNC: 56 MG/DL
LDLC SERPL CALC-MCNC: 46 MG/DL
NONHDLC SERPL-MCNC: 61 MG/DL
POTASSIUM SERPL-SCNC: 4.7 MMOL/L
PROT SERPL-MCNC: 6.6 G/DL
SODIUM SERPL-SCNC: 138 MMOL/L
TRIGL SERPL-MCNC: 74 MG/DL

## 2023-12-27 ENCOUNTER — NON-APPOINTMENT (OUTPATIENT)
Age: 68
End: 2023-12-27

## 2023-12-27 ENCOUNTER — OUTPATIENT (OUTPATIENT)
Dept: OUTPATIENT SERVICES | Facility: HOSPITAL | Age: 68
LOS: 1 days | End: 2023-12-27
Payer: COMMERCIAL

## 2023-12-27 ENCOUNTER — APPOINTMENT (OUTPATIENT)
Dept: INTERNAL MEDICINE | Facility: CLINIC | Age: 68
End: 2023-12-27
Payer: MEDICARE

## 2023-12-27 VITALS
OXYGEN SATURATION: 97 % | WEIGHT: 160 LBS | HEIGHT: 65 IN | HEART RATE: 68 BPM | DIASTOLIC BLOOD PRESSURE: 70 MMHG | BODY MASS INDEX: 26.66 KG/M2 | SYSTOLIC BLOOD PRESSURE: 110 MMHG

## 2023-12-27 DIAGNOSIS — N52.9 MALE ERECTILE DYSFUNCTION, UNSPECIFIED: ICD-10-CM

## 2023-12-27 DIAGNOSIS — Z23 ENCOUNTER FOR IMMUNIZATION: ICD-10-CM

## 2023-12-27 DIAGNOSIS — I10 ESSENTIAL (PRIMARY) HYPERTENSION: ICD-10-CM

## 2023-12-27 DIAGNOSIS — I49.9 CARDIAC ARRHYTHMIA, UNSPECIFIED: ICD-10-CM

## 2023-12-27 DIAGNOSIS — I50.20 UNSPECIFIED SYSTOLIC (CONGESTIVE) HEART FAILURE: ICD-10-CM

## 2023-12-27 DIAGNOSIS — I21.9 ACUTE MYOCARDIAL INFARCTION, UNSPECIFIED: ICD-10-CM

## 2023-12-27 DIAGNOSIS — Z95.5 PRESENCE OF CORONARY ANGIOPLASTY IMPLANT AND GRAFT: Chronic | ICD-10-CM

## 2023-12-27 DIAGNOSIS — Z95.810 PRESENCE OF AUTOMATIC (IMPLANTABLE) CARDIAC DEFIBRILLATOR: Chronic | ICD-10-CM

## 2023-12-27 DIAGNOSIS — Z98.89 OTHER SPECIFIED POSTPROCEDURAL STATES: Chronic | ICD-10-CM

## 2023-12-27 PROCEDURE — G0009: CPT

## 2023-12-27 PROCEDURE — G0463: CPT | Mod: 25

## 2023-12-27 PROCEDURE — 99214 OFFICE O/P EST MOD 30 MIN: CPT | Mod: 25,GC

## 2023-12-27 PROCEDURE — 85027 COMPLETE CBC AUTOMATED: CPT

## 2023-12-27 RX ORDER — SILDENAFIL 100 MG/1
100 TABLET, FILM COATED ORAL
Qty: 10 | Refills: 0 | Status: ACTIVE | COMMUNITY
Start: 2023-12-27

## 2023-12-27 RX ORDER — ALUMINUM HYDROXIDE AND MAGNESIUM CARBONATE 160; 105 MG/1; MG/1
160-105 TABLET, CHEWABLE ORAL
Qty: 90 | Refills: 5 | Status: DISCONTINUED | COMMUNITY
Start: 2022-03-23 | End: 2023-12-27

## 2023-12-27 NOTE — ASSESSMENT
[FreeTextEntry1] : #HCM - Colonoscopy next due 2027 - Tdap and Prevnar 20 today. Deferred Shingles due to patient hesitation re: having multiple vaccines during visit, instructed patient to go to local pharmacy for Shingles shot.  - Repeat CBC as last 2022  RTC in 5 weeks

## 2023-12-27 NOTE — HISTORY OF PRESENT ILLNESS
[FreeTextEntry1] : CPE [de-identified] : Mr. Farr is a 68-yo male w/ PMHx of STEMI 12/2013 s/p BMS to proximal LAD, ICM w/ HFrEF ~20-30% previously s/p ICD placement for primary prevention, most recent 36% in 2022, VT s/p VT ablation 2/2020 w/o further episodes, provoked RUE DVT s/p Eliquis here to establish care. No new complaints or diagnoses since last visit to Cardiologist  #CAD #Prior STEMI - No recent worsening SOB or chest pain. - Taking ASA 81 and Atorvastatin 80 mg, last LDL 46 12/2023  #HFrEF - Taking Metop Succinate 50 mg QD, Entresto 49/51 BID, Spironolactone 25 mg QD - Referred to PCP to arrange Farxiga. - No worsening SOB or new onset orthopnea  #VT #SVT - Hx of ICD placement for primary prevention. Hx of VT s/p ablation in 2020, no further episodes, not on antiarrhythmic medications.  - Prior interrogations w/ SVT episodes, most recent interrogation on 12/2022 w/o SVT episodes.  - Denies palpiations or episodes of passing out recently, or ICD shocks - EP appointment 1/8/2024  #ED - Viagra helping - Reports that when masturbating having no issues with ejaculation however when having intercourse w/ girlfriend, has difficulty ejaculating. Was recommended by cardiologist for cognitive behavioral therapy.   #Hernia - Pt noted to have prior hernia and saw surgery, was planning for open repair vs. laparoscopic due to cardiac comorbidities - Has some hernia-related abdominal pain, feels able to manage it w/o surgery, is a little scared of undergoing surgery due to cardiac comorbidities.   #HCM - Colonoscopy CT done 2022 w/ recommendation for 5 year follow-up due to normal results - Last a1c, CMP, Lipid panel wnl. No CBC done in recent years, last in 2017.  - Got Flu shot this year - COVID shot x4 - Counseled re: shingles shot, had only 1 dose 5 years ago. - Had last tdap 15 years ago, amenable to getting it - Amenable to Prevnar 20

## 2023-12-27 NOTE — PHYSICAL EXAM
[No Acute Distress] : no acute distress [Well Nourished] : well nourished [Well Developed] : well developed [Well-Appearing] : well-appearing [Normal Sclera/Conjunctiva] : normal sclera/conjunctiva [PERRL] : pupils equal round and reactive to light [EOMI] : extraocular movements intact [Normal Outer Ear/Nose] : the outer ears and nose were normal in appearance [Normal Oropharynx] : the oropharynx was normal [No JVD] : no jugular venous distention [No Lymphadenopathy] : no lymphadenopathy [Supple] : supple [Thyroid Normal, No Nodules] : the thyroid was normal and there were no nodules present [No Respiratory Distress] : no respiratory distress  [No Accessory Muscle Use] : no accessory muscle use [Clear to Auscultation] : lungs were clear to auscultation bilaterally [Normal Rate] : normal rate  [Regular Rhythm] : with a regular rhythm [Normal S1, S2] : normal S1 and S2 [No Murmur] : no murmur heard [No Carotid Bruits] : no carotid bruits [No Abdominal Bruit] : a ~M bruit was not heard ~T in the abdomen [No Varicosities] : no varicosities [Pedal Pulses Present] : the pedal pulses are present [No Edema] : there was no peripheral edema [No Palpable Aorta] : no palpable aorta [No Extremity Clubbing/Cyanosis] : no extremity clubbing/cyanosis [Soft] : abdomen soft [Non Tender] : non-tender [Non-distended] : non-distended [No Masses] : no abdominal mass palpated [No HSM] : no HSM [Normal Bowel Sounds] : normal bowel sounds [Normal Posterior Cervical Nodes] : no posterior cervical lymphadenopathy [Normal Anterior Cervical Nodes] : no anterior cervical lymphadenopathy [No CVA Tenderness] : no CVA  tenderness [No Spinal Tenderness] : no spinal tenderness [No Joint Swelling] : no joint swelling [Grossly Normal Strength/Tone] : grossly normal strength/tone [No Rash] : no rash [Coordination Grossly Intact] : coordination grossly intact [No Focal Deficits] : no focal deficits [Normal Gait] : normal gait [Deep Tendon Reflexes (DTR)] : deep tendon reflexes were 2+ and symmetric [Normal Affect] : the affect was normal [Normal Insight/Judgement] : insight and judgment were intact [de-identified] : No outpouching palpated w/ vagal maneuvers, supine, or standing on inguinal hernia exam bilaterally

## 2023-12-27 NOTE — HEALTH RISK ASSESSMENT
[Fair] :  ~his/her~ mood as fair [Alone] : lives alone [Fully functional (bathing, dressing, toileting, transferring, walking, feeding)] : Fully functional (bathing, dressing, toileting, transferring, walking, feeding) [Fully functional (using the telephone, shopping, preparing meals, housekeeping, doing laundry, using] : Fully functional and needs no help or supervision to perform IADLs (using the telephone, shopping, preparing meals, housekeeping, doing laundry, using transportation, managing medications and managing finances) [No] : No [Significant Other] : lives with significant other [Sexually Active] : sexually active [Former] : Former [15-19] : 15-19 [High Risk Behavior] : no high risk behavior [de-identified] : Quit 40 years ago, 15-pk years (1pk-day for 15 years)

## 2023-12-28 LAB
HCT VFR BLD CALC: 47.1 %
HGB BLD-MCNC: 14.8 G/DL
MCHC RBC-ENTMCNC: 30.6 PG
MCHC RBC-ENTMCNC: 31.4 GM/DL
MCV RBC AUTO: 97.3 FL
PLATELET # BLD AUTO: 138 K/UL
RBC # BLD: 4.84 M/UL
RBC # FLD: 14.4 %
WBC # FLD AUTO: 5.19 K/UL

## 2024-01-01 NOTE — ED ADULT NURSE NOTE - CAS DISCH ACCOMP BY
Baby's discharge instructions given to MOM and DAD at bedside with all questions answered and baby discharged home to mother's care   
Infant admitted to  in mother's arms. ID bands verified by 2 RNs. Assessment completed & documented, footprints taken, admission orders reviewed & noted. Infant remains in room with mother.  
Postpartum and  care booklet at bedside.  Encouraged mother to review and ask questions if needed before discharge.  
Self

## 2024-01-04 DIAGNOSIS — Z23 ENCOUNTER FOR IMMUNIZATION: ICD-10-CM

## 2024-01-04 DIAGNOSIS — I21.9 ACUTE MYOCARDIAL INFARCTION, UNSPECIFIED: ICD-10-CM

## 2024-01-04 DIAGNOSIS — I49.9 CARDIAC ARRHYTHMIA, UNSPECIFIED: ICD-10-CM

## 2024-01-04 DIAGNOSIS — K40.90 UNILATERAL INGUINAL HERNIA, WITHOUT OBSTRUCTION OR GANGRENE, NOT SPECIFIED AS RECURRENT: ICD-10-CM

## 2024-01-04 DIAGNOSIS — I50.20 UNSPECIFIED SYSTOLIC (CONGESTIVE) HEART FAILURE: ICD-10-CM

## 2024-01-04 DIAGNOSIS — N52.9 MALE ERECTILE DYSFUNCTION, UNSPECIFIED: ICD-10-CM

## 2024-01-08 ENCOUNTER — NON-APPOINTMENT (OUTPATIENT)
Age: 69
End: 2024-01-08

## 2024-01-08 ENCOUNTER — APPOINTMENT (OUTPATIENT)
Dept: ELECTROPHYSIOLOGY | Facility: CLINIC | Age: 69
End: 2024-01-08
Payer: MEDICARE

## 2024-01-09 PROCEDURE — 93295 DEV INTERROG REMOTE 1/2/MLT: CPT

## 2024-01-09 PROCEDURE — 93296 REM INTERROG EVL PM/IDS: CPT

## 2024-01-23 ENCOUNTER — APPOINTMENT (OUTPATIENT)
Dept: INTERNAL MEDICINE | Facility: CLINIC | Age: 69
End: 2024-01-23
Payer: MEDICARE

## 2024-01-23 PROCEDURE — 90791 PSYCH DIAGNOSTIC EVALUATION: CPT

## 2024-01-23 NOTE — ED PROVIDER NOTE - IV ALTEPLASE EXCL REL HIDDEN
NEPHROLOGY PROGRESS NOTE   Eva Lacey 76 y.o. female MRN: [id number]  Unit/Bed#: [facility] 501-01 Encounter: [id number]  Reason for Consult: hypokalemia    ASSESSMENT AND PLAN:  Patient is 76-year-old female with significant medical issues of essential tremors, COPD, history of CVA, hyperlipidemia, GERD, presented with ambulatory dysfunction, lower extremity weakness.  We are consulted for hypokalemia management.     Hypokalemia  -Potassium 2.4 this admission now improving with aggressive replacement.  -Potassium level is improved 3.6 today.  -Will give potassium chloride 40 meq once today.  -Noted urine potassium 30, urine potassium/creatinine ratio 43.5 mEq/g suggestive of renal loss of potassium (evaluated during hypokalemic state)  -Also noted mild hypomagnesemia upon admission which can cause renal loss of potassium  -Noted patient has intermittent/occasional hypokalemia issues in the past although not on any regular potassium supplements at home.  -She denies any obvious history of hypertension although noted blood pressure slightly above goal this admission  -No obvious alkalosis, she denies taking any diuretics at home.  No obvious GI loss based on clinical history.  -Given slightly elevated BP along with intermittent hypokalemia in the past, follow results for serum aldosterone, PRA  -CT scan with contrast shows unremarkable adrenal glands.  -May eventually consider potassium sparing diuretics depending on further workup.     Hypomagnesemia, resolved with replacement.  Most recent serum magnesium 2.0.     Renal function overall stable with serum creatinine 0.6  Ambulatory dysfunction, further management as per primary team.    Discussed above plan in detail with primary team regarding replacing potassium supplement today, monitoring levels and they agree with above recommendations.      SUBJECTIVE:  Patient seen and examined at bedside.  Denies any nausea or vomiting    OBJECTIVE:  Current Weight: Weight -  Scale: 64 kg (141 lb)  Vitals:    01/23/24 0835   BP: 130/69   Pulse: 91   Resp:    Temp: (!) 97.4 °F (36.3 °C)   SpO2: 94%       Intake/Output Summary (Last 24 hours) at 1/23/2024 1011  Last data filed at 1/23/2024 0835  Gross per 24 hour   Intake 790 ml   Output 1050 ml   Net -260 ml     Wt Readings from Last 3 Encounters:   01/21/24 64 kg (141 lb)   09/27/23 64.4 kg (141 lb 15.6 oz)   09/12/23 64.4 kg (142 lb)     Temp Readings from Last 3 Encounters:   01/23/24 (!) 97.4 °F (36.3 °C) (Oral)   10/02/23 (!) 97.4 °F (36.3 °C) (Oral)   09/12/23 97.9 °F (36.6 °C) (Temporal)     BP Readings from Last 3 Encounters:   01/23/24 130/69   10/02/23 133/68   09/12/23 122/84     Pulse Readings from Last 3 Encounters:   01/23/24 91   10/02/23 77   09/12/23 85        Physical Examination:  Eyes: No conjunctival pallor present  Neck: No obvious lymphadenopathy appreciated  Respiratory: Bilateral air entry present  CVS: No significant edema in legs  GI: Soft, nondistended  CNS: Active, alert, oriented  Skin: No new rash  Musculoskeletal: No obvious new gross deformity noted    Medications:    Current Facility-Administered Medications:     acetaminophen (TYLENOL) tablet 650 mg, 650 mg, Oral, Q6H PRN, Huong Alcala MD, 650 mg at 01/21/24 1510    atorvastatin (LIPITOR) tablet 10 mg, 10 mg, Oral, Daily With Dinner, Huong Alcala MD, 10 mg at 01/22/24 1635    enoxaparin (LOVENOX) subcutaneous injection 40 mg, 40 mg, Subcutaneous, Daily, Huong Alcala MD, 40 mg at 01/23/24 0828    famotidine (PEPCID) tablet 40 mg, 40 mg, Oral, Daily, Huong Alcala MD, 40 mg at 01/23/24 0827    multivitamin-minerals (CENTRUM) tablet 1 tablet, 1 tablet, Oral, Daily, Huong Alcala MD, 1 tablet at 01/23/24 0827    primidone (MYSOLINE) tablet 50 mg, 50 mg, Oral, HS, Huong Alcala MD, 50 mg at 01/22/24 2100    sodium chloride (OCEAN) 0.65 % nasal spray 1 spray, 1 spray, Each Nare, Q1H PRN, Spencer Marroquin DO, 1 spray at 01/21/24 0523    Laboratory  "Results:  Results from last 7 days   Lab Units 01/23/24  0821 01/22/24  0602 01/22/24  0452 01/21/24  0518 01/20/24  2228 01/20/24  1343 01/20/24  0433 01/19/24  1125   WBC Thousand/uL 8.25 7.43  --   --   --   --  14.37* 16.10*   HEMOGLOBIN g/dL 11.9 9.9*  --   --   --   --  11.4* 13.6   HEMATOCRIT % 35.4 30.6*  --   --   --   --  33.9* 39.6   PLATELETS Thousands/uL 260 208  --   --   --   --  240 308   SODIUM mmol/L 140  --  145 146 142  --  145 144   POTASSIUM mmol/L 3.6  --  3.2* 4.1 3.8 2.9* 2.4* 3.1*   CHLORIDE mmol/L 106  --  109* 111* 109*  --  103 100   CO2 mmol/L 22  --  27 26 28  --  29 30   BUN mg/dL 11  --  12 13 13  --  13 15   CREATININE mg/dL 0.60  --  0.62 0.69 0.67  --  0.68 0.83   CALCIUM mg/dL 8.6  --  8.3* 8.4 8.0*  --  8.5 10.5*   MAGNESIUM mg/dL  --   --  2.0  --  2.2  --  1.6*  --    PHOSPHORUS mg/dL  --   --   --   --   --   --  2.3  --        TRAUMA - CT head wo contrast   Final Result by Kofi Granger MD (01/19 9793)      No acute intracranial abnormality.                  Workstation performed: [id number]         TRAUMA - CT spine cervical wo contrast   Final Result by Kofi Granger MD (01/19 0682)      No cervical spine fracture or traumatic malalignment.                  Workstation performed: [id number]         TRAUMA - CT chest abdomen pelvis w contrast   Final Result by Kofi Granger MD (01/19 0690)      No acute traumatic parenchymal injury in chest, abdomen or pelvis. No acute fracture.      Reidentified pulmonary emphysematous changes. No new or enlarging solid pulmonary nodule.      Small sliding-type hiatal hernia. Thickening of the wall of the gastric fundus and body suggestive of gastritis but similar when compared to previous examinations.      IV contrast extravasation into the upper extremity as described above.      This examination was marked \"immediate notification\" in Epic in order to begin the standard process by which the radiology reading room liaison " "alerts the referring practitioner.         Workstation performed: [id number]             Portions of the record may have been created with voice recognition software. Occasional wrong word or \"sound a like\" substitutions may have occurred due to the inherent limitations of voice recognition software. Read the chart carefully and recognize, using context, where substitutions have occurred.    " show

## 2024-01-26 NOTE — ED ADULT NURSE NOTE - NS PRO AD PATIENT TYPE
A My-Chart message has been sent to the patient for PATIENT ROUNDING with [facility].   Health Care Proxy (HCP)

## 2024-01-30 ENCOUNTER — APPOINTMENT (OUTPATIENT)
Dept: INTERNAL MEDICINE | Facility: CLINIC | Age: 69
End: 2024-01-30
Payer: MEDICARE

## 2024-01-30 PROCEDURE — 90834 PSYTX W PT 45 MINUTES: CPT

## 2024-02-19 NOTE — ED ADULT NURSE NOTE - EXTENSIONS OF SELF_ADULT
Please enter lab orders for the patient's upcoming physical appointment.     Physical scheduled:   Your appointments       Date & Time Appointment Department ([place])    Apr 20, 2024 10:30 AM CDT Physical - Established with Marycarmen Watt MD [facility] ([facility])              [facility]  [address]  [facility] [address]  [phone]           Preferred lab: [facility]     The patient has been notified to complete fasting labs prior to their physical appointment.     
1 watch/Eyeglasses

## 2024-03-05 ENCOUNTER — NON-APPOINTMENT (OUTPATIENT)
Age: 69
End: 2024-03-05

## 2024-03-05 ENCOUNTER — RESULT REVIEW (OUTPATIENT)
Age: 69
End: 2024-03-05

## 2024-03-05 ENCOUNTER — APPOINTMENT (OUTPATIENT)
Dept: SURGERY | Facility: CLINIC | Age: 69
End: 2024-03-05
Payer: MEDICARE

## 2024-03-05 VITALS
TEMPERATURE: 97.1 F | SYSTOLIC BLOOD PRESSURE: 92 MMHG | HEIGHT: 65 IN | WEIGHT: 169 LBS | BODY MASS INDEX: 28.16 KG/M2 | DIASTOLIC BLOOD PRESSURE: 60 MMHG | HEART RATE: 92 BPM | OXYGEN SATURATION: 60 %

## 2024-03-05 DIAGNOSIS — R10.31 RIGHT LOWER QUADRANT PAIN: ICD-10-CM

## 2024-03-05 DIAGNOSIS — N50.3 CYST OF EPIDIDYMIS: ICD-10-CM

## 2024-03-05 DIAGNOSIS — N50.89 OTHER SPECIFIED DISORDERS OF THE MALE GENITAL ORGANS: ICD-10-CM

## 2024-03-05 DIAGNOSIS — K40.90 UNILATERAL INGUINAL HERNIA, W/OUT OBSTRUCTION OR GANGRENE, NOT SPECIFIED AS RECURRENT: ICD-10-CM

## 2024-03-05 PROCEDURE — 99215 OFFICE O/P EST HI 40 MIN: CPT

## 2024-03-05 NOTE — PLAN
[FreeTextEntry1] : Intermittent but persistent right groin discomfort. Examination today unrevealing on the right with seemingly stable +/- incidental left inguinal hernia. Prior CT negative on right, though that study is now distant. Review of records with diagnosed epididymal cyst, though patient surprised by my notice and discussion today. As for right groin pain, history somewhat atypical for hernia and exam repeatedly negative. Concerns: very small hernia?  chronic musculoskeletal process? referred pain from large epididymal cyst As such, will obtain up to date CT scan of abdomen and pelvis as well as scrotal ultrasound. Further General Surgery recommendations to follow the imaging as above. Mr. Farr is pleased and agrees, though verbalizes a concern for any surgery at this time. If CT negative and Urologic process stable, can consider Sports Medicine evaluation versus diagnostic laparoscopy +/- robotic inspection in anticipation of left inguinal hernia repair and inspection of right groin. I remain available. Please note that more than 50% of face to face time was spent in counseling and coordination of care.

## 2024-03-05 NOTE — REVIEW OF SYSTEMS
[As Noted in HPI] : as noted in HPI [Negative] : Heme/Lymph [FreeTextEntry8] : known epididymal cyst by review of records, though patient surprised by my discussion today...

## 2024-03-05 NOTE — DATA REVIEWED
[FreeTextEntry1] : ACC: [id number] EXAM: CT ABDOMEN AND PELVIS PROCEDURE DATE: 05/16/2022 INTERPRETATION: CLINICAL INFORMATION: Intermittent right lower quadrant pain COMPARISON: 4/25/2022 CONTRAST/COMPLICATIONS: IV Contrast: NONE Oral Contrast: NONE Complications: None reported at time of study completion PROCEDURE: CT of the Abdomen and Pelvis was performed. Sagittal and coronal reformats were performed. FINDINGS: LOWER CHEST: Within normal limits. LIVER: Stable hypoattenuating hepatic lesions likely hemangiomas BILE DUCTS: Normal caliber. GALLBLADDER: Within normal limits. SPLEEN: Within normal limits. PANCREAS: Within normal limits. ADRENALS: Within normal limits. KIDNEYS/URETERS: Right renal cyst similar to prior. BLADDER: Within normal limits. REPRODUCTIVE ORGANS: Enlarged prostate BOWEL: No bowel obstruction. Colonic diverticula without acute diverticulitis.. Appendix calcified appendicolith. No evidence of appendicitis PERITONEUM: No ascites. VESSELS: Atherosclerotic, nonaneurysmal RETROPERITONEUM/LYMPH NODES: Scattered nonspecific subcentimeter retroperitoneal mesenteric lymph nodes similar to prior. ABDOMINAL WALL: Mild widening left inguinal ring with fat BONES: Degenerative changes. IMPRESSION: No evidence of appendicitis or other acute finding to account for the symptomatology. Nonacute findings as discussed   ACC: [id number] EXAM: US DPLX SCROTUM ACC: [id number] EXAM: US SCROTUM AND CONTENTS PROCEDURE DATE: 05/16/2022 INTERPRETATION: CLINICAL INFORMATION: Right scrotal pain COMPARISON: Scrotal ultrasound 12/3/2017 TECHNIQUE: Testicular ultrasound utilizing color and spectral Doppler. FINDINGS: RIGHT: Right testis: 5.6 x 2.2 x 3.7 cm. Cystic ectasia of rete testis. Normal arterial and venous blood flow pattern. Right epididymis: Within normal limits. Epididymal head cysts measuring up to 5.5 cm Right hydrocele: Small volume Right varicocele: None.  LEFT: Left testis: 5.1 x 2.4 x 2.9 cm. Cystic ectasia of rete testis. Normal arterial and venous blood flow pattern. Left epididymis: Within normal limits. Epididymal head cysts measuring up to 1.6 cm Left hydrocele: Small volume Left varicocele: Partial visualized  IMPRESSION:  Cystic ectasia of rete testis bilaterally. No sonographic evidence to suggest testicular torsion at this time. Large bilateral epididymal head cysts measuring up to 5.5 cm on the right. Small bilateral hydroceles. Partial visualized left varicocele  --- End of Report ---

## 2024-03-05 NOTE — HISTORY OF PRESENT ILLNESS
[de-identified] : Felix Farr is a 66-year-old male with a history of hypertension, NSTEMI, status post AICD, here for follow-up of abdominal pain after recent ED visit at [place] on 5/16/2022.  Surgery was not consulted in the ED at that time, but per ED notes abdominal pain was thought to be related to left inguinal hernia so he was referred to me for follow-up. Patient reports groin pain intermittently more on right than left for the past year. Pain is brought on by sleeping on the right side or heavy lifting.  Alleviated by rest or lying supine.  No issues with urination. No fevers/chills.  Patient states he sometimes feels a lump on the right side when he is having the pain.  CT abdomen from April 25, 2022 shows ABDOMINAL WALL: Small fat-containing left inguinal hernia.  Repeat CT in the ED on 5/16/2022 similarly demonstrated a fat-containing left inguinal hernia with no other acute intra-abdominal Pathology.  No obvious right-sided hernia on imaging.  History of UE DVT s/p Eliquis no longer taking.  Is taking daily aspirin 81 mg, but no other antiplatelet or anticoagulant. [de-identified] : Felix Farr is a 66-year-old male with a history of hypertension, NSTEMI, status post AICD, here for follow-up of right groin discomfort. Originally seen by another provider within practice following ER evaluation in 2022. Mr. Farr denies any left sided discomfort, but reports ongoing intermittent right groin discomfort, but without visible/ palpable changes. He denies any associated or ongoing GI or  complaints and is free of acute pain today...

## 2024-03-07 NOTE — ED ADULT NURSE NOTE - CAS DISCH CONDITION
Virtual Regular Visit    Verification of patient location:    Patient is located at Home in the following state in which I hold an active license [place]      Assessment/Plan:  1. Hx of gastric bypass  2. Postsurgical malabsorption  3. Low ferritin  4. Iron deficiency anemia following bariatric surgery  Ms. Hand is a 47-year-old female seen in follow-up for iron deficiency anemia.  She has iron deficiency secondary to postsurgical malabsorption after Nicol-en-Y bariatric surgery in 2005.  She has been treated with IV iron previously with her last dose being in 2022.  Since then she has been able to maintain her iron levels adequately with no oral supplementation.  She never had any symptoms of iron deficiency and never had any anemia associated with this.  She continues to take a bariatric multivitamin and sublingual B12 supplement 5000 mcg daily.  We discussed that her B12 level is high greater than 3000 and I suggest decreasing her supplement to either 1000 mcg weekly or 500 mcg daily.  I stressed the importance of her continued follow-up with the other bariatric medicine or PCP for annual labs.  Otherwise there is no ongoing need for hematology follow-up at this time.        Reason for visit is   Chief Complaint   Patient presents with    Virtual Regular Visit          Encounter provider MORENO Treadwell    Provider located at [address] [place] [address] [facility] [address] [id number]      Recent Visits  No visits were found meeting these conditions.  Showing recent visits within past 7 days and meeting all other requirements  Today's Visits  Date Type Provider Dept   03/07/24 Telemedicine MORENO Treadwell [facility] [place]   Showing today's visits and meeting all other requirements  Future Appointments  No visits were found meeting these conditions.  Showing future appointments within next 150 days and meeting all other requirements       The  patient was identified by name and date of birth. Keysha Hand was informed that this is a telemedicine visit and that the visit is being conducted through the Epic Embedded platform. She agrees to proceed..  My office door was closed. No one else was in the room.  She acknowledged consent and understanding of privacy and security of the video platform. The patient has agreed to participate and understands they can discontinue the visit at any time.    Patient is aware this is a billable service.     Subjective  Keysha Hand is a 47 y.o. female with history of psoriatic arthritis, Nicol-en-Y bariatric surgery in 2005.  She is seen in follow-up/transfer of care from [facility] for iron deficiency anemia.  She was found to be iron deficient with a ferritin of 7 with no evidence of anemia in 2022.  She received 6 doses of IV Venofer 200 mg and tolerated this well without significant side effects.  Since then she has been able to maintain her iron adequately without needing subsequent doses of IV iron.     8/16/2023: Hemoglobin 14.4, MCV 96, platelets 235, WBC 6.4              Ferritin 88, iron saturation 32%, TIBC 315, serum iron 102  2/21/2024: Hemoglobin 13.5, MCV 96, platelets 236, WBC 5.81    Overall she is feeling well with no complaints or concerns today.  She is still taking bariatric multivitamin and 5000 mcg of B12 daily.        Past Medical History:   Diagnosis Date    Arthritis     Asthma     GERD (gastroesophageal reflux disease)     Obesity        Past Surgical History:   Procedure Laterality Date    CATARACT EXTRACTION Right 02/2023    GASTRIC BYPASS      PARS PLANA VITRECTOMY W/ REPAIR OF MACULAR HOLE Right 09/2022       Current Outpatient Medications   Medication Sig Dispense Refill    albuterol (PROVENTIL HFA,VENTOLIN HFA) 90 mcg/act inhaler Inhale 2 puffs every 6 (six) hours as needed      calcium citrate (CALCITRATE) 950 (200 Ca) MG tablet Take 1 tablet by mouth daily      cyanocobalamin  (VITAMIN B-12) 100 mcg tablet       Docusate Calcium (STOOL SOFTENER PO) Take by mouth      ENBREL SURECLICK 50 MG/ML injection       Multiple Vitamin (MULTI-VITAMIN DAILY) TABS Take by mouth      Semaglutide-Weight Management (WEGOVY) 2.4 MG/0.75ML Inject 0.75 mL (2.4 mg total) under the skin once a week 9 mL 1    VITAMIN D PO Take by mouth       No current facility-administered medications for this visit.        No Known Allergies    Review of Systems   Constitutional:  Negative for activity change, appetite change, fatigue, fever and unexpected weight change.   HENT: Negative.     Eyes:  Negative for photophobia and visual disturbance.   Respiratory: Negative.     Cardiovascular: Negative.    Gastrointestinal: Negative.    Endocrine: Negative for cold intolerance and heat intolerance.   Genitourinary:  Negative for dysuria, hematuria and urgency.   Musculoskeletal:  Negative for arthralgias, back pain, gait problem, joint swelling and myalgias.   Skin:  Negative for pallor and rash.   Neurological:  Negative for dizziness, light-headedness and headaches.   Hematological:  Negative for adenopathy. Does not bruise/bleed easily.   Psychiatric/Behavioral:  Negative for confusion and sleep disturbance.        Video Exam    There were no vitals filed for this visit.    Physical Exam  Constitutional:       General: She is not in acute distress.     Appearance: Normal appearance. She is not ill-appearing.   HENT:      Head: Normocephalic and atraumatic.   Eyes:      Extraocular Movements: Extraocular movements intact.      Conjunctiva/sclera: Conjunctivae normal.      Pupils: Pupils are equal, round, and reactive to light.   Pulmonary:      Effort: Pulmonary effort is normal. No respiratory distress.   Musculoskeletal:      Cervical back: Normal range of motion.   Skin:     Coloration: Skin is not jaundiced or pale.   Neurological:      General: No focal deficit present.      Mental Status: She is alert and oriented to  person, place, and time. Mental status is at baseline.   Psychiatric:         Mood and Affect: Mood normal.         Behavior: Behavior normal.         Thought Content: Thought content normal.         Judgment: Judgment normal.          Visit Time  Total Visit Duration: 22 minutes          Stable

## 2024-03-12 ENCOUNTER — OUTPATIENT (OUTPATIENT)
Dept: OUTPATIENT SERVICES | Facility: HOSPITAL | Age: 69
LOS: 1 days | End: 2024-03-12
Payer: MEDICARE

## 2024-03-12 ENCOUNTER — RESULT REVIEW (OUTPATIENT)
Age: 69
End: 2024-03-12

## 2024-03-12 ENCOUNTER — APPOINTMENT (OUTPATIENT)
Dept: ULTRASOUND IMAGING | Facility: CLINIC | Age: 69
End: 2024-03-12
Payer: MEDICARE

## 2024-03-12 ENCOUNTER — APPOINTMENT (OUTPATIENT)
Dept: CT IMAGING | Facility: CLINIC | Age: 69
End: 2024-03-12
Payer: MEDICARE

## 2024-03-12 DIAGNOSIS — Z98.89 OTHER SPECIFIED POSTPROCEDURAL STATES: Chronic | ICD-10-CM

## 2024-03-12 DIAGNOSIS — Z95.5 PRESENCE OF CORONARY ANGIOPLASTY IMPLANT AND GRAFT: Chronic | ICD-10-CM

## 2024-03-12 DIAGNOSIS — K40.90 UNILATERAL INGUINAL HERNIA, WITHOUT OBSTRUCTION OR GANGRENE, NOT SPECIFIED AS RECURRENT: ICD-10-CM

## 2024-03-12 DIAGNOSIS — N50.89 OTHER SPECIFIED DISORDERS OF THE MALE GENITAL ORGANS: ICD-10-CM

## 2024-03-12 DIAGNOSIS — Z95.810 PRESENCE OF AUTOMATIC (IMPLANTABLE) CARDIAC DEFIBRILLATOR: Chronic | ICD-10-CM

## 2024-03-12 PROCEDURE — 74176 CT ABD & PELVIS W/O CONTRAST: CPT | Mod: 26

## 2024-03-12 PROCEDURE — 76870 US EXAM SCROTUM: CPT | Mod: 26

## 2024-03-12 PROCEDURE — 93975 VASCULAR STUDY: CPT

## 2024-03-12 PROCEDURE — 74176 CT ABD & PELVIS W/O CONTRAST: CPT

## 2024-03-12 PROCEDURE — 76870 US EXAM SCROTUM: CPT

## 2024-03-12 PROCEDURE — 93975 VASCULAR STUDY: CPT | Mod: 26

## 2024-03-18 ENCOUNTER — TRANSCRIPTION ENCOUNTER (OUTPATIENT)
Age: 69
End: 2024-03-18

## 2024-03-19 ENCOUNTER — NON-APPOINTMENT (OUTPATIENT)
Age: 69
End: 2024-03-19

## 2024-03-28 ENCOUNTER — OUTPATIENT (OUTPATIENT)
Dept: OUTPATIENT SERVICES | Facility: HOSPITAL | Age: 69
LOS: 1 days | End: 2024-03-28
Payer: MEDICARE

## 2024-03-28 ENCOUNTER — APPOINTMENT (OUTPATIENT)
Dept: CARDIOLOGY | Facility: HOSPITAL | Age: 69
End: 2024-03-28

## 2024-03-28 VITALS
DIASTOLIC BLOOD PRESSURE: 75 MMHG | WEIGHT: 160 LBS | OXYGEN SATURATION: 96 % | HEIGHT: 65 IN | HEART RATE: 67 BPM | SYSTOLIC BLOOD PRESSURE: 117 MMHG | BODY MASS INDEX: 26.66 KG/M2

## 2024-03-28 DIAGNOSIS — I42.9 CARDIOMYOPATHY, UNSPECIFIED: ICD-10-CM

## 2024-03-28 DIAGNOSIS — Z95.810 PRESENCE OF AUTOMATIC (IMPLANTABLE) CARDIAC DEFIBRILLATOR: Chronic | ICD-10-CM

## 2024-03-28 DIAGNOSIS — I25.10 ATHEROSCLEROTIC HEART DISEASE OF NATIVE CORONARY ARTERY WITHOUT ANGINA PECTORIS: ICD-10-CM

## 2024-03-28 DIAGNOSIS — Z95.5 PRESENCE OF CORONARY ANGIOPLASTY IMPLANT AND GRAFT: Chronic | ICD-10-CM

## 2024-03-28 DIAGNOSIS — Z98.89 OTHER SPECIFIED POSTPROCEDURAL STATES: Chronic | ICD-10-CM

## 2024-03-28 PROCEDURE — G0463: CPT

## 2024-03-28 PROCEDURE — 93005 ELECTROCARDIOGRAM TRACING: CPT

## 2024-03-28 NOTE — PHYSICAL EXAM
[Well Developed] : well developed [Well Nourished] : well nourished [No Acute Distress] : no acute distress [Normal Venous Pressure] : normal venous pressure [Normal Conjunctiva] : normal conjunctiva [No Carotid Bruit] : no carotid bruit [Normal S1, S2] : normal S1, S2 [Murmur] : murmur [Clear Lung Fields] : clear lung fields [Good Air Entry] : good air entry [No Respiratory Distress] : no respiratory distress  [Soft] : abdomen soft [Non Tender] : non-tender [No Masses/organomegaly] : no masses/organomegaly [Normal Bowel Sounds] : normal bowel sounds [Normal Gait] : normal gait [No Edema] : no edema [No Cyanosis] : no cyanosis [No Clubbing] : no clubbing [No Varicosities] : no varicosities [No Rash] : no rash [No Skin Lesions] : no skin lesions [Moves all extremities] : moves all extremities [No Focal Deficits] : no focal deficits [Normal Speech] : normal speech [Alert and Oriented] : alert and oriented [Normal memory] : normal memory

## 2024-03-29 DIAGNOSIS — I42.9 CARDIOMYOPATHY, UNSPECIFIED: ICD-10-CM

## 2024-03-29 NOTE — HISTORY OF PRESENT ILLNESS
[FreeTextEntry1] : 68M of hx of STEMI 12/2013 s/p BMS to proximal LAD, and ICM HFrEF ~20% s/p ICD repeat 36%, gen change 2/2020, VT s/p VT ablation 2/2020, provoked RUE DVT on Eliquis here for f/u. 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 He works as a  services for [employer]. Has no issues going up and down stairs. . Otherwise no LE edema, no palpitations, no dizziness, no chest pain, no PND. No issues with his current medications. Had recent ECHO done (documented below)    TTE 6/3/20: EF 20%, MAC, Moderate MR, Moderately dilated LA 47, Severe segmental LV systolic function, moderate diastolic dysfunction  TTE 12/9/22: LVEF 36%, LVIDd 5.7, Mod-Severe MR, Mild-Mod AR, Mod dilated LA volume index 42 cc/m2 Severe segmental LV systolic function with akinesis of mid and apical septum, anteroseptum, anterior wall, apex, distal inferolateral wall, inferoapical dyskinesis, RVSP 31, small pericardial effusion   3/28/24: since our last visit [facility] and followed up with general surgery to discuss surgical options. Sent to PCP for [facility] to help with SGLT-2 Inhibitor. Today reports no issues, able to climb two flights of stairs without issues. No palpitations, chest pain, dyspnea, LE edema, or orthopnea. May potentially get inguinal hernia repair.    Current Meds:  ASA/Atorva, Farxiga, 10, Entresto 49/51 BID, Spironolactone 25 mg Metoprolol 50 mg QD (has had issues with up-titration in the past)   Lipid Panel 12/14/2023: Tg 74, Cholesterol 117, HDL 56, LDL 46,

## 2024-04-08 ENCOUNTER — APPOINTMENT (OUTPATIENT)
Dept: ELECTROPHYSIOLOGY | Facility: CLINIC | Age: 69
End: 2024-04-08
Payer: MEDICARE

## 2024-04-08 ENCOUNTER — NON-APPOINTMENT (OUTPATIENT)
Age: 69
End: 2024-04-08

## 2024-04-08 PROCEDURE — 93295 DEV INTERROG REMOTE 1/2/MLT: CPT

## 2024-04-08 PROCEDURE — 93296 REM INTERROG EVL PM/IDS: CPT

## 2024-05-24 ENCOUNTER — RX RENEWAL (OUTPATIENT)
Age: 69
End: 2024-05-24

## 2024-05-24 RX ORDER — DAPAGLIFLOZIN 10 MG/1
10 TABLET, FILM COATED ORAL DAILY
Qty: 30 | Refills: 2 | Status: ACTIVE | COMMUNITY
Start: 2023-12-27 | End: 1900-01-01

## 2024-06-27 ENCOUNTER — EMERGENCY (EMERGENCY)
Facility: HOSPITAL | Age: 69
LOS: 1 days | Discharge: HOME | End: 2024-06-27
Attending: EMERGENCY MEDICINE | Admitting: EMERGENCY MEDICINE
Payer: MEDICARE

## 2024-06-27 VITALS
HEIGHT: 65 IN | RESPIRATION RATE: 18 BRPM | OXYGEN SATURATION: 97 % | DIASTOLIC BLOOD PRESSURE: 77 MMHG | SYSTOLIC BLOOD PRESSURE: 124 MMHG | TEMPERATURE: 98 F | WEIGHT: 160.06 LBS | HEART RATE: 78 BPM

## 2024-06-27 VITALS
DIASTOLIC BLOOD PRESSURE: 70 MMHG | HEART RATE: 70 BPM | SYSTOLIC BLOOD PRESSURE: 110 MMHG | RESPIRATION RATE: 16 BRPM | OXYGEN SATURATION: 98 % | TEMPERATURE: 98 F

## 2024-06-27 DIAGNOSIS — Z95.810 PRESENCE OF AUTOMATIC (IMPLANTABLE) CARDIAC DEFIBRILLATOR: Chronic | ICD-10-CM

## 2024-06-27 DIAGNOSIS — Z98.89 OTHER SPECIFIED POSTPROCEDURAL STATES: Chronic | ICD-10-CM

## 2024-06-27 DIAGNOSIS — Z95.5 PRESENCE OF CORONARY ANGIOPLASTY IMPLANT AND GRAFT: Chronic | ICD-10-CM

## 2024-06-27 PROCEDURE — 99284 EMERGENCY DEPT VISIT MOD MDM: CPT

## 2024-06-27 PROCEDURE — 76376 3D RENDER W/INTRP POSTPROCES: CPT | Mod: 26

## 2024-06-27 PROCEDURE — 73700 CT LOWER EXTREMITY W/O DYE: CPT | Mod: MC

## 2024-06-27 PROCEDURE — 96375 TX/PRO/DX INJ NEW DRUG ADDON: CPT

## 2024-06-27 PROCEDURE — 76376 3D RENDER W/INTRP POSTPROCES: CPT

## 2024-06-27 PROCEDURE — 73700 CT LOWER EXTREMITY W/O DYE: CPT | Mod: 26,LT,MC

## 2024-06-27 PROCEDURE — 72131 CT LUMBAR SPINE W/O DYE: CPT | Mod: 26,MC

## 2024-06-27 PROCEDURE — 99284 EMERGENCY DEPT VISIT MOD MDM: CPT | Mod: 25

## 2024-06-27 PROCEDURE — 72131 CT LUMBAR SPINE W/O DYE: CPT | Mod: MC

## 2024-06-27 PROCEDURE — 96374 THER/PROPH/DIAG INJ IV PUSH: CPT

## 2024-06-27 RX ORDER — KETOROLAC TROMETHAMINE 30 MG/ML
15 INJECTION, SOLUTION INTRAMUSCULAR ONCE
Refills: 0 | Status: DISCONTINUED | OUTPATIENT
Start: 2024-06-27 | End: 2024-06-27

## 2024-06-27 RX ORDER — LIDOCAINE HCL 28 MG/G
1 GEL TOPICAL ONCE
Refills: 0 | Status: COMPLETED | OUTPATIENT
Start: 2024-06-27 | End: 2024-06-27

## 2024-06-27 RX ORDER — LIDOCAINE HCL 28 MG/G
1 GEL TOPICAL
Qty: 10 | Refills: 0
Start: 2024-06-27 | End: 2024-07-06

## 2024-06-27 RX ORDER — DIAZEPAM 10 MG/1
5 TABLET ORAL ONCE
Refills: 0 | Status: DISCONTINUED | OUTPATIENT
Start: 2024-06-27 | End: 2024-06-27

## 2024-06-27 RX ORDER — DEXAMETHASONE 1 MG/1
10 TABLET ORAL ONCE
Refills: 0 | Status: COMPLETED | OUTPATIENT
Start: 2024-06-27 | End: 2024-06-27

## 2024-06-27 RX ADMIN — KETOROLAC TROMETHAMINE 15 MILLIGRAM(S): 30 INJECTION, SOLUTION INTRAMUSCULAR at 13:31

## 2024-06-27 RX ADMIN — KETOROLAC TROMETHAMINE 15 MILLIGRAM(S): 30 INJECTION, SOLUTION INTRAMUSCULAR at 13:03

## 2024-06-27 RX ADMIN — DEXAMETHASONE 102 MILLIGRAM(S): 1 TABLET ORAL at 13:36

## 2024-06-27 RX ADMIN — DEXAMETHASONE 10 MILLIGRAM(S): 1 TABLET ORAL at 13:31

## 2024-06-27 RX ADMIN — LIDOCAINE HCL 1 PATCH: 28 GEL TOPICAL at 13:04

## 2024-06-27 RX ADMIN — DIAZEPAM 5 MILLIGRAM(S): 10 TABLET ORAL at 13:03

## 2024-07-08 ENCOUNTER — APPOINTMENT (OUTPATIENT)
Dept: ELECTROPHYSIOLOGY | Facility: CLINIC | Age: 69
End: 2024-07-08

## 2024-07-15 ENCOUNTER — APPOINTMENT (OUTPATIENT)
Dept: ORTHOPEDIC SURGERY | Facility: CLINIC | Age: 69
End: 2024-07-15
Payer: MEDICARE

## 2024-07-15 VITALS — BODY MASS INDEX: 27.99 KG/M2 | HEIGHT: 65 IN | WEIGHT: 168 LBS

## 2024-07-15 DIAGNOSIS — M54.16 RADICULOPATHY, LUMBAR REGION: ICD-10-CM

## 2024-07-15 DIAGNOSIS — I10 ESSENTIAL (PRIMARY) HYPERTENSION: ICD-10-CM

## 2024-07-15 DIAGNOSIS — I25.2 OLD MYOCARDIAL INFARCTION: ICD-10-CM

## 2024-07-15 DIAGNOSIS — E78.00 PURE HYPERCHOLESTEROLEMIA, UNSPECIFIED: ICD-10-CM

## 2024-07-15 PROCEDURE — 99204 OFFICE O/P NEW MOD 45 MIN: CPT

## 2024-08-12 ENCOUNTER — APPOINTMENT (OUTPATIENT)
Dept: ELECTROPHYSIOLOGY | Facility: CLINIC | Age: 69
End: 2024-08-12

## 2024-08-19 NOTE — ED ADULT NURSE NOTE - MUSCULOSKELETAL ASSESSMENT
Health Maintenance       COVID-19 Vaccine (4 - 2023-24 season)  Overdue since 9/1/2023    Chlamydia and Gonorrhea Screening (if sexually active) (Yearly)  Due soon on 10/10/2024           Following review of the above:  Patient is not proceeding with: Chlamydia and Gonorrhea Screening and COVID-19    Note: Refer to final orders and clinician documentation.       - - -

## 2024-09-03 ENCOUNTER — APPOINTMENT (OUTPATIENT)
Dept: ELECTROPHYSIOLOGY | Facility: CLINIC | Age: 69
End: 2024-09-03

## 2024-09-03 PROCEDURE — 93295 DEV INTERROG REMOTE 1/2/MLT: CPT

## 2024-09-03 PROCEDURE — 93296 REM INTERROG EVL PM/IDS: CPT

## 2024-09-20 ENCOUNTER — EMERGENCY (EMERGENCY)
Facility: HOSPITAL | Age: 69
LOS: 1 days | Discharge: HOME | End: 2024-09-20
Attending: EMERGENCY MEDICINE | Admitting: STUDENT IN AN ORGANIZED HEALTH CARE EDUCATION/TRAINING PROGRAM
Payer: MEDICARE

## 2024-09-20 VITALS
OXYGEN SATURATION: 98 % | SYSTOLIC BLOOD PRESSURE: 120 MMHG | TEMPERATURE: 98 F | RESPIRATION RATE: 16 BRPM | WEIGHT: 167.99 LBS | HEIGHT: 65 IN | HEART RATE: 68 BPM | DIASTOLIC BLOOD PRESSURE: 78 MMHG

## 2024-09-20 VITALS
HEART RATE: 64 BPM | RESPIRATION RATE: 18 BRPM | DIASTOLIC BLOOD PRESSURE: 71 MMHG | OXYGEN SATURATION: 97 % | TEMPERATURE: 98 F | SYSTOLIC BLOOD PRESSURE: 120 MMHG

## 2024-09-20 DIAGNOSIS — Z98.89 OTHER SPECIFIED POSTPROCEDURAL STATES: Chronic | ICD-10-CM

## 2024-09-20 DIAGNOSIS — Z95.810 PRESENCE OF AUTOMATIC (IMPLANTABLE) CARDIAC DEFIBRILLATOR: Chronic | ICD-10-CM

## 2024-09-20 DIAGNOSIS — Z95.5 PRESENCE OF CORONARY ANGIOPLASTY IMPLANT AND GRAFT: Chronic | ICD-10-CM

## 2024-09-20 LAB
ALBUMIN SERPL ELPH-MCNC: 3.2 G/DL — LOW (ref 3.3–5)
ALP SERPL-CCNC: 64 U/L — SIGNIFICANT CHANGE UP (ref 40–120)
ALT FLD-CCNC: 25 U/L — SIGNIFICANT CHANGE UP (ref 12–78)
ANION GAP SERPL CALC-SCNC: 6 MMOL/L — SIGNIFICANT CHANGE UP (ref 5–17)
APPEARANCE UR: CLEAR — SIGNIFICANT CHANGE UP
AST SERPL-CCNC: 18 U/L — SIGNIFICANT CHANGE UP (ref 15–37)
BASOPHILS # BLD AUTO: 0.04 K/UL — SIGNIFICANT CHANGE UP (ref 0–0.2)
BASOPHILS NFR BLD AUTO: 0.7 % — SIGNIFICANT CHANGE UP (ref 0–2)
BILIRUB SERPL-MCNC: 0.8 MG/DL — SIGNIFICANT CHANGE UP (ref 0.2–1.2)
BILIRUB UR-MCNC: NEGATIVE — SIGNIFICANT CHANGE UP
BUN SERPL-MCNC: 33 MG/DL — HIGH (ref 7–23)
CALCIUM SERPL-MCNC: 9.5 MG/DL — SIGNIFICANT CHANGE UP (ref 8.5–10.1)
CHLORIDE SERPL-SCNC: 108 MMOL/L — SIGNIFICANT CHANGE UP (ref 96–108)
CO2 SERPL-SCNC: 26 MMOL/L — SIGNIFICANT CHANGE UP (ref 22–31)
COLOR SPEC: YELLOW — SIGNIFICANT CHANGE UP
CREAT SERPL-MCNC: 1.3 MG/DL — SIGNIFICANT CHANGE UP (ref 0.5–1.3)
DIFF PNL FLD: ABNORMAL
EGFR: 60 ML/MIN/1.73M2 — SIGNIFICANT CHANGE UP
EOSINOPHIL # BLD AUTO: 0.16 K/UL — SIGNIFICANT CHANGE UP (ref 0–0.5)
EOSINOPHIL NFR BLD AUTO: 2.7 % — SIGNIFICANT CHANGE UP (ref 0–6)
GLUCOSE SERPL-MCNC: 101 MG/DL — HIGH (ref 70–99)
GLUCOSE UR QL: >=1000 MG/DL
HCT VFR BLD CALC: 46.5 % — SIGNIFICANT CHANGE UP (ref 39–50)
HGB BLD-MCNC: 15.2 G/DL — SIGNIFICANT CHANGE UP (ref 13–17)
IMM GRANULOCYTES NFR BLD AUTO: 0.2 % — SIGNIFICANT CHANGE UP (ref 0–0.9)
KETONES UR-MCNC: NEGATIVE MG/DL — SIGNIFICANT CHANGE UP
LACTATE SERPL-SCNC: 1 MMOL/L — SIGNIFICANT CHANGE UP (ref 0.7–2)
LEUKOCYTE ESTERASE UR-ACNC: NEGATIVE — SIGNIFICANT CHANGE UP
LIDOCAIN IGE QN: 39 U/L — SIGNIFICANT CHANGE UP (ref 13–75)
LYMPHOCYTES # BLD AUTO: 1.26 K/UL — SIGNIFICANT CHANGE UP (ref 1–3.3)
LYMPHOCYTES # BLD AUTO: 21.5 % — SIGNIFICANT CHANGE UP (ref 13–44)
MCHC RBC-ENTMCNC: 30.1 PG — SIGNIFICANT CHANGE UP (ref 27–34)
MCHC RBC-ENTMCNC: 32.7 GM/DL — SIGNIFICANT CHANGE UP (ref 32–36)
MCV RBC AUTO: 92.1 FL — SIGNIFICANT CHANGE UP (ref 80–100)
MONOCYTES # BLD AUTO: 0.68 K/UL — SIGNIFICANT CHANGE UP (ref 0–0.9)
MONOCYTES NFR BLD AUTO: 11.6 % — SIGNIFICANT CHANGE UP (ref 2–14)
NEUTROPHILS # BLD AUTO: 3.7 K/UL — SIGNIFICANT CHANGE UP (ref 1.8–7.4)
NEUTROPHILS NFR BLD AUTO: 63.3 % — SIGNIFICANT CHANGE UP (ref 43–77)
NITRITE UR-MCNC: NEGATIVE — SIGNIFICANT CHANGE UP
NRBC # BLD: 0 /100 WBCS — SIGNIFICANT CHANGE UP (ref 0–0)
PH UR: 6.5 — SIGNIFICANT CHANGE UP (ref 5–8)
PLATELET # BLD AUTO: 127 K/UL — LOW (ref 150–400)
POTASSIUM SERPL-MCNC: 4.1 MMOL/L — SIGNIFICANT CHANGE UP (ref 3.5–5.3)
POTASSIUM SERPL-SCNC: 4.1 MMOL/L — SIGNIFICANT CHANGE UP (ref 3.5–5.3)
PROT SERPL-MCNC: 6.7 G/DL — SIGNIFICANT CHANGE UP (ref 6–8.3)
PROT UR-MCNC: NEGATIVE MG/DL — SIGNIFICANT CHANGE UP
RBC # BLD: 5.05 M/UL — SIGNIFICANT CHANGE UP (ref 4.2–5.8)
RBC # FLD: 14.5 % — SIGNIFICANT CHANGE UP (ref 10.3–14.5)
SODIUM SERPL-SCNC: 140 MMOL/L — SIGNIFICANT CHANGE UP (ref 135–145)
SP GR SPEC: 1.03 — SIGNIFICANT CHANGE UP (ref 1–1.03)
TROPONIN I, HIGH SENSITIVITY RESULT: 10.4 NG/L — SIGNIFICANT CHANGE UP
TROPONIN I, HIGH SENSITIVITY RESULT: 8.9 NG/L — SIGNIFICANT CHANGE UP
UROBILINOGEN FLD QL: 1 MG/DL — SIGNIFICANT CHANGE UP (ref 0.2–1)
WBC # BLD: 5.85 K/UL — SIGNIFICANT CHANGE UP (ref 3.8–10.5)
WBC # FLD AUTO: 5.85 K/UL — SIGNIFICANT CHANGE UP (ref 3.8–10.5)

## 2024-09-20 PROCEDURE — 99285 EMERGENCY DEPT VISIT HI MDM: CPT

## 2024-09-20 PROCEDURE — 96375 TX/PRO/DX INJ NEW DRUG ADDON: CPT

## 2024-09-20 PROCEDURE — 71045 X-RAY EXAM CHEST 1 VIEW: CPT | Mod: 26

## 2024-09-20 PROCEDURE — 99285 EMERGENCY DEPT VISIT HI MDM: CPT | Mod: 25

## 2024-09-20 PROCEDURE — 84484 ASSAY OF TROPONIN QUANT: CPT

## 2024-09-20 PROCEDURE — 81001 URINALYSIS AUTO W/SCOPE: CPT

## 2024-09-20 PROCEDURE — 76705 ECHO EXAM OF ABDOMEN: CPT | Mod: 26

## 2024-09-20 PROCEDURE — 36415 COLL VENOUS BLD VENIPUNCTURE: CPT

## 2024-09-20 PROCEDURE — 85025 COMPLETE CBC W/AUTO DIFF WBC: CPT

## 2024-09-20 PROCEDURE — 76705 ECHO EXAM OF ABDOMEN: CPT

## 2024-09-20 PROCEDURE — 93005 ELECTROCARDIOGRAM TRACING: CPT

## 2024-09-20 PROCEDURE — 71045 X-RAY EXAM CHEST 1 VIEW: CPT

## 2024-09-20 PROCEDURE — 83690 ASSAY OF LIPASE: CPT

## 2024-09-20 PROCEDURE — 83605 ASSAY OF LACTIC ACID: CPT

## 2024-09-20 PROCEDURE — 80053 COMPREHEN METABOLIC PANEL: CPT

## 2024-09-20 PROCEDURE — 96374 THER/PROPH/DIAG INJ IV PUSH: CPT

## 2024-09-20 PROCEDURE — 93010 ELECTROCARDIOGRAM REPORT: CPT

## 2024-09-20 RX ORDER — ASPIRIN 81 MG
324 TABLET, DELAYED RELEASE (ENTERIC COATED) ORAL ONCE
Refills: 0 | Status: COMPLETED | OUTPATIENT
Start: 2024-09-20 | End: 2024-09-20

## 2024-09-20 RX ORDER — FAMOTIDINE 10 MG/ML
20 INJECTION INTRAVENOUS ONCE
Refills: 0 | Status: COMPLETED | OUTPATIENT
Start: 2024-09-20 | End: 2024-09-20

## 2024-09-20 RX ORDER — ACETAMINOPHEN 325 MG/1
1000 TABLET ORAL ONCE
Refills: 0 | Status: COMPLETED | OUTPATIENT
Start: 2024-09-20 | End: 2024-09-20

## 2024-09-20 RX ADMIN — Medication 324 MILLIGRAM(S): at 02:55

## 2024-09-20 RX ADMIN — ACETAMINOPHEN 400 MILLIGRAM(S): 325 TABLET ORAL at 02:55

## 2024-09-20 RX ADMIN — FAMOTIDINE 20 MILLIGRAM(S): 10 INJECTION INTRAVENOUS at 02:56

## 2024-09-20 NOTE — ED PROVIDER NOTE - OBJECTIVE STATEMENT
68-year-old male history of hypertension, hyperlipidemia, history of MI 10 years ago with stent presenting with epigastric pain that started at back 8 PM about an hour after having dinner.  States when it started it was a 10 out of 10, nonradiating, no nausea or vomiting.  He took omeprazole and now it is about a 6 out of 10.  Denies chest pain or shortness of breath. 68-year-old male history of hypertension, hyperlipidemia, history of MI 10 years ago with stent presenting with epigastric pain that started at back 8 PM about an hour after having dinner.  States when it started it was a 10 out of 10, non-radiating, no nausea or vomiting.  He took omeprazole and now it is about a 6 out of 10.  Denies chest pain or shortness of breath.

## 2024-09-20 NOTE — ED ADULT NURSE NOTE - CHIEF COMPLAINT QUOTE
pt ambulatory to triage aox4 c/o upper epigastric pain that started after dinner tonight @ 1900. denies n/v/d, chest pain, sob, difficulty breathing. pt states pain feels like indigestion. hx of MI and stent 10 yrs ago.

## 2024-09-20 NOTE — ED PROVIDER NOTE - PATIENT PORTAL LINK FT
no
You can access the FollowMyHealth Patient Portal offered by [facility] by registering at the following website: [email or web address]. By joining [employer] [facility] [employer]’s FollowMyHealth portal, you will also be able to view your health information using other applications (apps) compatible with our system.

## 2024-09-20 NOTE — ED ADULT NURSE NOTE - OBJECTIVE STATEMENT
Pt is alert oriented X3, no acute  distress. Pt presented to the ED for abdominal pain  with onset late last night after eating diner.  Pt pain now is 6/10. Past medical history MI with stent  10years ago.

## 2024-09-20 NOTE — CONSULT NOTE ADULT - SUBJECTIVE AND OBJECTIVE BOX
[facility]         Андрей Cardenas, José Miguel, Joshua, Magnus, Nubia Mcrae        [phone] (office)    Reason for Consult: chest pain    Interval HPI: Patient seen and examined at bedside. No acute events overnight.     HPI: Patient is an 67 yo M with PMH of HTN, HLD, CAD s/p MI with stent placement in 2013, HFrEF w/ PVT, and GERD who presents to the ED with epigastric pain. Patient stated that he had epigastric pain that began at 8PM last night after dinner where he stated he ate a cucumber too fast and he had pain at the top of his stomach. He rated it as 10/10 in severity, nonradiating, and stated that it felt like the GERD pain he has from time to time. He stated he took an omeprazole before he came to the ED which helped bring the pain down to 6/10 in severity. On exam, he rates the pain as 2/10 and denies CP, palpitations, SOB, n/v/d/c.     Pt follows with Dr. Dial for cardiology.      PAST MEDICAL & SURGICAL HISTORY:  MI (myocardial infarction)      Stented coronary artery      HTN (hypertension)      Hyperlipidemia      CAD (coronary artery disease)      Hypertension      MI (myocardial infarction)      History of tonsillectomy      S/P implantation of automatic cardioverter/defibrillator (AICD)      History of coronary artery stent placement  x1          SOCIAL HISTORY: No active tobacco, alcohol or illicit drug use    FAMILY HISTORY:  FH: heart attack        Home Medications:  aspirin 81 mg oral tablet: 1 tab(s) orally once a day (15 Feb 2021 06:59)  atorvastatin 80 mg oral tablet: 1 tab(s) orally once a day (at bedtime) (15 Feb 2021 06:59)  lisinopril 20 mg oral tablet: 1 tab(s) orally once a day (15 Feb 2021 06:59)  omeprazole 40 mg oral delayed release capsule: 1 cap(s) orally once a day (15 Feb 2021 06:59)  Vitamin D3 1000 intl units (25 mcg) oral tablet: 1 tab(s) orally once a day (15 Feb 2021 06:59)      MEDICATIONS  (STANDING):    MEDICATIONS  (PRN):      Allergies    No Known Allergies    Intolerances        REVIEW OF SYSTEMS: Negative except as per HPI.    VITAL SIGNS:   Vital Signs Last 24 Hrs  T(C): 36.4 (20 Sep 2024 07:40), Max: 36.5 (20 Sep 2024 00:46)  T(F): 97.6 (20 Sep 2024 07:40), Max: 97.7 (20 Sep 2024 00:46)  HR: 67 (20 Sep 2024 07:40) (63 - 68)  BP: 114/64 (20 Sep 2024 07:40) (107/67 - 120/78)  BP(mean): --  RR: 18 (20 Sep 2024 07:40) (16 - 18)  SpO2: 97% (20 Sep 2024 07:40) (97% - 98%)    Parameters below as of 20 Sep 2024 07:40  Patient On (Oxygen Delivery Method): room air      PHYSICAL EXAM:  Constitutional: NAD, well-developed  HEENT NC/AT, moist mucous membranes  Pulmonary: Non-labored, breath sounds are clear bilaterally, no wheezing, rales or rhonchi  Cardiovascular: +S1, S2, RRR, no murmur  Gastrointestinal: Soft, nontender, nondistended, normoactive bowel sounds  Extremities: No peripheral edema   Neurological: Alert, strength and sensitivity are grossly intact  Skin: No obvious lesions/rashes  Psych: Mood & affect appropriate    LABS: All Labs Reviewed:                        15.2   5.85  )-----------( 127      ( 20 Sep 2024 01:27 )             46.5     20 Sep 2024 01:27    140    |  108    |  33     ----------------------------<  101    4.1     |  26     |  1.30     Ca    9.5        20 Sep 2024 01:27    TPro  6.7    /  Alb  3.2    /  TBili  0.8    /  DBili  x      /  AST  18     /  ALT  25     /  AlkPhos  64     20 Sep 2024 01:27      EKG: Sinus bradycardia with left axis deviation, old septal infarct    Radiology"  RUQ U/S showed gallbladder sludge.    ECHO 12/2022:   Observations:  Mitral Valve: Thickened and teathered  mitral valve.  Moderate-severe mitral regurgitation.  Aortic Valve/Aorta: Calcified trileaflet aortic valve with  normal opening. Peak transaortic valve gradient equals 8 mm  Hg, mean transaortic valve gradient equals 5 mm Hg, aortic  valve velocity time integral equals 32 cm. Mild-moderate  aortic regurgitation. Peak left ventricular outflow tract  gradient equals 3 mm Hg, mean gradient is equal to 2 mm Hg,  LVOT velocity time integral equals 21 cm.  Aortic Root: 3.1 cm.  Ascending Aorta: 3.3 cm.  LVOT diameter: 2.4 cm.  Left Atrium: Moderately dilated left atrium.  LA volume  index = 42 cc/m2.  Left Ventricle: Severe segmental left ventricular systolic  dysfunction.  Akinesis of the mid and apical septum,  anteroseptum, anterior wall, apex, distal inferolateral  wall.  Inferoapical dyskinesis. No left ventricular  thrombus. Mild left ventricular enlargement. Mild diastolic  dysfunction (Stage I).  Right Heart: Normal right atrium. The right ventricle is  not well visualized; grossly normal right ventricular  systolic function.   A device wire is noted in the right  heart. Normal tricuspid valve. Mild tricuspid  regurgitation. Normal pulmonic valve. Minimal pulmonic  regurgitation.  Pericardium/Pleura: Small pericardial effusion.   No  echocardiographic evidence of pericardial tamponade.  Hemodynamic: Estimated right ventricular systolic pressure  equals 31 mm Hg, assuming right atrial pressure equals 8 mm  Hg, consistent with normal pulmonary pressures.  ------------------------------------------------------------------------  Conclusions:  1. Thickened and teathered  mitral valve. Moderate-severe  mitral regurgitation.  2. Mild-moderate aortic regurgitation.  3. Moderately dilated left atrium.  LA volume index = 42  cc/m2.  4. Mild left ventricular enlargement.  5. Severe segmental left ventricular systolic dysfunction.  Akinesis of the mid and apical septum,  anteroseptum,  anterior wall, apex, distal inferolateral wall.  Inferoapical dyskinesis. No left ventricular thrombus.  6. The right ventricle is not well visualized; grossly  normal right ventricular systolic function.   A device wire  is noted in the right heart.  7. Small pericardial effusion.   No echocardiographic  evidence of pericardial tamponade.

## 2024-09-20 NOTE — ED PROVIDER NOTE - NSFOLLOWUPINSTRUCTIONS_ED_ALL_ED_FT
Follow-up with your primary care doctor and cardiologist next week.  Return to the emergency room if your symptoms worsen.    Epigastric Pain    WHAT YOU NEED TO KNOW:    What do I need to know about epigastric pain? Epigastric pain is felt in the middle of the upper abdomen, between the ribs and the bellybutton. The pain may be mild or severe. Pain may spread from or to another part of your body. Epigastric pain may be a sign of a serious health problem that needs to be treated.    What causes epigastric pain? The cause of your pain may not be known. The following are common causes:    Inflammation of your stomach, liver, pancreas, or intestines    Heart problems, such as a heart attack    Digestion problems, such as indigestion, GERD, or lactose intolerance    Medical conditions, such as an ulcer, a hernia, irritable bowel syndrome (IBS), or cancer    A blockage in your bowels or gallbladder    A bladder infection    An injury or previous surgery in your abdomen  What other signs and symptoms may I have with epigastric pain? Signs and symptoms will depend on what is causing your pain.    Nausea, vomiting, bloating, constipation, or diarrhea    Loss of appetite, weight loss, feeling of fullness as you start to eat    Movement relieves the pain or makes it worse, or only certain positions are comfortable    Pain when you eat, or pain that is relieved when you eat or have a bowel movement    Sore throat or a hoarse voice  How is epigastric pain diagnosed and treated? Your healthcare provider will feel your abdomen to see if it is tender or rigid. Your provider may change or stop any medicine you are taking that is causing your pain. Your pain may go away without treatment, or you may need any of the following:    Medicines may be given to treat pain or stop vomiting. You may also need medicines to reduce or control stomach acid, or treat an infection.    Blood or urine tests may show problems such as infection or inflammation. The tests may also show how well your liver is working.    An x-ray is used to check your kidneys and bladder.    An ultrasound is used to check your gallbladder for stones or other blockage.    A bowel movement sample may be tested for blood.  How can I manage my symptoms?    Keep a record of your symptoms. Include when the pain starts, how long it lasts, and if it is sharp or dull. Also include any foods you ate or activities you did before the pain started. Keep track of anything that helped the pain.    Eat a variety of healthy foods. Healthy foods include fruits, vegetables, whole-grain breads, low-fat dairy products, beans, lean meats, and fish. Ask if you need to be on a special diet. Certain foods may cause your pain, such as alcohol or foods that are high in fat. You may need to eat smaller meals and to eat more often than usual.    Drink liquids as directed. Ask how much liquid to drink each day and which liquids are best for you. Do not have drinks that contain alcohol or caffeine.  Call 911 for any of the following:    You have any of the following signs of a heart attack:  Squeezing, pressure, or pain in your chest    You may also have any of the following:  Discomfort or pain in your back, neck, jaw, stomach, or arm    Shortness of breath    Nausea or vomiting    Lightheadedness or a sudden cold sweat    You have severe pain that radiates to your jaw or back.  When should I seek immediate care?    You have severe pain that starts suddenly and quickly gets worse.    You cannot have a bowel movement and are vomiting.    You vomit or cough up blood.    You see blood in your urine or bowel movement.    You feel drowsy and your breathing is slower than usual.  When should I contact my healthcare provider?    You have a fever or chills.    You have yellowing of your skin or the whites of your eyes.    You vomit often or several times in a row.    You lose weight without trying.    You have symptoms for longer than 2 weeks.    You have questions or concerns about your condition or care.  CARE AGREEMENT:    You have the right to help plan your care. Learn about your health condition and how it may be treated. Discuss treatment options with your healthcare providers to decide what care you want to receive. You always have the right to refuse treatment.

## 2024-09-20 NOTE — ED PROVIDER NOTE - CARE PROVIDER_API CALL
Kellie Bear [facility]  Internal Medicine  [address] [place] [id number]  Phone: [phone]  Fax: [phone]  Established Patient  Follow Up Time: 1-3 Days

## 2024-09-20 NOTE — ED PROVIDER NOTE - CLINICAL SUMMARY MEDICAL DECISION MAKING FREE TEXT BOX
68-year-old male with epigastric pain since about 8 PM last night. Pt with extensive cardiac hx. Will eval for ACS, gastritis, cholecystitis, pancreatitis  Will get labs, EKG, CXR  Will give ASA, pepcid, Tylenol  Will reassess.

## 2024-09-20 NOTE — ED ADULT NURSE REASSESSMENT NOTE - NS ED NURSE REASSESS COMMENT FT1
report received from previous RN. received patient resting in stretcher,. says he feels better and his abdominal pain is now a 2/10 and tolerable. well appearing, awaiting cardiology consult

## 2024-09-20 NOTE — ED PROVIDER NOTE - IV ALTEPLASE INCLUSION HIDDEN
"Subjective   Patient ID: Jung Trevino is a 8 y.o. female who presents for Sore Throat.  Today she is  accompanied by mother.     Here with concerns about sore throat and cough .  She does have sore throat since the beginning of week , since Monday, for 4-5 days .  No fever.  Not better with fluids.  She does have mild cough as well.  She does have h/o strep twice in the spring.      Sore Throat  Associated symptoms include coughing and a sore throat. Pertinent negatives include no abdominal pain, fever, headaches or rash.       Review of Systems   Constitutional:  Negative for activity change, appetite change and fever.   HENT:  Positive for sore throat.    Respiratory:  Positive for cough.    Gastrointestinal:  Negative for abdominal pain.   Skin:  Negative for rash.   Neurological:  Negative for headaches.       Objective   /67   Pulse 101   Temp 36.3 °C (97.3 °F)   Resp 18   Ht 1.505 m (4' 11.25\")   Wt (!) 65.3 kg   SpO2 98%   BMI 28.83 kg/m²   BSA: 1.65 meters squared  Growth percentiles: >99 %ile (Z= 2.68) based on CDC (Girls, 2-20 Years) Stature-for-age data based on Stature recorded on 9/22/2023. >99 %ile (Z= 3.04) based on CDC (Girls, 2-20 Years) weight-for-age data using vitals from 9/22/2023.     Physical Exam  Constitutional:       Appearance: Normal appearance.   HENT:      Head: Normocephalic.      Right Ear: Tympanic membrane normal.      Left Ear: Tympanic membrane normal.      Nose: Nose normal.      Mouth/Throat:      Mouth: Mucous membranes are moist.      Pharynx: Posterior oropharyngeal erythema present. No oropharyngeal exudate.   Eyes:      Pupils: Pupils are equal, round, and reactive to light.   Cardiovascular:      Rate and Rhythm: Normal rate and regular rhythm.      Heart sounds: Normal heart sounds. No murmur heard.  Pulmonary:      Effort: Pulmonary effort is normal.      Breath sounds: Normal breath sounds.   Musculoskeletal:      Cervical back: Normal range of motion. " show   Lymphadenopathy:      Cervical: No cervical adenopathy.   Neurological:      Mental Status: She is alert.         Assessment/Plan   Problem List Items Addressed This Visit       Acute pharyngitis - Primary     Sip on warm and cold fluids - warm drinks like tea +/-honey , chicken soup or cold ice water, Pedialyte, popsicles ... Try both and see which one works better for your child  Gargle with salt water - dissolve 1/2 teaspoon of salt or similar amount of baking soda in a glass of warm water. Gargle ( don't swallow ) concoction every 3 hours for an all natural sore throat remedy.  OTC pain relievers - Acetaminophen, Ibuprofen   Steam and humidity - hot steamy shower, humidifier in room  Rest - don't underestimate resting your body and voice          Relevant Orders    POCT rapid strep A manually resulted (Completed)    Group A Streptococcus, Culture

## 2024-09-20 NOTE — CONSULT NOTE ADULT - ATTENDING COMMENTS
Patient is an 69 yo M with PMH of HTN, HLD, CAD s/p MI with stent placement in 2013, HFrEF w/ PVT, and GERD who presents to the ED with epigastric pain. Cardiology was consulted for chest pain.    #Volume    - Past echo 12/2022 per Dr. Dial as above  - no evidence of significant volume overload    #Ischemia   - No clear evidence of acute ischemia, patient is not complaining of any cardiac symptoms at this time.  - Troponin negative x2, no need to repeat  - EKG stable as above  - Hx of CAD: Continue home aspirin, lipitor, statin, farxiga.    #Arrhythmia  - EKG stable as above  - Monitor and replete lytes, keep K>4, Mg>2.    #HTN  - BP stable currently  - Continue home toprol, entresto, spironolactone.  - hemodynamically stable    Plan:  - Abdominal pain likely 2/2 GERD. Patient to follow up closely with PCP and cardiologist.

## 2024-09-20 NOTE — ED PROVIDER NOTE - PROGRESS NOTE DETAILS
Patient reevaluated at bedside.  States his pain is still about a 4-5 out of 10.  States the medications did not help him that much.  Repeat troponin is negative.  Repeat EKG is essentially unchanged from the initial.  Will hold for cardiology evaluation in the morning.  Patient is agreeable with the plan. pt seen by cards, no further inpatient workup at this tiem

## 2024-09-20 NOTE — ED ADULT TRIAGE NOTE - WEIGHT IN LBS
Critical care interdisciplinary rounds held on 07/24/2020. Following members present, Pharmacy, Diabetes Treatment, Case Management, Respiratory Therapy, Physical Therapy and Nutrition. Led by Finley Dakins. Rianna White RN and Dr. Xochitl Colvin. Plan of care discussed. See clinical pathway for plan of care and interventions and desired outcomes. Remains a Critical Care patient. 167.9

## 2024-09-20 NOTE — ED PROVIDER NOTE - OTHER FINDINGS
Compared with prior EKGs from 2023, morphology of ST segment appears similar to prior no significant change from prior

## 2024-09-20 NOTE — ED ADULT TRIAGE NOTE - CHIEF COMPLAINT QUOTE
pt ambulatory to triage aox4 c/o upper epigastric pain that started after dinner tonight. denies n/v/d, chest pain, sob, difficulty breathing. pt states pain feels like indigestion. hx of MI and stent 10 yrs ago. pt ambulatory to triage aox4 c/o upper epigastric pain that started after dinner tonight @ 1900. denies n/v/d, chest pain, sob, difficulty breathing. pt states pain feels like indigestion. hx of MI and stent 10 yrs ago.

## 2024-09-30 ENCOUNTER — NON-APPOINTMENT (OUTPATIENT)
Age: 69
End: 2024-09-30

## 2024-09-30 NOTE — HISTORY OF PRESENT ILLNESS
[FreeTextEntry1] : Mr. Farr ia a 69yo man w/ hx of STEMI 12/2013 s/p BMS to proximal LAD, and ICM HFrEF ~20% s/p ICD repeat EF 36%, gen change 2/2020, VT s/p VT ablation 2/2020, prior provoked RUE DVT s/p AC now her for f/up.  Last seen by Dr. Macedo in 3/2024. At the time he was doing well on stable doses of GDMT.  TTE 6/3/20: EF 20%, MAC, Moderate MR, Moderately dilated LA 47, Severe segmental LV systolic function, moderate diastolic dysfunction TTE 12/9/22: LVEF 36%, LVIDd 5.7, Mod-Severe MR, Mild-Mod AR, Mod dilated LA volume index 42 cc/m2 Severe segmental LV systolic function with akinesis of mid and apical septum, anteroseptum, anterior wall, apex, distal inferolateral wall, inferoapical dyskinesis, RVSP 31, small pericardial effusion  Current Meds: ASA/Atorva, Farxiga, 10, Entresto 49/51 BID, Spironolactone 25 mg Metoprolol 50 mg QD (has had issues with up-titration in the past)  Lipid Panel 12/14/2023: Tg 74, Cholesterol 117, HDL 56, LDL 46,

## 2024-09-30 NOTE — ASSESSMENT
[FreeTextEntry1] : Mr. Farr ia a 69yo man w/ hx of STEMI 12/2013 s/p BMS to proximal LAD, and ICM HFrEF ~20% s/p ICD repeat EF 36%, gen change 2/2020, VT s/p VT ablation 2/2020, prior provoked RUE DVT s/p AC now her for f/up.  #iCMY w/ HFrEF #Mod MR - ASA/Atrova - Continue Entresto 49-51mg BID - Continue Metop succ 50mg qd - Continue Spironolactone 25mg qd - Continue Dapagliflozin 10mg qd  #CAD Last liipids from 12/2023 w/ TChol 117, HDL 56, LDL 46, TGs 74. On Atorva 80mg - Continue ASA 81mg qd - Continue Atrova 80mg qd - Check lipids qyearly  RTC in ###

## 2024-09-30 NOTE — ASSESSMENT
[FreeTextEntry1] : Mr. Farr ia a 67yo man w/ hx of STEMI 12/2013 s/p BMS to proximal LAD, and ICM HFrEF ~20% s/p ICD repeat EF 36%, gen change 2/2020, VT s/p VT ablation 2/2020, prior provoked RUE DVT s/p AC now her for f/up.  #iCMY w/ HFrEF #Mod MR - ASA/Atrova - Continue Entresto 49-51mg BID - Continue Metop succ 50mg qd - Continue Spironolactone 25mg qd - Continue Dapagliflozin 10mg qd  #CAD Last liipids from 12/2023 w/ TChol 117, HDL 56, LDL 46, TGs 74. On Atorva 80mg - Continue ASA 81mg qd - Continue Atrova 80mg qd - Check lipids qyearly  RTC in ###

## 2024-10-01 ENCOUNTER — APPOINTMENT (OUTPATIENT)
Dept: CARDIOLOGY | Facility: HOSPITAL | Age: 69
End: 2024-10-01

## 2024-10-01 ENCOUNTER — OUTPATIENT (OUTPATIENT)
Dept: OUTPATIENT SERVICES | Facility: HOSPITAL | Age: 69
LOS: 1 days | End: 2024-10-01
Payer: MEDICARE

## 2024-10-01 ENCOUNTER — NON-APPOINTMENT (OUTPATIENT)
Age: 69
End: 2024-10-01

## 2024-10-01 VITALS
DIASTOLIC BLOOD PRESSURE: 64 MMHG | WEIGHT: 168 LBS | OXYGEN SATURATION: 98 % | SYSTOLIC BLOOD PRESSURE: 100 MMHG | HEART RATE: 62 BPM | HEIGHT: 65 IN | BODY MASS INDEX: 27.99 KG/M2

## 2024-10-01 DIAGNOSIS — I50.20 UNSPECIFIED SYSTOLIC (CONGESTIVE) HEART FAILURE: ICD-10-CM

## 2024-10-01 DIAGNOSIS — Z95.5 PRESENCE OF CORONARY ANGIOPLASTY IMPLANT AND GRAFT: Chronic | ICD-10-CM

## 2024-10-01 DIAGNOSIS — Z95.810 PRESENCE OF AUTOMATIC (IMPLANTABLE) CARDIAC DEFIBRILLATOR: Chronic | ICD-10-CM

## 2024-10-01 DIAGNOSIS — Z98.89 OTHER SPECIFIED POSTPROCEDURAL STATES: Chronic | ICD-10-CM

## 2024-10-01 DIAGNOSIS — N52.9 MALE ERECTILE DYSFUNCTION, UNSPECIFIED: ICD-10-CM

## 2024-10-01 DIAGNOSIS — I25.10 ATHEROSCLEROTIC HEART DISEASE OF NATIVE CORONARY ARTERY W/OUT ANGINA PECTORIS: ICD-10-CM

## 2024-10-01 DIAGNOSIS — I10 ESSENTIAL (PRIMARY) HYPERTENSION: ICD-10-CM

## 2024-10-01 DIAGNOSIS — I25.10 ATHEROSCLEROTIC HEART DISEASE OF NATIVE CORONARY ARTERY WITHOUT ANGINA PECTORIS: ICD-10-CM

## 2024-10-01 DIAGNOSIS — I42.9 CARDIOMYOPATHY, UNSPECIFIED: ICD-10-CM

## 2024-10-01 PROCEDURE — G0463: CPT

## 2024-10-01 PROCEDURE — 93005 ELECTROCARDIOGRAM TRACING: CPT

## 2024-10-02 DIAGNOSIS — I50.20 UNSPECIFIED SYSTOLIC (CONGESTIVE) HEART FAILURE: ICD-10-CM

## 2024-10-02 DIAGNOSIS — I10 ESSENTIAL (PRIMARY) HYPERTENSION: ICD-10-CM

## 2024-10-02 DIAGNOSIS — I42.9 CARDIOMYOPATHY, UNSPECIFIED: ICD-10-CM

## 2024-10-08 ENCOUNTER — EMERGENCY (EMERGENCY)
Facility: HOSPITAL | Age: 69
LOS: 1 days | Discharge: HOME | End: 2024-10-08
Attending: STUDENT IN AN ORGANIZED HEALTH CARE EDUCATION/TRAINING PROGRAM | Admitting: STUDENT IN AN ORGANIZED HEALTH CARE EDUCATION/TRAINING PROGRAM
Payer: MEDICARE

## 2024-10-08 VITALS
HEART RATE: 74 BPM | DIASTOLIC BLOOD PRESSURE: 79 MMHG | RESPIRATION RATE: 19 BRPM | OXYGEN SATURATION: 100 % | WEIGHT: 167.99 LBS | SYSTOLIC BLOOD PRESSURE: 145 MMHG | HEIGHT: 65 IN | TEMPERATURE: 98 F

## 2024-10-08 DIAGNOSIS — Z95.5 PRESENCE OF CORONARY ANGIOPLASTY IMPLANT AND GRAFT: Chronic | ICD-10-CM

## 2024-10-08 DIAGNOSIS — Z98.89 OTHER SPECIFIED POSTPROCEDURAL STATES: Chronic | ICD-10-CM

## 2024-10-08 DIAGNOSIS — Z95.810 PRESENCE OF AUTOMATIC (IMPLANTABLE) CARDIAC DEFIBRILLATOR: Chronic | ICD-10-CM

## 2024-10-08 PROCEDURE — 93010 ELECTROCARDIOGRAM REPORT: CPT

## 2024-10-08 PROCEDURE — 93005 ELECTROCARDIOGRAM TRACING: CPT

## 2024-10-08 PROCEDURE — 99284 EMERGENCY DEPT VISIT MOD MDM: CPT

## 2024-10-08 PROCEDURE — 99283 EMERGENCY DEPT VISIT LOW MDM: CPT | Mod: 25

## 2024-10-08 NOTE — ED PROVIDER NOTE - NSFOLLOWUPINSTRUCTIONS_ED_ALL_ED_FT
Please continue to follow-up with your primary care provider regards to today's event and your cardiologist    Pain in the abdomen (abdominal pain) can be caused by many things. In most cases, it gets better with no treatment or by being treated at home. But in some cases, it can be serious.    Your health care provider will ask questions about your medical history and do a physical exam to try to figure out what is causing your pain.    Follow these instructions at home:  Medicines    Take over-the-counter and prescription medicines only as told by your provider.  Do not take medicines that help you poop (laxatives) unless told by your provider.  General instructions    Watch your condition for any changes.  Drink enough fluid to keep your pee (urine) pale yellow.  Contact a health care provider if:  Your pain changes, gets worse, or lasts longer than expected.  You have severe cramping or bloating in your abdomen, or you vomit.  Your pain gets worse with meals, after eating, or with certain foods.  You are constipated or have diarrhea for more than 2–3 days.  You are not hungry, or you lose weight without trying.  You have signs of dehydration. These may include:  Dark pee, very little pee, or no pee.  Cracked lips or dry mouth.  Sleepiness or weakness.  You have pain when you pee (urinate) or poop.  Your abdominal pain wakes you up at night.  You have blood in your pee.  You have a fever.  Get help right away if:  You cannot stop vomiting.  Your pain is only in one part of the abdomen. Pain on the right side could be caused by appendicitis.  You have bloody or black poop (stool), or poop that looks like tar.  You have trouble breathing.  You have chest pain.  These symptoms may be an emergency. Get help right away. Call 911.  Do not wait to see if the symptoms will go away.  Do not drive yourself to the hospital.  This information is not intended to replace advice given to you by your health care provider. Make sure you discuss any questions you have with your health care provider.

## 2024-10-08 NOTE — ED ADULT TRIAGE NOTE - CHIEF COMPLAINT QUOTE
pt. came in w/ c/o shivering started today while walking outside, denies chest pain, SOB and dizziness, alert and oriented, known HTN on meds.

## 2024-10-08 NOTE — ED ADULT NURSE NOTE - ED STAT RN HANDOFF DETAILS
D/c instructions reviewed, verbalized understanding. VS stable. Pt ambulatory, left ED in stable condition.

## 2024-10-08 NOTE — ED PROVIDER NOTE - PATIENT PORTAL LINK FT
You can access the FollowMyHealth Patient Portal offered by [facility] by registering at the following website: [email or web address]. By joining [facility]’s FollowMyHealth portal, you will also be able to view your health information using other applications (apps) compatible with our system.

## 2024-10-08 NOTE — ED ADULT NURSE NOTE - NSFALLUNIVINTERV_ED_ALL_ED
Bed/Stretcher in lowest position, wheels locked, appropriate side rails in place/Call bell, personal items and telephone in reach/Instruct patient to call for assistance before getting out of bed/chair/stretcher/Non-slip footwear applied when patient is off stretcher/[place]et to call system/Physically safe environment - no spills, clutter or unnecessary equipment/Purposeful proactive rounding/Room/bathroom lighting operational, light cord in reach

## 2024-10-08 NOTE — ED PROVIDER NOTE - CLINICAL SUMMARY MEDICAL DECISION MAKING FREE TEXT BOX
[facility]   69-year-old male chief complaint of reportedly abnormal EKG.  Patient has a history of hypertension hyperlipidemia prior MI 10 years ago follows with cardiology every 3 months patient notes he presented to urgent care today for epigastric pain which was seen a couple days for such they did an EKG and noted it was abnormal with T wave inversions in V2-V4.  Patient states he knows about an abnormal EKG urgent care informed him to go to the hospital patient notes he was shivering a little bit from the walk to the emergency department from his car but is now feeling fine no chest pain or shortness of breath right now    GENERAL: Awake, alert, NAD  HEENT: NC/AT, moist mucous membranes, PERRL, EOMI  LUNGS: non labored breathing   ABDOMEN: Soft, , non tender, non distended, no rebound, no guarding  EXT: No edema,, no deformities.  NEURO: A&Ox3. Moving all extremities.  SKIN: Warm and dry. No rash.  PSYCH: Normal affect.   Given history and physical exam compared current EKG that he received from urgent care to priors and priors showed the same T wave malformations.  Will obtain EKG here with discharge home

## 2024-10-08 NOTE — ED ADULT NURSE NOTE - SUICIDE SCREENING QUESTION 2
Patient wife called in today requesting a call back from Abby RN, patient wife was advised that you are assisting another patient and will reach out to her as soon as you are able.    Please call her at [phone]    Thank you    No

## 2024-10-08 NOTE — ED ADULT NURSE NOTE - OBJECTIVE STATEMENT
Pt arrived to ED sent by  for abnl EKG. Pt reports he went to  for abd pain x few days. EKG showed inverted T-waves in V2-V4. Pt noted shivering during his walk from his car to ED. In ED no complaints at this time. Denies CP/SOB/N/V/D/f/abd pain/urinary sx. Bed in lowest position, pt ambulatory. Safety maintained, nursing care ongoing.

## 2024-10-09 VITALS
RESPIRATION RATE: 18 BRPM | DIASTOLIC BLOOD PRESSURE: 65 MMHG | TEMPERATURE: 98 F | OXYGEN SATURATION: 97 % | SYSTOLIC BLOOD PRESSURE: 105 MMHG | HEART RATE: 72 BPM

## 2024-10-30 NOTE — ED ADULT NURSE NOTE - CHPI ED NUR TIMING2
Patient Name: Emile Narvaez II   YOB: 1965   Patient Status: New patient,   Chief Complaint: Fall (Syncopal episode 5 days ago, blacked out and fell; Tinnitus, nausea, photophobia, headache, Right knee pain, x5 days)      ____________________________________________________________________________________________    External Records Reviewed: None    Limitation to History: None    Outside Historian: None    SUBJECTIVE/OBJECTIVE:  Emile Narvaez II is a 59 y.o. male presents with complaint of nausea, tinnitus, and headache following a syncopal episode. Symptoms began 5 days ago after he passed out at home.  He reports he had been having a drink and smoking a cigar on his patio and suddenly didn't feel well so decided to go in and lay down but before he could reach his bed he passed out waking on the floor an unknown time later.  He reports he felt a little like he was having a anxiety attack before he passed out feeling like he couldn't get a full breath and his chest was tight but without pain - all of those things had resolved when he woke on the floor. He reports since the episode he has had intermittent nausea without vomiting, intermittent tinnitus with a feeling of ear fullness, and occasional headache. The patient denies current chest pain/ tightness, shortness of breath, dizziness, palpitations, head contusion or obvious injury, change in vision or speech.  He denies use of blood thinners.  Patient reports he has high cholesterol but denies HTN or DM with family history of both.  Patient declines ECG or recommendation to go to the ER emergently.  He would like his ears cleaned out today but nothing else.  No other acute symptoms reported at this time. Patient reports that he did also hurt his right knee at the time of fall but has already seen an Ortho.          PAST MEDICAL HISTORY:   Medical: Pt  has a past medical history of Hyperlipidemia.  Surgical: Pt  has no past surgical history  unknown

## 2024-11-05 ENCOUNTER — APPOINTMENT (OUTPATIENT)
Dept: CARDIOLOGY | Facility: HOSPITAL | Age: 69
End: 2024-11-05

## 2024-11-05 ENCOUNTER — OUTPATIENT (OUTPATIENT)
Dept: OUTPATIENT SERVICES | Facility: HOSPITAL | Age: 69
LOS: 1 days | End: 2024-11-05
Payer: MEDICARE

## 2024-11-05 VITALS
HEART RATE: 72 BPM | HEIGHT: 65 IN | SYSTOLIC BLOOD PRESSURE: 120 MMHG | OXYGEN SATURATION: 97 % | DIASTOLIC BLOOD PRESSURE: 72 MMHG | BODY MASS INDEX: 27.99 KG/M2 | WEIGHT: 168 LBS

## 2024-11-05 DIAGNOSIS — I42.9 CARDIOMYOPATHY, UNSPECIFIED: ICD-10-CM

## 2024-11-05 DIAGNOSIS — I25.10 ATHEROSCLEROTIC HEART DISEASE OF NATIVE CORONARY ARTERY W/OUT ANGINA PECTORIS: ICD-10-CM

## 2024-11-05 DIAGNOSIS — Z95.810 PRESENCE OF AUTOMATIC (IMPLANTABLE) CARDIAC DEFIBRILLATOR: Chronic | ICD-10-CM

## 2024-11-05 DIAGNOSIS — I25.10 ATHEROSCLEROTIC HEART DISEASE OF NATIVE CORONARY ARTERY WITHOUT ANGINA PECTORIS: ICD-10-CM

## 2024-11-05 DIAGNOSIS — Z95.5 PRESENCE OF CORONARY ANGIOPLASTY IMPLANT AND GRAFT: Chronic | ICD-10-CM

## 2024-11-05 DIAGNOSIS — Z98.89 OTHER SPECIFIED POSTPROCEDURAL STATES: Chronic | ICD-10-CM

## 2024-11-05 PROCEDURE — G0463: CPT

## 2024-11-06 ENCOUNTER — NON-APPOINTMENT (OUTPATIENT)
Age: 69
End: 2024-11-06

## 2024-11-06 DIAGNOSIS — I42.9 CARDIOMYOPATHY, UNSPECIFIED: ICD-10-CM

## 2024-12-05 ENCOUNTER — APPOINTMENT (OUTPATIENT)
Dept: ELECTROPHYSIOLOGY | Facility: CLINIC | Age: 69
End: 2024-12-05

## 2024-12-05 PROCEDURE — 93295 DEV INTERROG REMOTE 1/2/MLT: CPT

## 2024-12-05 PROCEDURE — 93296 REM INTERROG EVL PM/IDS: CPT

## 2024-12-17 ENCOUNTER — NON-APPOINTMENT (OUTPATIENT)
Age: 69
End: 2024-12-17

## 2024-12-17 ENCOUNTER — APPOINTMENT (OUTPATIENT)
Dept: ELECTROPHYSIOLOGY | Facility: CLINIC | Age: 69
End: 2024-12-17

## 2024-12-17 VITALS — OXYGEN SATURATION: 97 % | DIASTOLIC BLOOD PRESSURE: 71 MMHG | HEART RATE: 70 BPM | SYSTOLIC BLOOD PRESSURE: 110 MMHG

## 2024-12-17 PROCEDURE — 93282 PRGRMG EVAL IMPLANTABLE DFB: CPT

## 2024-12-17 PROCEDURE — 93000 ELECTROCARDIOGRAM COMPLETE: CPT | Mod: XU

## 2024-12-26 PROCEDURE — 93244 EXT ECG>48HR<7D REV&INTERPJ: CPT

## 2025-01-24 NOTE — ED PROVIDER NOTE - CARDIAC, MLM
No Normal rate, regular rhythm.  Heart sounds S1, S2.  No murmurs, rubs or gallops. good distal pulses and cap refill all 4 extremities

## 2025-01-29 ENCOUNTER — APPOINTMENT (OUTPATIENT)
Dept: ELECTROPHYSIOLOGY | Facility: CLINIC | Age: 70
End: 2025-01-29
Payer: MEDICARE

## 2025-01-29 VITALS
HEIGHT: 65 IN | WEIGHT: 168 LBS | OXYGEN SATURATION: 97 % | BODY MASS INDEX: 27.99 KG/M2 | DIASTOLIC BLOOD PRESSURE: 67 MMHG | HEART RATE: 60 BPM | SYSTOLIC BLOOD PRESSURE: 110 MMHG

## 2025-01-29 DIAGNOSIS — I42.9 CARDIOMYOPATHY, UNSPECIFIED: ICD-10-CM

## 2025-01-29 PROCEDURE — 93000 ELECTROCARDIOGRAM COMPLETE: CPT | Mod: 59

## 2025-01-29 PROCEDURE — 93282 PRGRMG EVAL IMPLANTABLE DFB: CPT

## 2025-01-29 PROCEDURE — 99214 OFFICE O/P EST MOD 30 MIN: CPT | Mod: 25

## 2025-03-19 ENCOUNTER — APPOINTMENT (OUTPATIENT)
Dept: ELECTROPHYSIOLOGY | Facility: CLINIC | Age: 70
End: 2025-03-19

## 2025-03-19 PROCEDURE — 93296 REM INTERROG EVL PM/IDS: CPT

## 2025-03-19 PROCEDURE — 93295 DEV INTERROG REMOTE 1/2/MLT: CPT

## 2025-04-29 ENCOUNTER — NON-APPOINTMENT (OUTPATIENT)
Age: 70
End: 2025-04-29

## 2025-04-29 ENCOUNTER — APPOINTMENT (OUTPATIENT)
Dept: CARDIOLOGY | Facility: HOSPITAL | Age: 70
End: 2025-04-29

## 2025-04-29 ENCOUNTER — OUTPATIENT (OUTPATIENT)
Dept: OUTPATIENT SERVICES | Facility: HOSPITAL | Age: 70
LOS: 1 days | End: 2025-04-29
Payer: MEDICARE

## 2025-04-29 VITALS
DIASTOLIC BLOOD PRESSURE: 66 MMHG | SYSTOLIC BLOOD PRESSURE: 111 MMHG | HEART RATE: 71 BPM | BODY MASS INDEX: 27.32 KG/M2 | WEIGHT: 164 LBS | HEIGHT: 65 IN | OXYGEN SATURATION: 96 %

## 2025-04-29 DIAGNOSIS — I10 ESSENTIAL (PRIMARY) HYPERTENSION: ICD-10-CM

## 2025-04-29 DIAGNOSIS — I25.10 ATHEROSCLEROTIC HEART DISEASE OF NATIVE CORONARY ARTERY WITHOUT ANGINA PECTORIS: ICD-10-CM

## 2025-04-29 DIAGNOSIS — I42.9 CARDIOMYOPATHY, UNSPECIFIED: ICD-10-CM

## 2025-04-29 DIAGNOSIS — Z95.5 PRESENCE OF CORONARY ANGIOPLASTY IMPLANT AND GRAFT: Chronic | ICD-10-CM

## 2025-04-29 DIAGNOSIS — I25.10 ATHEROSCLEROTIC HEART DISEASE OF NATIVE CORONARY ARTERY W/OUT ANGINA PECTORIS: ICD-10-CM

## 2025-04-29 DIAGNOSIS — Z98.89 OTHER SPECIFIED POSTPROCEDURAL STATES: Chronic | ICD-10-CM

## 2025-04-29 DIAGNOSIS — Z95.810 PRESENCE OF AUTOMATIC (IMPLANTABLE) CARDIAC DEFIBRILLATOR: Chronic | ICD-10-CM

## 2025-04-29 PROCEDURE — 93005 ELECTROCARDIOGRAM TRACING: CPT

## 2025-04-29 PROCEDURE — G0463: CPT

## 2025-04-30 DIAGNOSIS — I42.9 CARDIOMYOPATHY, UNSPECIFIED: ICD-10-CM

## 2025-04-30 DIAGNOSIS — I10 ESSENTIAL (PRIMARY) HYPERTENSION: ICD-10-CM

## 2025-05-19 ENCOUNTER — EMERGENCY (EMERGENCY)
Facility: HOSPITAL | Age: 70
LOS: 1 days | End: 2025-05-19
Attending: EMERGENCY MEDICINE | Admitting: EMERGENCY MEDICINE
Payer: MEDICARE

## 2025-05-19 VITALS
SYSTOLIC BLOOD PRESSURE: 115 MMHG | TEMPERATURE: 97 F | DIASTOLIC BLOOD PRESSURE: 73 MMHG | HEIGHT: 66 IN | RESPIRATION RATE: 19 BRPM | OXYGEN SATURATION: 98 % | HEART RATE: 62 BPM | WEIGHT: 164.02 LBS

## 2025-05-19 DIAGNOSIS — Z98.89 OTHER SPECIFIED POSTPROCEDURAL STATES: Chronic | ICD-10-CM

## 2025-05-19 DIAGNOSIS — Z95.5 PRESENCE OF CORONARY ANGIOPLASTY IMPLANT AND GRAFT: Chronic | ICD-10-CM

## 2025-05-19 DIAGNOSIS — Z95.810 PRESENCE OF AUTOMATIC (IMPLANTABLE) CARDIAC DEFIBRILLATOR: Chronic | ICD-10-CM

## 2025-05-19 LAB
ALBUMIN SERPL ELPH-MCNC: 3.3 G/DL — SIGNIFICANT CHANGE UP (ref 3.3–5)
ALP SERPL-CCNC: 62 U/L — SIGNIFICANT CHANGE UP (ref 40–120)
ALT FLD-CCNC: 41 U/L — SIGNIFICANT CHANGE UP (ref 12–78)
ANION GAP SERPL CALC-SCNC: 7 MMOL/L — SIGNIFICANT CHANGE UP (ref 5–17)
AST SERPL-CCNC: 27 U/L — SIGNIFICANT CHANGE UP (ref 15–37)
BASOPHILS # BLD AUTO: 0.02 K/UL — SIGNIFICANT CHANGE UP (ref 0–0.2)
BASOPHILS NFR BLD AUTO: 0.4 % — SIGNIFICANT CHANGE UP (ref 0–2)
BILIRUB SERPL-MCNC: 0.8 MG/DL — SIGNIFICANT CHANGE UP (ref 0.2–1.2)
BUN SERPL-MCNC: 25 MG/DL — HIGH (ref 7–23)
CALCIUM SERPL-MCNC: 8.8 MG/DL — SIGNIFICANT CHANGE UP (ref 8.5–10.1)
CHLORIDE SERPL-SCNC: 111 MMOL/L — HIGH (ref 96–108)
CO2 SERPL-SCNC: 24 MMOL/L — SIGNIFICANT CHANGE UP (ref 22–31)
CREAT SERPL-MCNC: 1.1 MG/DL — SIGNIFICANT CHANGE UP (ref 0.5–1.3)
EGFR: 73 ML/MIN/1.73M2 — SIGNIFICANT CHANGE UP
EGFR: 73 ML/MIN/1.73M2 — SIGNIFICANT CHANGE UP
EOSINOPHIL # BLD AUTO: 0.12 K/UL — SIGNIFICANT CHANGE UP (ref 0–0.5)
EOSINOPHIL NFR BLD AUTO: 2.2 % — SIGNIFICANT CHANGE UP (ref 0–6)
GLUCOSE SERPL-MCNC: 91 MG/DL — SIGNIFICANT CHANGE UP (ref 70–99)
HCT VFR BLD CALC: 47.1 % — SIGNIFICANT CHANGE UP (ref 39–50)
HGB BLD-MCNC: 15.9 G/DL — SIGNIFICANT CHANGE UP (ref 13–17)
IMM GRANULOCYTES NFR BLD AUTO: 0.2 % — SIGNIFICANT CHANGE UP (ref 0–0.9)
LYMPHOCYTES # BLD AUTO: 1 K/UL — SIGNIFICANT CHANGE UP (ref 1–3.3)
LYMPHOCYTES # BLD AUTO: 18.2 % — SIGNIFICANT CHANGE UP (ref 13–44)
MCHC RBC-ENTMCNC: 30.6 PG — SIGNIFICANT CHANGE UP (ref 27–34)
MCHC RBC-ENTMCNC: 33.8 G/DL — SIGNIFICANT CHANGE UP (ref 32–36)
MCV RBC AUTO: 90.6 FL — SIGNIFICANT CHANGE UP (ref 80–100)
MONOCYTES # BLD AUTO: 0.73 K/UL — SIGNIFICANT CHANGE UP (ref 0–0.9)
MONOCYTES NFR BLD AUTO: 13.3 % — SIGNIFICANT CHANGE UP (ref 2–14)
NEUTROPHILS # BLD AUTO: 3.6 K/UL — SIGNIFICANT CHANGE UP (ref 1.8–7.4)
NEUTROPHILS NFR BLD AUTO: 65.7 % — SIGNIFICANT CHANGE UP (ref 43–77)
NRBC BLD AUTO-RTO: 0 /100 WBCS — SIGNIFICANT CHANGE UP (ref 0–0)
PLATELET # BLD AUTO: 129 K/UL — LOW (ref 150–400)
POTASSIUM SERPL-MCNC: 4.6 MMOL/L — SIGNIFICANT CHANGE UP (ref 3.5–5.3)
POTASSIUM SERPL-SCNC: 4.6 MMOL/L — SIGNIFICANT CHANGE UP (ref 3.5–5.3)
PROT SERPL-MCNC: 6.8 G/DL — SIGNIFICANT CHANGE UP (ref 6–8.3)
RBC # BLD: 5.2 M/UL — SIGNIFICANT CHANGE UP (ref 4.2–5.8)
RBC # FLD: 14.2 % — SIGNIFICANT CHANGE UP (ref 10.3–14.5)
SODIUM SERPL-SCNC: 142 MMOL/L — SIGNIFICANT CHANGE UP (ref 135–145)
WBC # BLD: 5.48 K/UL — SIGNIFICANT CHANGE UP (ref 3.8–10.5)
WBC # FLD AUTO: 5.48 K/UL — SIGNIFICANT CHANGE UP (ref 3.8–10.5)

## 2025-05-19 PROCEDURE — 80053 COMPREHEN METABOLIC PANEL: CPT

## 2025-05-19 PROCEDURE — 72128 CT CHEST SPINE W/O DYE: CPT

## 2025-05-19 PROCEDURE — 99284 EMERGENCY DEPT VISIT MOD MDM: CPT | Mod: 25

## 2025-05-19 PROCEDURE — 85025 COMPLETE CBC W/AUTO DIFF WBC: CPT

## 2025-05-19 PROCEDURE — 99285 EMERGENCY DEPT VISIT HI MDM: CPT

## 2025-05-19 PROCEDURE — 36415 COLL VENOUS BLD VENIPUNCTURE: CPT

## 2025-05-19 PROCEDURE — 72125 CT NECK SPINE W/O DYE: CPT

## 2025-05-19 PROCEDURE — 74177 CT ABD & PELVIS W/CONTRAST: CPT | Mod: 26

## 2025-05-19 PROCEDURE — 72125 CT NECK SPINE W/O DYE: CPT | Mod: 26

## 2025-05-19 PROCEDURE — 74177 CT ABD & PELVIS W/CONTRAST: CPT

## 2025-05-19 PROCEDURE — 72128 CT CHEST SPINE W/O DYE: CPT | Mod: 26

## 2025-05-19 PROCEDURE — 96374 THER/PROPH/DIAG INJ IV PUSH: CPT | Mod: XU

## 2025-05-19 RX ORDER — KETOROLAC TROMETHAMINE 30 MG/ML
30 INJECTION, SOLUTION INTRAMUSCULAR; INTRAVENOUS ONCE
Refills: 0 | Status: DISCONTINUED | OUTPATIENT
Start: 2025-05-19 | End: 2025-05-19

## 2025-05-19 RX ADMIN — KETOROLAC TROMETHAMINE 30 MILLIGRAM(S): 30 INJECTION, SOLUTION INTRAMUSCULAR; INTRAVENOUS at 13:01

## 2025-05-19 RX ADMIN — Medication 20 MILLIGRAM(S): at 16:29
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
## 2025-05-19 NOTE — ED PROVIDER NOTE - CARE PROVIDERS DIRECT ADDRESSES
[email or web address]

## 2025-05-19 NOTE — ED PROVIDER NOTE - PROVIDER TOKENS
PROVIDER:[TOKEN:[[id number]],FOLLOWUP:[1-3 Days]],PROVIDER:[TOKEN:[[id number]],FOLLOWUP:[1-3 Days]],PROVIDER:[TOKEN:[[id number]],FOLLOWUP:[1-3 Days]]

## 2025-05-19 NOTE — ED ADULT NURSE NOTE - NS ED NOTE ABUSE SUSPICION NEGLECT YN
"John Younger is a 72 year old male who is being evaluated via a billable telephone visit.      The patient has been notified of following:     \"This telephone visit will be conducted via a call between you and your physician/provider. We have found that certain health care needs can be provided without the need for a physical exam.  This service lets us provide the care you need with a short phone conversation.  If a prescription is necessary we can send it directly to your pharmacy.  If lab work is needed we can place an order for that and you can then stop by our lab to have the test done at a later time.    If during the course of the call the physician/provider feels a telephone visit is not appropriate, you will not be charged for this service.\"     John Younger complains of    Chief Complaint   Patient presents with     Telephone     lipid       I have reviewed and updated the patient's Past Medical History, Social History, Family History and Medication List.    ALLERGIES  Patient has no known allergies.    Hyperlipidemia Follow-Up  Atorvastatin 40mg qd    Are you regularly taking any medication or supplement to lower your cholesterol?   Yes- statin    Are you having muscle aches or other side effects that you think could be caused by your cholesterol lowering medication?  No    Depression and Anxiety Follow-Up  Seroquel 100mg qhs    How are you doing with your depression since your last visit? No change    How are you doing with your anxiety since your last visit?  No change    Are you having other symptoms that might be associated with depression or anxiety? No    Have you had a significant life event? No     Do you have any concerns with your use of alcohol or other drugs? No    Social History     Tobacco Use     Smoking status: Former Smoker     Last attempt to quit: 7/15/1983     Years since quittin.7     Smokeless tobacco: Never Used     Tobacco comment: started smoking at age 15 got up to 3 " packs a day before quiting   Substance Use Topics     Alcohol use: Yes     Comment: maybe 6 beers a month at the most     Drug use: No     PHQ 4/8/2016 11/23/2016 8/3/2018   PHQ-9 Total Score 2 0 3   Q9: Thoughts of better off dead/self-harm past 2 weeks Not at all Not at all Not at all     LEOLA-7 SCORE 11/23/2016 8/3/2018 3/20/2020   Total Score - - -   Total Score 0 3 0       Suicide Assessment Five-step Evaluation and Treatment (SAFE-T)      Assessment/Plan:  1. Generalized anxiety disorder  Overall, doing well.  Tolerating his medication well without dizziness, reports sleeping well through the night.  - QUEtiapine (SEROQUEL) 100 MG tablet; TAKE 1 TABLET BY MOUTH NIGHTLY  Dispense: 90 tablet; Refill: 3  Refill given for 6 months.    2. Hyperlipidemia LDL goal <130  No known secondary complications.  He states he is somewhat inconsistent with taking a statin.  Advised that we will get the most benefit from consistently taking statin therapy.  Follow-up in 6 months.      Phone call duration:  6 minutes    Olesya Krishnan MD       No
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
## 2025-05-19 NOTE — ED PROVIDER NOTE - NSFOLLOWUPINSTRUCTIONS_ED_ALL_ED_FT
Back Pain    Back pain is very common in adults. The cause of back pain is rarely dangerous and the pain often gets better over time. The cause of your back pain may not be known and may include strain of muscles or ligaments, degeneration of the spinal disks, or arthritis. Occasionally the pain may radiate down your leg(s). Over-the-counter medicines to reduce pain and inflammation are often the most helpful. Stretching and remaining active frequently helps the healing process.     SEEK IMMEDIATE MEDICAL CARE IF YOU HAVE ANY OF THE FOLLOWING SYMPTOMS: bowel or bladder control problems, unusual weakness or numbness in your arms or legs, nausea or vomiting, abdominal pain, fever, dizziness/lightheadedness.    Hydrocele, Adult    A hydrocele is a collection of fluid in the loose pouch of skin that holds the testicles (scrotum). Usually, it affects only one testicle.     CAUSES  This condition may be caused by:    An injury to the scrotum.  An infection.  A tumor or cancer of the testicle.  Twisting of a testicle.  Decreased blood flow to the scrotum.    SYMPTOMS  A hydrocele feels like a water-filled balloon. It may also feel heavy. A hydrocele can cause:    Swelling of the scrotum. The swelling may decrease when you lie down.  Swelling of the groin.  Mild discomfort in the scrotum.  Pain. This can develop if the hydrocele was caused by infection or twisting.    DIAGNOSIS  This condition may be diagnosed with a medical history, physical exam, and imaging tests. You may also have blood and urine tests to check for infection.    TREATMENT  Treatment may include:    Watching and waiting, particularly if the hydrocele causes no symptoms.  Treatment of the underlying condition. This may include using antibiotic medicine.  Surgery to drain the fluid. Some surgical options include:  Needle aspiration. For this procedure, a needle is used to drain fluid.  Hydrocelectomy. For this procedure, an incision is made in the scrotum to remove the fluid sac.    HOME CARE INSTRUCTIONS  Keep all follow-up visits as told by your health care provider. This is important.  Watch the hydrocele for any changes.  Take over-the-counter and prescription medicines only as told by your health care provider.  If you were prescribed an antibiotic medicine, use it as told by your health care provider. Do not stop using the antibiotic even if your condition improves.    SEEK MEDICAL CARE IF:  The swelling in your scrotum or groin gets worse.  The hydrocele becomes red, firm, tender to the touch, or painful.  You notice any changes in the hydrocele.  You have a fever.    ADDITIONAL NOTES AND INSTRUCTIONS    Please follow up with your Primary MD in 24-48 hr.  Seek immediate medical care for any new/worsening signs or symptoms.

## 2025-05-19 NOTE — ED PROVIDER NOTE - CARE PLAN
Principal Discharge DX:	Back pain  Secondary Diagnosis:	Inguinal hernia  Secondary Diagnosis:	Hydrocele   1
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
## 2025-05-19 NOTE — ED PROVIDER NOTE - PATIENT PORTAL LINK FT
You can access the FollowMyHealth Patient Portal offered by [facility] by registering at the following website: [email or web address]. By joining [facility]’s FollowMyHealth portal, you will also be able to view your health information using other applications (apps) compatible with our system.

## 2025-05-19 NOTE — ED ADULT NURSE NOTE - HIV OFFER
----- Message from Elinor Chambers sent at 10/15/2019  2:38 PM CDT -----  Contact: Rishi Holt [phone]  Type:  Patient Returning Call    Who Called: Rishi Holt     Who Left Message for Patient: Rosmery Madera LPN      Does the patient know what this is regarding?: Pain medication    Would the patient rather a call back or a response via [facility]? Call back     Best Call Back Number: [phone]        
See previous encounter.  
Opt out

## 2025-05-19 NOTE — ED PROVIDER NOTE - MUSCULOSKELETAL, MLM
+ mild TTP mid cervical spine with FROM. + bilateral parathoracic tenderness. no rib tenderness. no pelvic tenderness. FROM of all extremities.

## 2025-05-19 NOTE — ED PROVIDER NOTE - CARE PROVIDER_API CALL
Anthony Magaña  Surgery  [address] [place] [address]  Phone: [phone]  Fax: [phone]  Follow Up Time: 1-3 Days    Kwabena Ocampo  Urology  [address] [place] [address]  Phone: [phone]  Fax: [phone]  Follow Up Time: 1-3 Days    Wander Brennan  Orthopaedic Surgery  [address] [facility], [address] [address]  Phone: [phone]  Fax: [phone]  Follow Up Time: 1-3 Days

## 2025-05-19 NOTE — ED PROVIDER NOTE - CPE EDP SKIN NORM
Coronavirus (COVID-19) Notification    Caller Name (Patient, parent, daughter/son, grandparent, etc)  PATIENT     Reason for call  Notify of Positive Coronavirus (COVID-19) lab results, assess symptoms,  review [facility] recommendations    Lab Result    Lab test:  2019-nCoV rRt-PCR or SARS-CoV-2 PCR    Oropharyngeal AND/OR nasopharyngeal swabs is POSITIVE for 2019-nCoV RNA/SARS-COV-2 PCR (COVID-19 virus)      Gather patient reported symptoms   Assessment   Current Symptoms at time of phone call, reported by patient: (if no symptoms, document: No symptoms]  STUFFY, IRRITATED AND DRY THROAT    Date of symptom(s) onset (if applicable) 09/13/2024     If at time of call, Patients symptoms have worsened, the Patient should contact 911 or have someone drive them to Emergency Dept promptly:    If Patient calling 911, inform 911 personal that you have tested positive for the Coronavirus (COVID-19).  Place mask on and await 911 to arrive.  If Emergency Dept, If possible, please have another adult drive you to the Emergency Dept but you need to wear mask when in contact with other people.      Treatment Options:   Is patient interested in discussing COVID treatment? No.        Review information with Patient    Your result was positive. This means you have COVID-19 (coronavirus).    How can I protect others?    These guidelines are for isolating before returning to work, school or .    If you DO have symptoms  Stay home and away from others   For at least 5 days after your symptoms started, AND  You are fever free for 24 hours (with no medicine that reduces fever), AND  Your other symptoms are better  Wear a mask for 10 full days anytime you are around others    If you DON'T have symptoms  Stay home and away from others for at least 5 days after your positive test  Wear a mask for 10 full days anytime you are around others    There may be different guidelines for healthcare facilities.  Please check with the  specific sites before arriving.    If you have been told by a doctor that you were severely ill with COVID-19 or are immunocompromised, you should isolate for at least 10 days.    You should not go back to work until you meet the guidelines above for ending your home isolation. You don't need to be retested for COVID-19 before going back to work--studies show that you won't spread the virus if it's been at least 10 days since your symptoms started (or 20 days, if you have a weak immune system).    Employers, schools, and daycares: This is an official notice for this person's medical guidelines for returning in-person.  They must meet the above guidelines before going back to work, school or  in person.    You will receive a positive COVID-19 letter via [employer] or the mail soon with additional self-care information.    Would you like me to review some of that information with you now?  NO.    If you were tested for an upcoming procedure, please contact your provider for next steps.    Angy Vega           normal...

## 2025-05-19 NOTE — ED PROVIDER NOTE - ABDOMINAL EXAM
+ bilateral reproducible inguinal hernias with minimal tenderness. no rebound or guarding./soft/nontender...

## 2025-06-18 ENCOUNTER — APPOINTMENT (OUTPATIENT)
Dept: ELECTROPHYSIOLOGY | Facility: CLINIC | Age: 70
End: 2025-06-18

## 2025-06-24 ENCOUNTER — APPOINTMENT (OUTPATIENT)
Dept: ELECTROPHYSIOLOGY | Facility: CLINIC | Age: 70
End: 2025-06-24

## 2025-06-29 NOTE — ED PROVIDER NOTE - NSCAREINITIATED _GEN_ER
bilateral upper extremity Active ROM was WFL (within functional limits)/bilateral  lower extremity Active ROM was WFL (within functional limits)
Parag Corral(Attending)

## 2025-07-01 ENCOUNTER — APPOINTMENT (OUTPATIENT)
Dept: ELECTROPHYSIOLOGY | Facility: CLINIC | Age: 70
End: 2025-07-01

## 2025-07-08 ENCOUNTER — APPOINTMENT (OUTPATIENT)
Dept: ELECTROPHYSIOLOGY | Facility: CLINIC | Age: 70
End: 2025-07-08

## 2025-07-08 PROCEDURE — 93296 REM INTERROG EVL PM/IDS: CPT

## 2025-07-08 PROCEDURE — 93295 DEV INTERROG REMOTE 1/2/MLT: CPT

## 2025-09-19 ENCOUNTER — RX RENEWAL (OUTPATIENT)
Age: 70
End: 2025-09-19